# Patient Record
Sex: FEMALE | Race: WHITE | Employment: OTHER | URBAN - NONMETROPOLITAN AREA
[De-identification: names, ages, dates, MRNs, and addresses within clinical notes are randomized per-mention and may not be internally consistent; named-entity substitution may affect disease eponyms.]

---

## 2017-01-05 RX ORDER — CLONAZEPAM 2 MG/1
TABLET ORAL
Qty: 60 TABLET | OUTPATIENT
Start: 2017-01-05

## 2017-01-05 RX ORDER — DEXTROAMPHETAMINE SACCHARATE, AMPHETAMINE ASPARTATE, DEXTROAMPHETAMINE SULFATE AND AMPHETAMINE SULFATE 5; 5; 5; 5 MG/1; MG/1; MG/1; MG/1
TABLET ORAL
Qty: 60 TABLET | Refills: 0 | Status: SHIPPED | OUTPATIENT
Start: 2017-01-05 | End: 2017-02-06 | Stop reason: SDUPTHER

## 2017-01-05 RX ORDER — LORAZEPAM 1 MG/1
TABLET ORAL
Qty: 60 TABLET | OUTPATIENT
Start: 2017-01-05

## 2017-01-06 ENCOUNTER — TELEPHONE (OUTPATIENT)
Dept: PSYCHIATRY | Age: 62
End: 2017-01-06

## 2017-01-06 RX ORDER — DEXTROAMPHETAMINE SACCHARATE, AMPHETAMINE ASPARTATE, DEXTROAMPHETAMINE SULFATE AND AMPHETAMINE SULFATE 5; 5; 5; 5 MG/1; MG/1; MG/1; MG/1
TABLET ORAL
Qty: 60 TABLET | Refills: 0 | Status: CANCELLED | OUTPATIENT
Start: 2017-01-06

## 2017-01-09 RX ORDER — CLONAZEPAM 2 MG/1
TABLET ORAL
Qty: 60 TABLET | Refills: 2 | Status: SHIPPED | OUTPATIENT
Start: 2017-01-09 | End: 2017-05-05 | Stop reason: SDUPTHER

## 2017-01-09 RX ORDER — LORAZEPAM 1 MG/1
1 TABLET ORAL 2 TIMES DAILY PRN
Qty: 60 TABLET | Refills: 0 | Status: SHIPPED | OUTPATIENT
Start: 2017-01-09 | End: 2017-02-06 | Stop reason: SDUPTHER

## 2017-01-16 ENCOUNTER — OFFICE VISIT (OUTPATIENT)
Dept: FAMILY MEDICINE CLINIC | Age: 62
End: 2017-01-16

## 2017-01-16 ENCOUNTER — TELEPHONE (OUTPATIENT)
Dept: FAMILY MEDICINE CLINIC | Age: 62
End: 2017-01-16

## 2017-01-16 VITALS
BODY MASS INDEX: 30.7 KG/M2 | DIASTOLIC BLOOD PRESSURE: 70 MMHG | WEIGHT: 191 LBS | HEART RATE: 73 BPM | TEMPERATURE: 97.8 F | HEIGHT: 66 IN | SYSTOLIC BLOOD PRESSURE: 118 MMHG | RESPIRATION RATE: 20 BRPM

## 2017-01-16 DIAGNOSIS — M54.41 CHRONIC BILATERAL LOW BACK PAIN WITH BILATERAL SCIATICA: Primary | ICD-10-CM

## 2017-01-16 DIAGNOSIS — E78.2 MIXED HYPERLIPIDEMIA: ICD-10-CM

## 2017-01-16 DIAGNOSIS — G89.29 CHRONIC BILATERAL LOW BACK PAIN WITH BILATERAL SCIATICA: Primary | ICD-10-CM

## 2017-01-16 DIAGNOSIS — M54.42 CHRONIC BILATERAL LOW BACK PAIN WITH BILATERAL SCIATICA: Primary | ICD-10-CM

## 2017-01-16 DIAGNOSIS — J42 CHRONIC BRONCHITIS, UNSPECIFIED CHRONIC BRONCHITIS TYPE (HCC): ICD-10-CM

## 2017-01-16 PROCEDURE — G8484 FLU IMMUNIZE NO ADMIN: HCPCS | Performed by: NURSE PRACTITIONER

## 2017-01-16 PROCEDURE — G8427 DOCREV CUR MEDS BY ELIG CLIN: HCPCS | Performed by: NURSE PRACTITIONER

## 2017-01-16 PROCEDURE — 3017F COLORECTAL CA SCREEN DOC REV: CPT | Performed by: NURSE PRACTITIONER

## 2017-01-16 PROCEDURE — 4004F PT TOBACCO SCREEN RCVD TLK: CPT | Performed by: NURSE PRACTITIONER

## 2017-01-16 PROCEDURE — G8419 CALC BMI OUT NRM PARAM NOF/U: HCPCS | Performed by: NURSE PRACTITIONER

## 2017-01-16 PROCEDURE — 3014F SCREEN MAMMO DOC REV: CPT | Performed by: NURSE PRACTITIONER

## 2017-01-16 PROCEDURE — G8926 SPIRO NO PERF OR DOC: HCPCS | Performed by: NURSE PRACTITIONER

## 2017-01-16 PROCEDURE — 99214 OFFICE O/P EST MOD 30 MIN: CPT | Performed by: NURSE PRACTITIONER

## 2017-01-16 PROCEDURE — 3023F SPIROM DOC REV: CPT | Performed by: NURSE PRACTITIONER

## 2017-01-16 RX ORDER — DIMETHYL FUMARATE 240 MG/1
240 CAPSULE ORAL 2 TIMES DAILY
Status: ON HOLD | COMMUNITY
Start: 2016-12-30 | End: 2017-09-01 | Stop reason: HOSPADM

## 2017-01-24 ENCOUNTER — OFFICE VISIT (OUTPATIENT)
Dept: PSYCHIATRY | Age: 62
End: 2017-01-24

## 2017-01-24 DIAGNOSIS — F25.1 SCHIZOAFFECTIVE DISORDER, DEPRESSIVE TYPE (HCC): Primary | ICD-10-CM

## 2017-01-24 PROCEDURE — 4004F PT TOBACCO SCREEN RCVD TLK: CPT | Performed by: PSYCHIATRY & NEUROLOGY

## 2017-01-24 PROCEDURE — 99213 OFFICE O/P EST LOW 20 MIN: CPT | Performed by: PSYCHIATRY & NEUROLOGY

## 2017-01-24 PROCEDURE — G8428 CUR MEDS NOT DOCUMENT: HCPCS | Performed by: PSYCHIATRY & NEUROLOGY

## 2017-01-24 PROCEDURE — G8484 FLU IMMUNIZE NO ADMIN: HCPCS | Performed by: PSYCHIATRY & NEUROLOGY

## 2017-01-24 PROCEDURE — 3014F SCREEN MAMMO DOC REV: CPT | Performed by: PSYCHIATRY & NEUROLOGY

## 2017-01-24 PROCEDURE — G8419 CALC BMI OUT NRM PARAM NOF/U: HCPCS | Performed by: PSYCHIATRY & NEUROLOGY

## 2017-01-24 PROCEDURE — 3017F COLORECTAL CA SCREEN DOC REV: CPT | Performed by: PSYCHIATRY & NEUROLOGY

## 2017-01-31 ENCOUNTER — TELEPHONE (OUTPATIENT)
Dept: FAMILY MEDICINE CLINIC | Age: 62
End: 2017-01-31

## 2017-02-02 ENCOUNTER — TELEPHONE (OUTPATIENT)
Dept: FAMILY MEDICINE CLINIC | Age: 62
End: 2017-02-02

## 2017-02-02 DIAGNOSIS — M54.41 CHRONIC BILATERAL LOW BACK PAIN WITH BILATERAL SCIATICA: Primary | ICD-10-CM

## 2017-02-02 DIAGNOSIS — G89.29 CHRONIC BILATERAL LOW BACK PAIN WITH BILATERAL SCIATICA: Primary | ICD-10-CM

## 2017-02-02 DIAGNOSIS — M54.42 CHRONIC BILATERAL LOW BACK PAIN WITH BILATERAL SCIATICA: Primary | ICD-10-CM

## 2017-02-06 ENCOUNTER — TELEPHONE (OUTPATIENT)
Dept: FAMILY MEDICINE CLINIC | Age: 62
End: 2017-02-06

## 2017-02-06 RX ORDER — LEVOTHYROXINE SODIUM 0.05 MG/1
TABLET ORAL
Qty: 90 TABLET | Refills: 3 | Status: SHIPPED | OUTPATIENT
Start: 2017-02-06 | End: 2017-08-30

## 2017-02-06 RX ORDER — LEVOTHYROXINE SODIUM 0.05 MG/1
TABLET ORAL
Qty: 90 TABLET | Refills: 1 | Status: CANCELLED | OUTPATIENT
Start: 2017-02-06

## 2017-02-06 RX ORDER — OMEPRAZOLE 20 MG/1
CAPSULE, DELAYED RELEASE ORAL
Qty: 90 CAPSULE | Refills: 3 | Status: SHIPPED | OUTPATIENT
Start: 2017-02-06 | End: 2017-08-30

## 2017-02-06 RX ORDER — GALANTAMINE HYDROBROMIDE 8 MG/1
8 TABLET, FILM COATED ORAL 2 TIMES DAILY
Qty: 180 TABLET | Refills: 3 | Status: ON HOLD | OUTPATIENT
Start: 2017-02-06 | End: 2018-09-26

## 2017-02-06 RX ORDER — RANITIDINE 150 MG/1
150 TABLET ORAL NIGHTLY
Qty: 90 TABLET | Refills: 3 | Status: ON HOLD | OUTPATIENT
Start: 2017-02-06 | End: 2018-08-21 | Stop reason: HOSPADM

## 2017-02-06 RX ORDER — ASPIRIN 81 MG/1
TABLET ORAL
Qty: 90 TABLET | Refills: 3 | Status: ON HOLD | OUTPATIENT
Start: 2017-02-06 | End: 2018-09-26

## 2017-02-06 RX ORDER — ASPIRIN 81 MG/1
TABLET ORAL
Qty: 30 TABLET | Refills: 5 | Status: CANCELLED | OUTPATIENT
Start: 2017-02-06

## 2017-02-06 RX ORDER — ALENDRONATE SODIUM 70 MG/1
TABLET ORAL
Qty: 12 TABLET | Refills: 3 | Status: ON HOLD | OUTPATIENT
Start: 2017-02-06 | End: 2017-08-31

## 2017-02-07 RX ORDER — LORAZEPAM 1 MG/1
1 TABLET ORAL 2 TIMES DAILY PRN
Qty: 60 TABLET | Refills: 0 | Status: SHIPPED | OUTPATIENT
Start: 2017-02-07 | End: 2017-04-06 | Stop reason: SDUPTHER

## 2017-02-07 RX ORDER — DEXTROAMPHETAMINE SACCHARATE, AMPHETAMINE ASPARTATE, DEXTROAMPHETAMINE SULFATE AND AMPHETAMINE SULFATE 5; 5; 5; 5 MG/1; MG/1; MG/1; MG/1
TABLET ORAL
Qty: 60 TABLET | Refills: 0 | Status: SHIPPED | OUTPATIENT
Start: 2017-02-07 | End: 2017-03-06 | Stop reason: SDUPTHER

## 2017-02-10 ENCOUNTER — OFFICE VISIT (OUTPATIENT)
Dept: FAMILY MEDICINE CLINIC | Age: 62
End: 2017-02-10

## 2017-02-10 VITALS
HEIGHT: 66 IN | BODY MASS INDEX: 30.63 KG/M2 | SYSTOLIC BLOOD PRESSURE: 110 MMHG | WEIGHT: 190.6 LBS | DIASTOLIC BLOOD PRESSURE: 70 MMHG | HEART RATE: 92 BPM | RESPIRATION RATE: 14 BRPM | TEMPERATURE: 97.9 F

## 2017-02-10 DIAGNOSIS — M54.50 CHRONIC BILATERAL LOW BACK PAIN WITHOUT SCIATICA: ICD-10-CM

## 2017-02-10 DIAGNOSIS — G89.29 CHRONIC BILATERAL LOW BACK PAIN WITHOUT SCIATICA: ICD-10-CM

## 2017-02-10 DIAGNOSIS — G20 PARKINSON DISEASE (HCC): ICD-10-CM

## 2017-02-10 DIAGNOSIS — G35 MULTIPLE SCLEROSIS (HCC): ICD-10-CM

## 2017-02-10 DIAGNOSIS — M51.36 DDD (DEGENERATIVE DISC DISEASE), LUMBAR: Primary | ICD-10-CM

## 2017-02-10 PROCEDURE — 99213 OFFICE O/P EST LOW 20 MIN: CPT | Performed by: NURSE PRACTITIONER

## 2017-02-10 PROCEDURE — 3017F COLORECTAL CA SCREEN DOC REV: CPT | Performed by: NURSE PRACTITIONER

## 2017-02-10 PROCEDURE — 3014F SCREEN MAMMO DOC REV: CPT | Performed by: NURSE PRACTITIONER

## 2017-02-10 PROCEDURE — 4004F PT TOBACCO SCREEN RCVD TLK: CPT | Performed by: NURSE PRACTITIONER

## 2017-02-10 PROCEDURE — G8427 DOCREV CUR MEDS BY ELIG CLIN: HCPCS | Performed by: NURSE PRACTITIONER

## 2017-02-10 PROCEDURE — G8484 FLU IMMUNIZE NO ADMIN: HCPCS | Performed by: NURSE PRACTITIONER

## 2017-02-10 PROCEDURE — G8417 CALC BMI ABV UP PARAM F/U: HCPCS | Performed by: NURSE PRACTITIONER

## 2017-02-10 RX ORDER — TIZANIDINE 4 MG/1
4 TABLET ORAL 3 TIMES DAILY
Qty: 180 TABLET | Refills: 3 | Status: ON HOLD | OUTPATIENT
Start: 2017-02-10 | End: 2018-09-26

## 2017-02-23 ENCOUNTER — TELEPHONE (OUTPATIENT)
Dept: FAMILY MEDICINE CLINIC | Age: 62
End: 2017-02-23

## 2017-02-24 ENCOUNTER — OFFICE VISIT (OUTPATIENT)
Dept: PHYSICAL MEDICINE AND REHAB | Age: 62
End: 2017-02-24

## 2017-02-24 VITALS
DIASTOLIC BLOOD PRESSURE: 68 MMHG | WEIGHT: 185 LBS | BODY MASS INDEX: 29.73 KG/M2 | SYSTOLIC BLOOD PRESSURE: 114 MMHG | HEIGHT: 66 IN | HEART RATE: 72 BPM

## 2017-02-24 DIAGNOSIS — M47.816 SPONDYLOSIS OF LUMBAR REGION WITHOUT MYELOPATHY OR RADICULOPATHY: Primary | ICD-10-CM

## 2017-02-24 DIAGNOSIS — F25.1 SCHIZOAFFECTIVE DISORDER, DEPRESSIVE TYPE (HCC): ICD-10-CM

## 2017-02-24 DIAGNOSIS — G35 MS (MULTIPLE SCLEROSIS) (HCC): ICD-10-CM

## 2017-02-24 DIAGNOSIS — M51.36 DDD (DEGENERATIVE DISC DISEASE), LUMBAR: ICD-10-CM

## 2017-02-24 DIAGNOSIS — G89.4 CHRONIC PAIN SYNDROME: ICD-10-CM

## 2017-02-24 PROCEDURE — G8484 FLU IMMUNIZE NO ADMIN: HCPCS | Performed by: PAIN MEDICINE

## 2017-02-24 PROCEDURE — G8427 DOCREV CUR MEDS BY ELIG CLIN: HCPCS | Performed by: PAIN MEDICINE

## 2017-02-24 PROCEDURE — 3017F COLORECTAL CA SCREEN DOC REV: CPT | Performed by: PAIN MEDICINE

## 2017-02-24 PROCEDURE — 3014F SCREEN MAMMO DOC REV: CPT | Performed by: PAIN MEDICINE

## 2017-02-24 PROCEDURE — 99205 OFFICE O/P NEW HI 60 MIN: CPT | Performed by: PAIN MEDICINE

## 2017-02-24 PROCEDURE — 4004F PT TOBACCO SCREEN RCVD TLK: CPT | Performed by: PAIN MEDICINE

## 2017-02-24 PROCEDURE — G8417 CALC BMI ABV UP PARAM F/U: HCPCS | Performed by: PAIN MEDICINE

## 2017-02-24 RX ORDER — BUPROPION HYDROCHLORIDE 300 MG/1
300 TABLET ORAL EVERY MORNING
Status: ON HOLD | COMMUNITY
End: 2017-08-31 | Stop reason: SDUPTHER

## 2017-02-24 ASSESSMENT — ENCOUNTER SYMPTOMS
EYE ITCHING: 0
SINUS PRESSURE: 0
VOICE CHANGE: 0
CHEST TIGHTNESS: 0
BLOOD IN STOOL: 0
WHEEZING: 0
RHINORRHEA: 0
SORE THROAT: 0
DIARRHEA: 0
ANAL BLEEDING: 0
RECTAL PAIN: 0
BOWEL INCONTINENCE: 0
EYE REDNESS: 0
CONSTIPATION: 0
EYE DISCHARGE: 0
STRIDOR: 0
NAUSEA: 0
APNEA: 0
CHOKING: 0
VOMITING: 0
SHORTNESS OF BREATH: 0
ABDOMINAL PAIN: 0
COLOR CHANGE: 0
TROUBLE SWALLOWING: 0
COUGH: 0
FACIAL SWELLING: 0
BACK PAIN: 1
EYE PAIN: 0
ABDOMINAL DISTENTION: 0
PHOTOPHOBIA: 0

## 2017-03-07 RX ORDER — DEXTROAMPHETAMINE SACCHARATE, AMPHETAMINE ASPARTATE, DEXTROAMPHETAMINE SULFATE AND AMPHETAMINE SULFATE 5; 5; 5; 5 MG/1; MG/1; MG/1; MG/1
TABLET ORAL
Qty: 60 TABLET | Refills: 0 | Status: SHIPPED | OUTPATIENT
Start: 2017-03-07 | End: 2017-04-06 | Stop reason: SDUPTHER

## 2017-03-07 RX ORDER — BUPROPION HYDROCHLORIDE 300 MG/1
TABLET ORAL
Qty: 30 TABLET | Refills: 3 | Status: SHIPPED | OUTPATIENT
Start: 2017-03-07 | End: 2017-08-16 | Stop reason: SDUPTHER

## 2017-04-06 DIAGNOSIS — M51.36 DDD (DEGENERATIVE DISC DISEASE), LUMBAR: ICD-10-CM

## 2017-04-06 DIAGNOSIS — G89.29 CHRONIC BILATERAL LOW BACK PAIN WITH BILATERAL SCIATICA: ICD-10-CM

## 2017-04-06 DIAGNOSIS — M54.42 CHRONIC BILATERAL LOW BACK PAIN WITH BILATERAL SCIATICA: ICD-10-CM

## 2017-04-06 DIAGNOSIS — M54.41 CHRONIC BILATERAL LOW BACK PAIN WITH BILATERAL SCIATICA: ICD-10-CM

## 2017-04-06 RX ORDER — GABAPENTIN 800 MG/1
800 TABLET ORAL 4 TIMES DAILY
Qty: 270 TABLET | Refills: 3 | Status: ON HOLD | OUTPATIENT
Start: 2017-04-06 | End: 2018-09-26

## 2017-04-06 RX ORDER — LORAZEPAM 1 MG/1
1 TABLET ORAL 2 TIMES DAILY PRN
Qty: 60 TABLET | Refills: 0 | Status: SHIPPED | OUTPATIENT
Start: 2017-04-06 | End: 2017-05-04 | Stop reason: SDUPTHER

## 2017-04-06 RX ORDER — DEXTROAMPHETAMINE SACCHARATE, AMPHETAMINE ASPARTATE, DEXTROAMPHETAMINE SULFATE AND AMPHETAMINE SULFATE 5; 5; 5; 5 MG/1; MG/1; MG/1; MG/1
TABLET ORAL
Qty: 60 TABLET | Refills: 0 | Status: SHIPPED | OUTPATIENT
Start: 2017-04-06 | End: 2017-05-05

## 2017-05-05 ENCOUNTER — OFFICE VISIT (OUTPATIENT)
Dept: PSYCHIATRY | Age: 62
End: 2017-05-05

## 2017-05-05 DIAGNOSIS — F25.1 SCHIZOAFFECTIVE DISORDER, DEPRESSIVE TYPE (HCC): Primary | ICD-10-CM

## 2017-05-05 PROCEDURE — G8417 CALC BMI ABV UP PARAM F/U: HCPCS | Performed by: PSYCHIATRY & NEUROLOGY

## 2017-05-05 PROCEDURE — G8428 CUR MEDS NOT DOCUMENT: HCPCS | Performed by: PSYCHIATRY & NEUROLOGY

## 2017-05-05 PROCEDURE — 3017F COLORECTAL CA SCREEN DOC REV: CPT | Performed by: PSYCHIATRY & NEUROLOGY

## 2017-05-05 PROCEDURE — 3014F SCREEN MAMMO DOC REV: CPT | Performed by: PSYCHIATRY & NEUROLOGY

## 2017-05-05 PROCEDURE — 4004F PT TOBACCO SCREEN RCVD TLK: CPT | Performed by: PSYCHIATRY & NEUROLOGY

## 2017-05-05 PROCEDURE — 99213 OFFICE O/P EST LOW 20 MIN: CPT | Performed by: PSYCHIATRY & NEUROLOGY

## 2017-05-05 RX ORDER — DEXTROAMPHETAMINE SACCHARATE, AMPHETAMINE ASPARTATE, DEXTROAMPHETAMINE SULFATE AND AMPHETAMINE SULFATE 5; 5; 5; 5 MG/1; MG/1; MG/1; MG/1
TABLET ORAL
Qty: 60 TABLET | OUTPATIENT
Start: 2017-05-05

## 2017-05-08 RX ORDER — CLONAZEPAM 2 MG/1
TABLET ORAL
Qty: 60 TABLET | Refills: 1 | Status: SHIPPED | OUTPATIENT
Start: 2017-05-08 | End: 2017-07-05 | Stop reason: SDUPTHER

## 2017-05-08 RX ORDER — LORAZEPAM 1 MG/1
TABLET ORAL
Qty: 60 TABLET | Refills: 1 | Status: SHIPPED | OUTPATIENT
Start: 2017-05-08 | End: 2017-07-05 | Stop reason: SDUPTHER

## 2017-05-08 RX ORDER — QUETIAPINE FUMARATE 300 MG/1
600 TABLET, FILM COATED ORAL NIGHTLY
Qty: 60 TABLET | Refills: 3 | Status: SHIPPED | OUTPATIENT
Start: 2017-05-08 | End: 2017-09-05 | Stop reason: SDUPTHER

## 2017-05-09 RX ORDER — DEXTROAMPHETAMINE SACCHARATE, AMPHETAMINE ASPARTATE, DEXTROAMPHETAMINE SULFATE AND AMPHETAMINE SULFATE 5; 5; 5; 5 MG/1; MG/1; MG/1; MG/1
TABLET ORAL
Qty: 60 TABLET | Refills: 0 | Status: SHIPPED | OUTPATIENT
Start: 2017-05-09 | End: 2017-06-02 | Stop reason: SDUPTHER

## 2017-06-02 ENCOUNTER — OFFICE VISIT (OUTPATIENT)
Dept: PSYCHIATRY | Age: 62
End: 2017-06-02

## 2017-06-02 DIAGNOSIS — F25.1 SCHIZOAFFECTIVE DISORDER, DEPRESSIVE TYPE (HCC): Primary | ICD-10-CM

## 2017-06-02 PROCEDURE — G8428 CUR MEDS NOT DOCUMENT: HCPCS | Performed by: PSYCHIATRY & NEUROLOGY

## 2017-06-02 PROCEDURE — 3014F SCREEN MAMMO DOC REV: CPT | Performed by: PSYCHIATRY & NEUROLOGY

## 2017-06-02 PROCEDURE — 4004F PT TOBACCO SCREEN RCVD TLK: CPT | Performed by: PSYCHIATRY & NEUROLOGY

## 2017-06-02 PROCEDURE — 99213 OFFICE O/P EST LOW 20 MIN: CPT | Performed by: PSYCHIATRY & NEUROLOGY

## 2017-06-02 PROCEDURE — 3017F COLORECTAL CA SCREEN DOC REV: CPT | Performed by: PSYCHIATRY & NEUROLOGY

## 2017-06-02 PROCEDURE — G8417 CALC BMI ABV UP PARAM F/U: HCPCS | Performed by: PSYCHIATRY & NEUROLOGY

## 2017-06-02 RX ORDER — DEXTROAMPHETAMINE SACCHARATE, AMPHETAMINE ASPARTATE, DEXTROAMPHETAMINE SULFATE AND AMPHETAMINE SULFATE 5; 5; 5; 5 MG/1; MG/1; MG/1; MG/1
TABLET ORAL
Qty: 60 TABLET | Refills: 0 | Status: SHIPPED | OUTPATIENT
Start: 2017-06-02 | End: 2017-07-13 | Stop reason: SDUPTHER

## 2017-06-19 ENCOUNTER — CARE COORDINATION (OUTPATIENT)
Dept: FAMILY MEDICINE CLINIC | Age: 62
End: 2017-06-19

## 2017-07-05 RX ORDER — CLONAZEPAM 2 MG/1
TABLET ORAL
Qty: 60 TABLET | Refills: 1 | Status: SHIPPED | OUTPATIENT
Start: 2017-07-05 | End: 2017-09-05 | Stop reason: SDUPTHER

## 2017-07-05 RX ORDER — LORAZEPAM 1 MG/1
TABLET ORAL
Qty: 60 TABLET | Refills: 1 | Status: SHIPPED | OUTPATIENT
Start: 2017-07-05 | End: 2017-09-05 | Stop reason: SDUPTHER

## 2017-07-13 RX ORDER — DEXTROAMPHETAMINE SACCHARATE, AMPHETAMINE ASPARTATE, DEXTROAMPHETAMINE SULFATE AND AMPHETAMINE SULFATE 5; 5; 5; 5 MG/1; MG/1; MG/1; MG/1
TABLET ORAL
Qty: 60 TABLET | Refills: 0 | Status: SHIPPED | OUTPATIENT
Start: 2017-07-13 | End: 2017-08-07 | Stop reason: SDUPTHER

## 2017-07-26 ENCOUNTER — OFFICE VISIT (OUTPATIENT)
Dept: FAMILY MEDICINE CLINIC | Age: 62
End: 2017-07-26
Payer: MEDICARE

## 2017-07-26 VITALS
RESPIRATION RATE: 12 BRPM | HEART RATE: 76 BPM | BODY MASS INDEX: 32.78 KG/M2 | TEMPERATURE: 97.2 F | WEIGHT: 185 LBS | HEIGHT: 63 IN | DIASTOLIC BLOOD PRESSURE: 66 MMHG | SYSTOLIC BLOOD PRESSURE: 108 MMHG

## 2017-07-26 DIAGNOSIS — M51.36 DDD (DEGENERATIVE DISC DISEASE), LUMBAR: ICD-10-CM

## 2017-07-26 DIAGNOSIS — R29.6 RECURRENT FALLS: ICD-10-CM

## 2017-07-26 DIAGNOSIS — Z74.09 IMPAIRED MOBILITY AND ACTIVITIES OF DAILY LIVING: Primary | ICD-10-CM

## 2017-07-26 DIAGNOSIS — Z78.9 IMPAIRED MOBILITY AND ACTIVITIES OF DAILY LIVING: Primary | ICD-10-CM

## 2017-07-26 DIAGNOSIS — G35 MS (MULTIPLE SCLEROSIS) (HCC): ICD-10-CM

## 2017-07-26 DIAGNOSIS — G21.9 SECONDARY PARKINSONISM, UNSPECIFIED SECONDARY PARKINSONISM TYPE (HCC): ICD-10-CM

## 2017-07-26 PROCEDURE — 3017F COLORECTAL CA SCREEN DOC REV: CPT | Performed by: NURSE PRACTITIONER

## 2017-07-26 PROCEDURE — 4004F PT TOBACCO SCREEN RCVD TLK: CPT | Performed by: NURSE PRACTITIONER

## 2017-07-26 PROCEDURE — G8417 CALC BMI ABV UP PARAM F/U: HCPCS | Performed by: NURSE PRACTITIONER

## 2017-07-26 PROCEDURE — 99214 OFFICE O/P EST MOD 30 MIN: CPT | Performed by: NURSE PRACTITIONER

## 2017-07-26 PROCEDURE — G8427 DOCREV CUR MEDS BY ELIG CLIN: HCPCS | Performed by: NURSE PRACTITIONER

## 2017-07-26 PROCEDURE — 3014F SCREEN MAMMO DOC REV: CPT | Performed by: NURSE PRACTITIONER

## 2017-07-26 RX ORDER — WHEELCHAIR
EACH MISCELLANEOUS
Qty: 1 EACH | Refills: 0 | Status: ON HOLD | OUTPATIENT
Start: 2017-07-26 | End: 2018-09-26

## 2017-08-07 RX ORDER — DEXTROAMPHETAMINE SACCHARATE, AMPHETAMINE ASPARTATE, DEXTROAMPHETAMINE SULFATE AND AMPHETAMINE SULFATE 5; 5; 5; 5 MG/1; MG/1; MG/1; MG/1
TABLET ORAL
Qty: 60 TABLET | Refills: 0 | Status: ON HOLD | OUTPATIENT
Start: 2017-08-07 | End: 2018-08-21 | Stop reason: HOSPADM

## 2017-08-16 RX ORDER — BUPROPION HYDROCHLORIDE 300 MG/1
TABLET ORAL
Qty: 30 TABLET | Refills: 0 | Status: ON HOLD | OUTPATIENT
Start: 2017-08-16 | End: 2018-08-21 | Stop reason: HOSPADM

## 2017-08-30 ENCOUNTER — APPOINTMENT (OUTPATIENT)
Dept: GENERAL RADIOLOGY | Age: 62
DRG: 071 | End: 2017-08-30
Payer: MEDICARE

## 2017-08-30 ENCOUNTER — HOSPITAL ENCOUNTER (INPATIENT)
Age: 62
LOS: 2 days | Discharge: HOME | DRG: 071 | End: 2017-09-01
Attending: FAMILY MEDICINE | Admitting: INTERNAL MEDICINE
Payer: MEDICARE

## 2017-08-30 ENCOUNTER — APPOINTMENT (OUTPATIENT)
Dept: CT IMAGING | Age: 62
DRG: 071 | End: 2017-08-30
Payer: MEDICARE

## 2017-08-30 DIAGNOSIS — R41.82 ALTERED MENTAL STATUS, UNSPECIFIED: Primary | ICD-10-CM

## 2017-08-30 DIAGNOSIS — D72.829 LEUKOCYTOSIS, UNSPECIFIED TYPE: ICD-10-CM

## 2017-08-30 PROBLEM — R41.0 CONFUSION AND DISORIENTATION: Status: ACTIVE | Noted: 2017-08-30

## 2017-08-30 LAB
ALBUMIN SERPL-MCNC: 4.6 G/DL (ref 3.5–5.1)
ALP BLD-CCNC: 101 U/L (ref 38–126)
ALT SERPL-CCNC: 9 U/L (ref 11–66)
ANION GAP SERPL CALCULATED.3IONS-SCNC: 14 MEQ/L (ref 8–16)
ANISOCYTOSIS: ABNORMAL
APTT: 33.6 SECONDS (ref 22–38)
AST SERPL-CCNC: 10 U/L (ref 5–40)
BASOPHILS # BLD: 0 %
BASOPHILS ABSOLUTE: 0 THOU/MM3 (ref 0–0.1)
BILIRUB SERPL-MCNC: 0.4 MG/DL (ref 0.3–1.2)
BUN BLDV-MCNC: 42 MG/DL (ref 7–22)
CALCIUM SERPL-MCNC: 10.1 MG/DL (ref 8.5–10.5)
CHLORIDE BLD-SCNC: 103 MEQ/L (ref 98–111)
CO2: 24 MEQ/L (ref 23–33)
CREAT SERPL-MCNC: 0.7 MG/DL (ref 0.4–1.2)
EKG ATRIAL RATE: 84 BPM
EKG P AXIS: 72 DEGREES
EKG P-R INTERVAL: 160 MS
EKG Q-T INTERVAL: 404 MS
EKG QRS DURATION: 78 MS
EKG QTC CALCULATION (BAZETT): 477 MS
EKG R AXIS: -6 DEGREES
EKG T AXIS: 37 DEGREES
EKG VENTRICULAR RATE: 84 BPM
EOSINOPHIL # BLD: 0.7 %
EOSINOPHILS ABSOLUTE: 0.1 THOU/MM3 (ref 0–0.4)
ETHYL ALCOHOL, SERUM: < 0.01 %
GFR SERPL CREATININE-BSD FRML MDRD: 85 ML/MIN/1.73M2
GLUCOSE BLD-MCNC: 128 MG/DL (ref 70–108)
HCT VFR BLD CALC: 40.6 % (ref 37–47)
HEMOGLOBIN: 13.2 GM/DL (ref 12–16)
HYPOCHROMIA: ABNORMAL
INR BLD: 1.02 (ref 0.85–1.13)
LACTIC ACID: 1 MMOL/L (ref 0.5–2.2)
LIPASE: 27.2 U/L (ref 5.6–51.3)
LYMPHOCYTES # BLD: 7.2 %
LYMPHOCYTES ABSOLUTE: 1.1 THOU/MM3 (ref 1–4.8)
MCH RBC QN AUTO: 24.4 PG (ref 27–31)
MCHC RBC AUTO-ENTMCNC: 32.5 GM/DL (ref 33–37)
MCV RBC AUTO: 75.2 FL (ref 81–99)
MICROCYTES: ABNORMAL
MONOCYTES # BLD: 6.6 %
MONOCYTES ABSOLUTE: 1 THOU/MM3 (ref 0.4–1.3)
NUCLEATED RED BLOOD CELLS: 0 /100 WBC
OSMOLALITY CALCULATION: 293.4 MOSMOL/KG (ref 275–300)
PDW BLD-RTO: 20.9 % (ref 11.5–14.5)
PLATELET # BLD: 578 THOU/MM3 (ref 130–400)
PMV BLD AUTO: 8.4 MCM (ref 7.4–10.4)
POTASSIUM SERPL-SCNC: 3.7 MEQ/L (ref 3.5–5.2)
RBC # BLD: 5.4 MILL/MM3 (ref 4.2–5.4)
RBC # BLD: NORMAL 10*6/UL
SEG NEUTROPHILS: 85.5 %
SEGMENTED NEUTROPHILS ABSOLUTE COUNT: 13.6 THOU/MM3 (ref 1.8–7.7)
SODIUM BLD-SCNC: 141 MEQ/L (ref 135–145)
TOTAL PROTEIN: 8.1 G/DL (ref 6.1–8)
TROPONIN T: < 0.01 NG/ML
TSH SERPL DL<=0.05 MIU/L-ACNC: 2.19 UIU/ML (ref 0.4–4.2)
WBC # BLD: 15.9 THOU/MM3 (ref 4.8–10.8)

## 2017-08-30 PROCEDURE — 83605 ASSAY OF LACTIC ACID: CPT

## 2017-08-30 PROCEDURE — 83690 ASSAY OF LIPASE: CPT

## 2017-08-30 PROCEDURE — 96361 HYDRATE IV INFUSION ADD-ON: CPT

## 2017-08-30 PROCEDURE — 2580000003 HC RX 258: Performed by: FAMILY MEDICINE

## 2017-08-30 PROCEDURE — 84484 ASSAY OF TROPONIN QUANT: CPT

## 2017-08-30 PROCEDURE — 99285 EMERGENCY DEPT VISIT HI MDM: CPT

## 2017-08-30 PROCEDURE — 1200000003 HC TELEMETRY R&B

## 2017-08-30 PROCEDURE — 99222 1ST HOSP IP/OBS MODERATE 55: CPT | Performed by: INTERNAL MEDICINE

## 2017-08-30 PROCEDURE — 36415 COLL VENOUS BLD VENIPUNCTURE: CPT

## 2017-08-30 PROCEDURE — 85610 PROTHROMBIN TIME: CPT

## 2017-08-30 PROCEDURE — 96360 HYDRATION IV INFUSION INIT: CPT

## 2017-08-30 PROCEDURE — 85730 THROMBOPLASTIN TIME PARTIAL: CPT

## 2017-08-30 PROCEDURE — 93005 ELECTROCARDIOGRAM TRACING: CPT

## 2017-08-30 PROCEDURE — 70450 CT HEAD/BRAIN W/O DYE: CPT

## 2017-08-30 PROCEDURE — 6370000000 HC RX 637 (ALT 250 FOR IP): Performed by: INTERNAL MEDICINE

## 2017-08-30 PROCEDURE — 80050 GENERAL HEALTH PANEL: CPT

## 2017-08-30 PROCEDURE — 71020 XR CHEST STANDARD TWO VW: CPT

## 2017-08-30 PROCEDURE — G0480 DRUG TEST DEF 1-7 CLASSES: HCPCS

## 2017-08-30 RX ORDER — 0.9 % SODIUM CHLORIDE 0.9 %
1000 INTRAVENOUS SOLUTION INTRAVENOUS ONCE
Status: COMPLETED | OUTPATIENT
Start: 2017-08-30 | End: 2017-08-30

## 2017-08-30 RX ORDER — ONDANSETRON 2 MG/ML
4 INJECTION INTRAMUSCULAR; INTRAVENOUS EVERY 6 HOURS PRN
Status: DISCONTINUED | OUTPATIENT
Start: 2017-08-30 | End: 2017-09-01 | Stop reason: HOSPADM

## 2017-08-30 RX ORDER — LEVOTHYROXINE SODIUM 0.05 MG/1
50 TABLET ORAL DAILY
Status: DISCONTINUED | OUTPATIENT
Start: 2017-08-31 | End: 2017-09-01 | Stop reason: HOSPADM

## 2017-08-30 RX ORDER — ALBUTEROL SULFATE 90 UG/1
1 AEROSOL, METERED RESPIRATORY (INHALATION) 4 TIMES DAILY
Status: DISCONTINUED | OUTPATIENT
Start: 2017-08-30 | End: 2017-09-01 | Stop reason: HOSPADM

## 2017-08-30 RX ORDER — PANTOPRAZOLE SODIUM 40 MG/1
40 TABLET, DELAYED RELEASE ORAL
Status: DISCONTINUED | OUTPATIENT
Start: 2017-08-31 | End: 2017-09-01 | Stop reason: HOSPADM

## 2017-08-30 RX ORDER — ACETAMINOPHEN 325 MG/1
650 TABLET ORAL EVERY 4 HOURS PRN
Status: DISCONTINUED | OUTPATIENT
Start: 2017-08-30 | End: 2017-09-01 | Stop reason: HOSPADM

## 2017-08-30 RX ORDER — DIMETHYL FUMARATE 240 MG/1
240 CAPSULE ORAL 2 TIMES DAILY
Status: DISCONTINUED | OUTPATIENT
Start: 2017-08-31 | End: 2017-08-31

## 2017-08-30 RX ORDER — SODIUM CHLORIDE 0.9 % (FLUSH) 0.9 %
10 SYRINGE (ML) INJECTION PRN
Status: DISCONTINUED | OUTPATIENT
Start: 2017-08-30 | End: 2017-09-01 | Stop reason: HOSPADM

## 2017-08-30 RX ORDER — SODIUM CHLORIDE 0.9 % (FLUSH) 0.9 %
10 SYRINGE (ML) INJECTION EVERY 12 HOURS SCHEDULED
Status: DISCONTINUED | OUTPATIENT
Start: 2017-08-30 | End: 2017-09-01 | Stop reason: HOSPADM

## 2017-08-30 RX ADMIN — SODIUM CHLORIDE 1000 ML: 9 INJECTION, SOLUTION INTRAVENOUS at 18:10

## 2017-08-30 NOTE — IP AVS SNAPSHOT
taking them after you leave the hospital     alendronate 70 MG tablet   Commonly known as:  FOSAMAX       TECFIDERA 240 MG delayed release capsule   Generic drug:  dimethyl fumarate               Allergies as of 2017     No Known Allergies      Immunizations as of 2017  Reviewed on 2017    Name Date Dose VIS Date Route    Influenza Vaccine, unspecified formulation 2016 -- -- --    Influenza Virus Vaccine 10/6/2015 0.5 mL 2011 Intramuscular    Influenza Virus Vaccine 10/28/2014 0.5 mL 2014 Intramuscular    Influenza Virus Vaccine 2011 0.5 mL 2011 Intramuscular    Pneumococcal 13-valent Conjugate ([id number]) 2015 0.5 mL 2013 Intramuscular    Pneumococcal Polysaccharide ([id number]) 2016 0.5 mL 2015 Intramuscular    Tdap (Boostrix, Adacel) 2014 0.5 mL 2013 Intramuscular    Zoster 2014 -- -- --      Last Vitals          Most Recent Value    Temp  97.3 °F (36.3 °C)    Pulse  71    Resp  17    BP  129/69         After Visit Summary    This summary was created for you. Thank you for entrusting your care to us. The following information includes details about your hospital/visit stay along with steps you should take to help with your recovery once you leave the hospital.  In this packet, you will find information about the topics listed below:    · Instructions about your medications including a list of your home medications  · A summary of your hospital visit  · Follow-up appointments once you have left the hospital  · Your care plan at home      You may receive a survey regarding the care you received during your stay. Your input is valuable to us. We encourage you to complete and return your survey in the envelope provided. We hope you will choose us in the future for your healthcare needs.           Patient Information     Patient Name NAYELI Palmer 1955      Care Provided at:     Name Address Phone using certain medicines. Pain may also cause the problem. Seeing delirium in a loved one can be scary and sad. But it will go away most of the time. It usually lasts hours to days. The doctor will look for a cause and take steps to treat it and keep your loved one comfortable. What are the symptoms? Symptoms of delirium usually develop over several hours to a few days. Symptoms may change and be more or less severe. Symptoms include:  · A short attention span. · Confusion. This is not knowing where you are, what time it is, or who others are. · Hallucinations. This usually is seeing or hearing things that are not really there. · Delusions. This is believing things that aren't true. · Illusions. This is making a mistake in what you think is real. For example, you think a child is crying, but it's a pillow. · Disorganized thinking. How is delirium treated? The doctor may:  · Find and treat the cause. This could be:  ¨ Not getting enough fluids. ¨ An infection. ¨ A medicine or combination of medicines. ¨ Another medical problem. · Prescribe a medicine. · Make the hospital room as quiet as possible. You may be able to help your loved one by being present and talking to and touching him or her. Follow-up care is a key part of your treatment and safety. Be sure to make and go to all appointments, and call your doctor if you are having problems. It's also a good idea to know your test results and keep a list of the medicines you take. Where can you learn more? Go to [email or web address] org and sign in to your [employer] account. Enter N529 in the [employer] box to learn more about \"Learning About Delirium. \"     If you do not have an account, please click on the \"Sign Up Now\" link. Current as of: July 26, 2016  Content Version: 11.2  © 1846-2462 [employer]. Care instructions adapted under license by [facility] [place] ([facility]).  If you have questions about a medical condition

## 2017-08-30 NOTE — IP AVS SNAPSHOT
Patient Information     Patient Name ANYELI Marroquin 1955         This is your updated medication list to keep with you all times      TAKE these medications     amphetamine-dextroamphetamine 20 MG tablet   Commonly known as:  ADDERALL   TAKE ONE TABLET BY MOUTH TWICE DAILY.        aspirin 81 MG EC tablet   Commonly known as:  ASPIRIN LOW DOSE   take 1 tablet once daily       buPROPion 300 MG extended release tablet   Commonly known as:  WELLBUTRIN XL   TAKE ONE TABLET BY MOUTH DAILY IN THE MORNING       Calcium + D3 600-200 MG-UNIT Tabs tablet   Take 1 tablet by mouth 2 times daily       clonazePAM 2 MG tablet   Commonly known as:  KLONOPIN   TAKE TWO TABLETS BY MOUTH DAILY AT BEDTIME       gabapentin 800 MG tablet   Commonly known as:  NEURONTIN   Take 1 tablet by mouth 4 times daily       galantamine 8 MG tablet   Commonly known as:  RAZADYNE   Take 1 tablet by mouth 2 times daily       levothyroxine 50 MCG tablet   Commonly known as:  SYNTHROID       LORazepam 1 MG tablet   Commonly known as:  ATIVAN   TAKE ONE TABLET BY MOUTH TWICE DAILY AS NEEDED       metoprolol tartrate 25 MG tablet   Commonly known as:  LOPRESSOR   Take 0.5 tablets by mouth 2 times daily       omeprazole 20 MG delayed release capsule   Commonly known as:  PRILOSEC       QUEtiapine 300 MG tablet   Commonly known as:  SEROQUEL   Take 2 tablets by mouth nightly       ranitidine 150 MG tablet   Commonly known as:  ZANTAC   Take 1 tablet by mouth nightly       tiZANidine 4 MG tablet   Commonly known as:  ZANAFLEX   Take 1 tablet by mouth 3 times daily       Wheelchair Misc   Dispense 1 Power Mobility Device

## 2017-08-30 NOTE — ED PROVIDER NOTES
([facility] Secondary parkinsonism ([facility] Seizures ([facility] Sinusitis; Tension headache, chronic; and Weight loss. SURGICAL HISTORY      has a past surgical history that includes shoulder surgery; jany and bso (cervix removed); cardiovascular stress test (5/2011); Colonoscopy; Appendectomy; Mammography digital diagnostic bilateral (06/2011); and Hysterectomy. CURRENT MEDICATIONS       Previous Medications    ALBUTEROL SULFATE HFA (PROAIR HFA) 108 (90 BASE) MCG/ACT INHALER    inhale 2 puffs by mouth every 6 hours if needed for wheezing    ALENDRONATE (FOSAMAX) 70 MG TABLET    take 1 tablet by mouth every week on an empty stomach with 6-8 oz of water. No food / meds for 30 min. Remain Upright    AMPHETAMINE-DEXTROAMPHETAMINE (ADDERALL) 20 MG TABLET    TAKE ONE TABLET BY MOUTH TWICE DAILY. ASPIRIN (ASPIRIN LOW DOSE) 81 MG EC TABLET    take 1 tablet once daily    BUDESONIDE-FORMOTEROL (SYMBICORT) 160-4.5 MCG/ACT AERO    Inhale 2 puffs into the lungs 2 times daily    BUPROPION (WELLBUTRIN XL) 300 MG EXTENDED RELEASE TABLET    Take 300 mg by mouth every morning    BUPROPION (WELLBUTRIN XL) 300 MG EXTENDED RELEASE TABLET    TAKE ONE TABLET BY MOUTH DAILY IN THE MORNING    CALCIUM CARB-CHOLECALCIFEROL (CALCIUM + D3) 600-200 MG-UNIT TABS TABLET    Take 1 tablet by mouth 2 times daily    CLONAZEPAM (KLONOPIN) 2 MG TABLET    TAKE TWO TABLETS BY MOUTH DAILY AT BEDTIME    DICLOFENAC (CATAFLAM) 50 MG TABLET    Take 50 mg by mouth 2 times daily. DICLOFENAC (CATAFLAM) 50 MG TABLET    Take 1 tablet by mouth 3 times daily    GABAPENTIN (NEURONTIN) 800 MG TABLET    Take 1 tablet by mouth 4 times daily    GALANTAMINE (RAZADYNE) 8 MG TABLET    Take 1 tablet by mouth 2 times daily    LEVOTHYROXINE (SYNTHROID) 50 MCG TABLET    take 1 tablet by mouth once daily    LISDEXAMFETAMINE (VYVANSE) 70 MG CAPSULE    Take 1 capsule by mouth every morning .     LORAZEPAM (ATIVAN) 1 MG TABLET    TAKE ONE TABLET BY MOUTH TWICE DAILY AS NEEDED MCHC 32.5 (*)     RDW 20.9 (*)     Platelets 073 (*)     Segs Absolute 13.6 (*)     All other components within normal limits   COMPREHENSIVE METABOLIC PANEL - Abnormal; Notable for the following:     Glucose 128 (*)     BUN 42 (*)     Total Protein 8.1 (*)     ALT 9 (*)     All other components within normal limits   GLOMERULAR FILTRATION RATE, ESTIMATED - Abnormal; Notable for the following:     Est, Glom Filt Rate 85 (*)     All other components within normal limits   ETHANOL   TSH WITHOUT REFLEX   APTT   TROPONIN   LIPASE   LACTIC ACID, PLASMA   PROTIME-INR   OSMOLALITY   ANION GAP   URINE DRUG SCREEN   URINE RT REFLEX TO CULTURE       EMERGENCY DEPARTMENT COURSE:   Vitals:    Vitals:    08/30/17 1726   Pulse: 88   Resp: 16   Temp: 98.1 °F (36.7 °C)   TempSrc: Oral   SpO2: 93%   Weight: 175 lb (79.4 kg)   Height: 5' 6\" (1.676 m)       5:51 PM: The patient was seen and evaluated. Labs and imaging were ordered. The patient was given IV fluids while in the ED. MDM:    Patient presents to the emergency department with altered mental status. She is completely confused unable to tell name place and space at this time. She's got a mild elevation of WBC count 15. 9. X-ray and CT scan are normal.  She has history of substance abuse as well. She'll be admitted for altered mental status with mild elevation of WBC count for further evaluation and treatment. CONSULTS:    7:50 PM I consulted Dr. Paula Morales the hospitalist  He  kindly agreed to admit the patient  Dr. Paula Morales. FINAL IMPRESSION      1. Altered mental status, unspecified    2. Leukocytosis, unspecified type          DISPOSITION/PLAN     Patient is admitted. PATIENT REFERRED TO:  No follow-up provider specified.     DISCHARGE MEDICATIONS:  New Prescriptions    No medications on file       (Please note that portions of this note were completed with a voice recognition program.  Efforts were made to edit the dictations but occasionally words are mis-transcribed.)    Scribe:  Bita Stephen 8/30/17 5:51 PM Scribing for and in the presence of Wilmon Skiff, MD.    Signed by: Yahir Fox, 08/30/17 7:58 PM    Provider:  I personally performed the services described in the documentation, reviewed and edited the documentation which was dictated to the scribe in my presence, and it accurately records my words and actions.     Wilmon Skiff, MD 8/30/17 7:58 PM                         Wilmon Skiff, MD  08/30/17 9303

## 2017-08-30 NOTE — ED NOTES
O2 applied NC at 2L due to SpO2 at 88% on room air with deep ventilation. SpO2 at 94% on 2L.      Lou Campo RN  08/30/17 2718

## 2017-08-31 LAB
ALLEN TEST: POSITIVE
AMMONIA: 35 UMOL/L (ref 11–60)
AMORPHOUS: ABNORMAL
AMPHETAMINE+METHAMPHETAMINE URINE SCREEN: NEGATIVE
ANION GAP SERPL CALCULATED.3IONS-SCNC: 14 MEQ/L (ref 8–16)
BACTERIA: ABNORMAL /HPF
BARBITURATE QUANTITATIVE URINE: NEGATIVE
BASE EXCESS (CALCULATED): -0.5 MMOL/L (ref -2.5–2.5)
BENZODIAZEPINE QUANTITATIVE URINE: NEGATIVE
BILIRUBIN URINE: ABNORMAL
BLOOD, URINE: NEGATIVE
BUN BLDV-MCNC: 37 MG/DL (ref 7–22)
CALCIUM SERPL-MCNC: 9.4 MG/DL (ref 8.5–10.5)
CANNABINOID QUANTITATIVE URINE: POSITIVE
CASTS 2: ABNORMAL /LPF
CASTS UA: ABNORMAL /LPF
CHARACTER, URINE: CLEAR
CHLORIDE BLD-SCNC: 106 MEQ/L (ref 98–111)
CO2: 21 MEQ/L (ref 23–33)
COCAINE METABOLITE QUANTITATIVE URINE: NEGATIVE
COLLECTED BY:: 4648
COLOR: ABNORMAL
CREAT SERPL-MCNC: 0.6 MG/DL (ref 0.4–1.2)
CRYSTALS, UA: ABNORMAL
DEVICE: NORMAL
EPITHELIAL CELLS, UA: ABNORMAL /HPF
GFR SERPL CREATININE-BSD FRML MDRD: > 90 ML/MIN/1.73M2
GLUCOSE BLD-MCNC: 121 MG/DL (ref 70–108)
GLUCOSE URINE: NEGATIVE MG/DL
HCO3: 24 MMOL/L (ref 23–28)
HCT VFR BLD CALC: 38.2 % (ref 37–47)
HEMOGLOBIN: 12.2 GM/DL (ref 12–16)
ICTOTEST: NEGATIVE
IFIO2: 2
KETONES, URINE: ABNORMAL
LEUKOCYTE ESTERASE, URINE: NEGATIVE
MCH RBC QN AUTO: 24.6 PG (ref 27–31)
MCHC RBC AUTO-ENTMCNC: 31.9 GM/DL (ref 33–37)
MCV RBC AUTO: 77 FL (ref 81–99)
MISCELLANEOUS 2: ABNORMAL
NITRITE, URINE: NEGATIVE
O2 SATURATION: 95 %
OPIATES, URINE: NEGATIVE
OXYCODONE: NEGATIVE
PCO2: 39 MMHG (ref 35–45)
PDW BLD-RTO: 20.2 % (ref 11.5–14.5)
PH BLOOD GAS: 7.4 (ref 7.35–7.45)
PH UA: 6
PHENCYCLIDINE QUANTITATIVE URINE: NEGATIVE
PLATELET # BLD: 500 THOU/MM3 (ref 130–400)
PMV BLD AUTO: 8.4 MCM (ref 7.4–10.4)
PO2: 75 MMHG (ref 71–104)
POTASSIUM SERPL-SCNC: 3.9 MEQ/L (ref 3.5–5.2)
PROTEIN UA: 30
RBC # BLD: 4.96 MILL/MM3 (ref 4.2–5.4)
RBC URINE: ABNORMAL /HPF
RENAL EPITHELIAL, UA: ABNORMAL
SODIUM BLD-SCNC: 141 MEQ/L (ref 135–145)
SOURCE, BLOOD GAS: NORMAL
SPECIFIC GRAVITY, URINE: 1.03 (ref 1–1.03)
UROBILINOGEN, URINE: 1 EU/DL
WBC # BLD: 11.8 THOU/MM3 (ref 4.8–10.8)
WBC UA: ABNORMAL /HPF
YEAST: ABNORMAL

## 2017-08-31 PROCEDURE — 97166 OT EVAL MOD COMPLEX 45 MIN: CPT

## 2017-08-31 PROCEDURE — 90791 PSYCH DIAGNOSTIC EVALUATION: CPT | Performed by: NURSE PRACTITIONER

## 2017-08-31 PROCEDURE — 97535 SELF CARE MNGMENT TRAINING: CPT

## 2017-08-31 PROCEDURE — 36600 WITHDRAWAL OF ARTERIAL BLOOD: CPT

## 2017-08-31 PROCEDURE — 85027 COMPLETE CBC AUTOMATED: CPT

## 2017-08-31 PROCEDURE — 97110 THERAPEUTIC EXERCISES: CPT

## 2017-08-31 PROCEDURE — 36415 COLL VENOUS BLD VENIPUNCTURE: CPT

## 2017-08-31 PROCEDURE — 82140 ASSAY OF AMMONIA: CPT

## 2017-08-31 PROCEDURE — 81001 URINALYSIS AUTO W/SCOPE: CPT

## 2017-08-31 PROCEDURE — 6370000000 HC RX 637 (ALT 250 FOR IP): Performed by: NURSE PRACTITIONER

## 2017-08-31 PROCEDURE — 2580000003 HC RX 258: Performed by: INTERNAL MEDICINE

## 2017-08-31 PROCEDURE — 6370000000 HC RX 637 (ALT 250 FOR IP): Performed by: INTERNAL MEDICINE

## 2017-08-31 PROCEDURE — 80048 BASIC METABOLIC PNL TOTAL CA: CPT

## 2017-08-31 PROCEDURE — 99233 SBSQ HOSP IP/OBS HIGH 50: CPT | Performed by: FAMILY MEDICINE

## 2017-08-31 PROCEDURE — 82803 BLOOD GASES ANY COMBINATION: CPT

## 2017-08-31 PROCEDURE — 1200000003 HC TELEMETRY R&B

## 2017-08-31 PROCEDURE — 51798 US URINE CAPACITY MEASURE: CPT

## 2017-08-31 PROCEDURE — 80307 DRUG TEST PRSMV CHEM ANLYZR: CPT

## 2017-08-31 PROCEDURE — 94640 AIRWAY INHALATION TREATMENT: CPT

## 2017-08-31 RX ORDER — OMEPRAZOLE 20 MG/1
20 CAPSULE, DELAYED RELEASE ORAL DAILY
Status: ON HOLD | COMMUNITY
End: 2018-08-21

## 2017-08-31 RX ORDER — DEXTROSE, SODIUM CHLORIDE, AND POTASSIUM CHLORIDE 5; .9; .15 G/100ML; G/100ML; G/100ML
INJECTION INTRAVENOUS CONTINUOUS
Status: DISCONTINUED | OUTPATIENT
Start: 2017-08-31 | End: 2017-09-01 | Stop reason: HOSPADM

## 2017-08-31 RX ORDER — CLONAZEPAM 1 MG/1
1 TABLET ORAL NIGHTLY
Status: DISCONTINUED | OUTPATIENT
Start: 2017-08-31 | End: 2017-09-01 | Stop reason: HOSPADM

## 2017-08-31 RX ORDER — LEVOTHYROXINE SODIUM 0.05 MG/1
50 TABLET ORAL DAILY
Status: ON HOLD | COMMUNITY
End: 2018-08-21

## 2017-08-31 RX ORDER — SODIUM CHLORIDE 9 MG/ML
INJECTION, SOLUTION INTRAVENOUS CONTINUOUS
Status: DISCONTINUED | OUTPATIENT
Start: 2017-08-31 | End: 2017-08-31

## 2017-08-31 RX ORDER — LORAZEPAM 1 MG/1
1 TABLET ORAL 3 TIMES DAILY
Status: DISCONTINUED | OUTPATIENT
Start: 2017-08-31 | End: 2017-09-01 | Stop reason: HOSPADM

## 2017-08-31 RX ADMIN — Medication 10 ML: at 01:23

## 2017-08-31 RX ADMIN — Medication 10 ML: at 20:41

## 2017-08-31 RX ADMIN — PANTOPRAZOLE SODIUM 40 MG: 40 TABLET, DELAYED RELEASE ORAL at 06:34

## 2017-08-31 RX ADMIN — Medication 1 PUFF: at 11:47

## 2017-08-31 RX ADMIN — Medication 2 PUFF: at 07:45

## 2017-08-31 RX ADMIN — Medication 1 PUFF: at 07:45

## 2017-08-31 RX ADMIN — CLONAZEPAM 1 MG: 1 TABLET ORAL at 20:40

## 2017-08-31 RX ADMIN — METOPROLOL TARTRATE 12.5 MG: 25 TABLET ORAL at 00:46

## 2017-08-31 RX ADMIN — POTASSIUM CHLORIDE, DEXTROSE MONOHYDRATE AND SODIUM CHLORIDE: 150; 5; 900 INJECTION, SOLUTION INTRAVENOUS at 23:37

## 2017-08-31 RX ADMIN — POTASSIUM CHLORIDE, DEXTROSE MONOHYDRATE AND SODIUM CHLORIDE: 150; 5; 900 INJECTION, SOLUTION INTRAVENOUS at 12:36

## 2017-08-31 RX ADMIN — Medication 1 PUFF: at 18:42

## 2017-08-31 RX ADMIN — POTASSIUM CHLORIDE, DEXTROSE MONOHYDRATE AND SODIUM CHLORIDE: 150; 5; 900 INJECTION, SOLUTION INTRAVENOUS at 01:20

## 2017-08-31 RX ADMIN — METOPROLOL TARTRATE 12.5 MG: 25 TABLET ORAL at 20:39

## 2017-08-31 RX ADMIN — LORAZEPAM 1 MG: 1 TABLET ORAL at 20:40

## 2017-08-31 RX ADMIN — METOPROLOL TARTRATE 12.5 MG: 25 TABLET ORAL at 10:37

## 2017-08-31 RX ADMIN — LEVOTHYROXINE SODIUM 50 MCG: 50 TABLET ORAL at 06:34

## 2017-08-31 RX ADMIN — LORAZEPAM 1 MG: 1 TABLET ORAL at 16:47

## 2017-08-31 NOTE — H&P
History & Physical        Patient:  Arlee Osgood  YOB: 1955    MRN: [id number]     Acct: [de-identified]    PCP: Qasim Simental CNP    Date of Admission: 8/30/2017    Date of Service: Pt seen/examined on 8/30/17 and Admitted to Inpatient with expected LOS greater than two midnights due to medical therapy. Chief Complaint:  Confusion and disorientation. History Of Present Illness:      64 y.o. female who lives alone and was found to be confused by a visiting family member, who then called the EMS. Patient was transported to [facility] where she continued to be confused even in regard to her birth date and age, and even much of her personal history. Patient denied alcohol or drug abuse, but family reports that she has a known history of cocaine and THC abuse. The requested tox screen has not yet been performed because patient has not given a urine sample. She has been diagnosed with dehydration and put on IV fluids. In the emergency department the CT scan of the patient's head was without any acute pathology. Chest x-ray was also read as normal.  She was noted to have an elevated white cell count of 16,000, BUN elevated at 42, with a creatinine of 0.7. This indicates a pre-renal azotemia suggestive of dehydration.     Past Medical History:          Diagnosis Date    Bipolar disorder ([address]     Cerebral artery occlusion with cerebral infarction ([address]     Chronic back pain     Colon polyps     COPD (chronic obstructive pulmonary disease) (HCC)     Depression     Dermatitis seborrheica     DJD (degenerative joint disease)     Hyperlipidemia     Multiple sclerosis ([address] dx 2003    Relapse/Remitting type    Need for other prophylactic chemotherapy     Obesity     Osteoarthritis     Back    Osteopenia     Panic disorder without agoraphobia     Personal history of fall     Schizoaffective disorder (HCC)     Secondary parkinsonism ([address]     Seizures ([address]  Sinusitis     Tension headache, chronic     Weight loss        Past Surgical History:          Procedure Laterality Date    APPENDECTOMY      CARDIOVASCULAR STRESS TEST  5/2011    COLONOSCOPY      7/2014    HYSTERECTOMY      MAMMO IMPLANT DIGITAL DIAG BI  06/2011    repeat in 12/2011    SHOULDER SURGERY      LEONILA AND BSO         Medications Prior to Admission:      Prior to Admission medications    Medication Sig Start Date End Date Taking? Authorizing Provider   buPROPion (WELLBUTRIN XL) 300 MG extended release tablet TAKE ONE TABLET BY MOUTH DAILY IN THE MORNING 8/16/17   Ella Mtz MD   amphetamine-dextroamphetamine (ADDERALL) 20 MG tablet TAKE ONE TABLET BY MOUTH TWICE DAILY. 8/7/17   Ella Mtz MD   [facility] [place] [facility] MISC Dispense 1 Power Mobility Device 7/26/17   Silvana Sales CNP   clonazePAM (KLONOPIN) 2 MG tablet TAKE TWO TABLETS BY MOUTH DAILY AT BEDTIME 7/5/17   Ella Mtz MD   LORazepam (ATIVAN) 1 MG tablet TAKE ONE TABLET BY MOUTH TWICE DAILY AS NEEDED 7/5/17   Ella Mtz MD   diclofenac (CATAFLAM) 50 MG tablet Take 1 tablet by mouth 3 times daily 6/18/17   Bharat Crockett MD   QUEtiapine (SEROQUEL) 300 MG tablet Take 2 tablets by mouth nightly 5/8/17   Ella Mtz MD   lisdexamfetamine (VYVANSE) 70 MG capsule Take 1 capsule by mouth every morning .  5/5/17   Ella Mtz MD   gabapentin (NEURONTIN) 800 MG tablet Take 1 tablet by mouth 4 times daily 4/6/17   Silvana Sales CNP   buPROPion (WELLBUTRIN XL) 300 MG extended release tablet Take 300 mg by mouth every morning    Historical Provider, MD   tiZANidine (ZANAFLEX) 4 MG tablet Take 1 tablet by mouth 3 times daily 2/10/17   Silvana Sales CNP   metoprolol tartrate (LOPRESSOR) 25 MG tablet Take 0.5 tablets by mouth 2 times daily 2/6/17   Silvana Sales CNP   omeprazole (PRILOSEC) 20 MG delayed release capsule Take  1 capsule in morning 2/6/17   iSlvana Sales CNP   ranitidine (ZANTAC) 150 MG tablet negative. PHYSICAL EXAM:    /66  Pulse 88  Temp 98.1 °F (36.7 °C) (Oral)   Resp 18  Ht 5' 6\" (1.676 m)  Wt 175 lb (79.4 kg)  SpO2 93%  BMI 28.25 kg/m2    General appearance:  No apparent distress, appears stated age and cooperative, but oddly unable to also personal information, and unable to follow complex commands. Justin Pelletier HEENT:  Normal cephalic, atraumatic without obvious deformity. Pupils equal, round, and reactive to light. Extra ocular muscles intact. Conjunctivae/corneas clear. Neck: Supple, with full range of motion. No jugular venous distention. Trachea midline. Respiratory:  Normal respiratory effort. Clear to auscultation, bilaterally without Rales/Wheezes/Rhonchi. Cardiovascular:  Regular rate and rhythm with normal S1/S2 without murmurs, rubs or gallops. Abdomen: Soft, non-tender, non-distended with normal bowel sounds. Musculoskeletal:  No clubbing, cyanosis or edema bilaterally. Full range of motion without deformity. Skin: Skin color, texture, turgor normal.  No rashes or lesions. Neurologic:  Neurovascularly intact without any focal sensory/motor deficits. Cranial nerves: II-XII intact, grossly non-focal.  Psychiatric:  Alert and oriented, thought content appropriate, normal insight  Capillary Refill: Brisk,< 3 seconds   Peripheral Pulses: +2 palpable, equal bilaterally       Labs:     Recent Labs      08/30/17   1806   WBC  15.9*   HGB  13.2   HCT  40.6   PLT  578*     Recent Labs      08/30/17   1806   NA  141   K  3.7   CL  103   CO2  24   BUN  42*   CREATININE  0.7   CALCIUM  10.1     Recent Labs      08/30/17   1806   AST  10   ALT  9*   BILITOT  0.4   ALKPHOS  101     Recent Labs      08/30/17   1806   INR  1.02     No results for input(s): Marshall Dus in the last 72 hours.     Urinalysis:      Lab Results   Component Value Date    NITRU NEGATIVE 06/18/2017    WBCUA 0-2 06/18/2017    BACTERIA NONE 06/18/2017    RBCUA 0-2 06/18/2017    BLOODU NEGATIVE 06/18/2017 Xin Mancilla 994 NEGATIVE 06/18/2017       Radiology:       CT Head WO Contrast   Final Result    No evidence of an acute process. No change from prior. **This report has been created using voice recognition software. It may contain minor errors which are inherent in voice recognition technology. **      Final report electronically signed by Dr. Dylan Guzman on 8/30/2017 6:13 PM      XR Chest Standard TWO VW   Final Result   Stable radiographic appearance of the chest. No evidence of an acute process. **This report has been created using voice recognition software. It may contain minor errors which are inherent in voice recognition technology. **      Final report electronically signed by Dr. Dylan Guzman on 8/30/2017 5:56 PM               DVT prophylaxis: [] Lovenox                                 [x] SCDs                                 [] SQ Heparin                                 [] Encourage ambulation           [] Already on Anticoagulation    Code Status: Prior      PT/OT Eval Status: Requested. Disposition:    [] Home       [] TCU       [] Rehab       [] Psych       [] SNF       [] [place]       [] Other-    ASSESSMENT:    Active Hospital Problems    Diagnosis Date Noted    Altered mental state [R41.82] 08/30/2017    Confusion and disorientation [F99] 08/30/2017    Chronic bilateral low back pain without sciatica [M54.5, G89.29] 02/10/2017    Schizoaffective disorder, depressive type ([facility] [F25.1] 04/27/2016    Recurrent falls [R29.6] 08/09/2013    Secondary parkinsonism ([facility] [G21.9]     Polypharmacy [Z79.899] 09/05/2012    MS (multiple sclerosis) ([facility] Aviva Carlos Eduardo 08/01/2012    Cannabis abuse [F12.10] 08/01/2012    History of seizures [Z87.898] 08/01/2012       PLAN:    Patient's cognitive deficits appear odd, but real.  There is the possibility of a psychiatric component. Have consulted neurology, but will also consult psychiatry if available.           Thank you Philippe Correa CNP for the opportunity to be involved in this patient's care.     Electronically signed by Cally Cook MD on 8/30/2017 at 9:11 PM

## 2017-08-31 NOTE — ED NOTES
Unable to obtain urine specimen. Patient ambulated to restroom with strong steady gait.      Sarahy Quiroga RN  08/30/17 2016

## 2017-08-31 NOTE — PROGRESS NOTES
Patient saying she is unable to void, updated Dr Hanna Post, bladder scan ordered. 211ml in bladder, updated Dr Hanna Post of bladder scan and patients increased agitation.

## 2017-08-31 NOTE — PROGRESS NOTES
Order to straight cath patient for >200ml on bladder scan. Pt tolerated well. 350ml dark yellow urine drained from bladder. Urine sent to lab.

## 2017-08-31 NOTE — CONSULTS
Associates stated that she has been dismissed from practice after several no shows. Hx of schizoaffective disorder, bipolar type. No family member to call for collateral. Patient lives alone per prior note. Hx of MS. Slightly elevated BUN and platelet counts     Psychiatric History:    The patient is currently receiving care for the above psychiatric illness. Past mental health hospitalizations: Patient unable to give history at this time    Medications:   albuterol sulfate HFA  1 puff Inhalation 4x daily    pantoprazole  40 mg Oral QAM AC    metoprolol tartrate  12.5 mg Oral BID    levothyroxine  50 mcg Oral Daily    mometasone-formoterol  2 puff Inhalation BID    sodium chloride flush  10 mL Intravenous 2 times per day     Continuous Infusions:   dextrose 5% and 0.9% NaCl with KCl 20 mEq 100 mL/hr at 08/31/17 1236     PRN Meds:sodium chloride flush, acetaminophen, magnesium hydroxide, ondansetron    Allergies:    Review of patient's allergies indicates no known allergies. Social History:  TOBACCO:  Patient unable to give history at this time  ETOH:  See above  DRUGS:  Current drug usage.     SEXUAL HISTORY:  Patient unable to give history at this time  ACTIVITIES OF DAILY LIVING: Patient unable to give history at this time  INSTRUMENTAL ACTIVITIES OF DAILY LIVING:  Patient unable to give history at this time  MARITAL STATUS:Patient unable to give history at this time  OCCUPATION:  Patient unable to give history at this time  LEVEL OF EDUCATION: Patient unable to give history at this time  Patient currently lives alone    Mental Status:    Patient is awake and uncooperative and withdrawn  Oriented to:person  Patient Displayed: decreased productivity and mute   Cooperation: poor   Affect is blunt  Mood is  Patient unable to give history at this time  Thought process is blocked  Thought content: Patient unable to give history at this time  Suicidal ideation Patient unable to give history at this

## 2017-08-31 NOTE — PROGRESS NOTES
seborrheica     DJD (degenerative joint disease)     Hyperlipidemia     Multiple sclerosis ([facility] [address] dx 2003    Relapse/Remitting type    Need for other prophylactic chemotherapy     Obesity     Osteoarthritis     Back    Osteopenia     Panic disorder without agoraphobia     Personal history of fall     Schizoaffective disorder (HCC)     Secondary parkinsonism (HCC)     Seizures (HCC)     Sinusitis     Tension headache, chronic     Weight loss      Past Surgical History:   Procedure Laterality Date    APPENDECTOMY      CARDIOVASCULAR STRESS TEST  5/2011    COLONOSCOPY      7/2014    HYSTERECTOMY      MAMMO IMPLANT DIGITAL DIAG BI  06/2011    repeat in 12/2011    SHOULDER SURGERY      LEONILA AND BSO             Subjective  Chart Reviewed: Yes (completed medical chart review including therapy orders & progress notes)  Patient assessed for rehabilitation services?: Yes  Family / Caregiver Present: No    Subjective: Pt in bed upon arrival. Agreeable to OT eval & treat this date. Confused, but cooperative  & able to follow commands. Comments: Lindy RN approved session this am    General:  Overall Orientation Status: Impaired (Pt able to ID place when given choice of two. Not able to ID name of hospital.  Disoriented to date, could not ID month day or year.  Pt not able to state why she came in to the hospital.)    Vision: Within Functional Limits    Hearing: Within functional limits    Pain:   Denies       Social/Functional:  Lives With: Alone  Type of Home: House  Home Layout: Two level, Able to Live on Main level with bedroom/bathroom  Home Access: Level entry  Home Equipment: Quad cane     Bathroom Shower/Tub: Walk-in shower  Bathroom Toilet: Standard  Bathroom Equipment: Shower chair       ADL Assistance: Independent  Homemaking Assistance: Independent       Ambulation Assistance: Independent  Transfer Assistance: Independent    Active : No  Additional Comments: Pt reports use of quad cane Niharika Ora with ADLs & homemaking as patient lives alone. History per patient. Objective        Overall Cognitive Status: Exceptions  Cognition Comment: decreased insight, impulsivity, decreased initiation, decreased sequencing. Able to follow one step commands well. Sensation  Overall Sensation Status: WNL               LUE AROM (degrees)  LUE AROM : WFL          RUE AROM (degrees)  RUE AROM : WFL       LUE Strength  Gross LUE Strength: Exceptions to [facility] [place] [employer] [place]  L Hand Grasp: 4/5  LUE Strength Comment: Shoulder flex/ext 3-/5, elbow flex/ext 3/5        RUE Strength  Gross RUE Strength: Exceptions to [facility] [place]  R Hand Grasp: 4/5  RUE Strength Comment: Shoulder flex/ext 3-/5, elbow flex/ext 3/5          ADL  Grooming: Stand by assistance (for washing face & oral care. pt required mod vcs for intiation sequencing of grooming tasks. Completed standing at [facility])  LE Dressing: Stand by assistance (with max vcs and demonstration. pt able to adjust socks sitting EOB)          Transfers  Sit to stand: Stand by assistance (EOB)  Stand to sit: Stand by assistance (recliner)    Balance  Sitting Balance: Supervision (EOB & recliner)  Standing Balance: Stand by assistance     Time: x30 seconds  Activity:  in prep for functional mobility     Functional Mobility  Functional - Mobility Device: Cane (quad cane)  Activity: To/from bathroom  Assist Level: Stand by assistance  Functional Mobility Comments: min impulsivity noted. No unsteadiness or LOB noted during mobility. Vcs given to turn around in bathroom to not get \"tangled\" in IV & O2 wires. Bed Mobility  Supine to Sit: Stand by assistance (with HOB min elevated)  Scooting: Stand by assistance     Activity Tolerance:  Activity Tolerance: Treatment limited secondary to decreased cognition    Treatment Initiated:  Pt completed dynamic standing at the sink with SBA x 6 minutes to complete grooming tasks.  Pt required mod vcs for initiation & sequencing of grooming tasks at sink, assess    Safety:  Safety Devices in place: Yes  Type of devices: All fall risk precautions in place, Call light within reach, Chair alarm in place, Gait belt, Patient at risk for falls, Left in chair, Nurse notified    Plan:  Times per week: 5x  Current Treatment Recommendations: Strengthening, ROM, Functional Mobility Training, Safety Education & Training, Self-Care / ADL    Goals:  Patient goals : Pt reported not having any goals    Short term goals  Time Frame for Short term goals: 1 week  Short term goal 1: Pt will demo functional mobility to/from bathroom with quad cane & S with good safety and use of equipment for increased safety with toileting routine  Short term goal 2: Pt will demo standing grooming task at sink with S & no more than 2 vcs for initiation or sequencing of task for increased indep with self care  Short term goal 3: Pt will demo LB dressing with S & no more than 2 vcs for initiation of task for increased indep with dressing   Short term goal 4: Pt will tolerate BUE AROM resistive exercises x 10 reps with no RBs & 0-1 vcs for technique for increased strength for homemaking tasks  Short term goal 5: Pt will complete light homemaking task with SBA & no more than 2 vcs for intiiation or sequencing of task for increased indep & safety with homemaking tasks  Long term goals  Time Frame for Long term goals : No LTG established d/t short ELOS    Evaluation Complexity: Based on the findings of patient history, examination, clinical presentation, and decision making during this evaluation, this patient is of medium complexity.

## 2017-08-31 NOTE — PROCEDURES
A Bladder scan was performed at 0443 . The patient's last void was at incontinent . The residual amount was measured to be 378 ML. Report of results was given to [facility].

## 2017-08-31 NOTE — PROCEDURES
A Bladder scan was performed at 1345 . The patient's last void was at ? Ritta Senters The residual amount was measured to be 211 ML. Report of results was given to [address] [place] [address].

## 2017-08-31 NOTE — PROGRESS NOTES
apparent distress, appears stated age and cooperative, wakes easily, follows commands. Oriented to person only. HEENT: Pupils equal, round, and reactive to light. Conjunctivae/corneas clear. MM dry. Neck: Supple, with full range of motion. No jugular venous distention. Trachea midline. No neck TTP. Respiratory:  Normal respiratory effort. Clear to auscultation, bilaterally without Rales/Wheezes/Rhonchi. No distress or accessory muscle use  Cardiovascular: Regular rate and rhythm with normal S1/S2 without murmurs, rubs or gallops. Abdomen: Soft, non-tender, non-distended with normal bowel sounds. NO HSM, no rebound or guarding. Musculoskeletal: No clubbing, cyanosis or edema bilaterally. Full range of motion without deformity. Skin: Skin color, texture, turgor normal.  No rashes or lesions. Neurologic:  Neurovascularly intact without any focal sensory/motor deficits. Cranial nerves: II-XII intact, grossly non-focal.  No meningeal signs  Psychiatric: Alert and oriented x person only, only nods no to questions, poor eye contact  Capillary Refill: Brisk,< 3 seconds   Peripheral Pulses: +2 palpable, equal bilaterally       Labs:   Recent Labs      08/30/17   1806 08/31/17   0528   WBC  15.9*  11.8*   HGB  13.2  12.2   HCT  40.6  38.2   PLT  578*  500*     Recent Labs      08/30/17   1806  08/31/17   0528   NA  141  141   K  3.7  3.9   CL  103  106   CO2  24  21*   BUN  42*  37*   CREATININE  0.7  0.6   CALCIUM  10.1  9.4     Recent Labs      08/30/17   1806   AST  10   ALT  9*   BILITOT  0.4   ALKPHOS  101     Recent Labs      08/30/17   1806   INR  1.02     No results for input(s): Damián Hem in the last 72 hours.     Urinalysis:    Lab Results   Component Value Date    NITRU NEGATIVE 08/31/2017    WBCUA 0-2 08/31/2017    BACTERIA NONE 08/31/2017    RBCUA 0-2 08/31/2017    BLOODU NEGATIVE 08/31/2017    GLUCOSEU NEGATIVE 08/31/2017       Radiology:  CT Head WO Contrast   Final Result    No evidence

## 2017-09-01 VITALS
WEIGHT: 171.2 LBS | DIASTOLIC BLOOD PRESSURE: 69 MMHG | HEIGHT: 66 IN | OXYGEN SATURATION: 98 % | BODY MASS INDEX: 27.51 KG/M2 | TEMPERATURE: 97.3 F | HEART RATE: 71 BPM | RESPIRATION RATE: 17 BRPM | SYSTOLIC BLOOD PRESSURE: 129 MMHG

## 2017-09-01 LAB
ANION GAP SERPL CALCULATED.3IONS-SCNC: 14 MEQ/L (ref 8–16)
ANISOCYTOSIS: ABNORMAL
BASOPHILS # BLD: 0.5 %
BASOPHILS ABSOLUTE: 0 THOU/MM3 (ref 0–0.1)
BUN BLDV-MCNC: 21 MG/DL (ref 7–22)
CALCIUM SERPL-MCNC: 8.9 MG/DL (ref 8.5–10.5)
CHLORIDE BLD-SCNC: 107 MEQ/L (ref 98–111)
CO2: 21 MEQ/L (ref 23–33)
CREAT SERPL-MCNC: 0.6 MG/DL (ref 0.4–1.2)
EOSINOPHIL # BLD: 3.1 %
EOSINOPHILS ABSOLUTE: 0.3 THOU/MM3 (ref 0–0.4)
FERRITIN: 25 NG/ML (ref 10–291)
FOLATE: 12.4 NG/ML (ref 4.8–24.2)
GFR SERPL CREATININE-BSD FRML MDRD: > 90 ML/MIN/1.73M2
GLUCOSE BLD-MCNC: 109 MG/DL (ref 70–108)
HCT VFR BLD CALC: 35.4 % (ref 37–47)
HEMOGLOBIN: 11.2 GM/DL (ref 12–16)
HYPOCHROMIA: ABNORMAL
IRON: 47 UG/DL (ref 50–170)
LYMPHOCYTES # BLD: 17.3 %
LYMPHOCYTES ABSOLUTE: 1.4 THOU/MM3 (ref 1–4.8)
MCH RBC QN AUTO: 24.4 PG (ref 27–31)
MCHC RBC AUTO-ENTMCNC: 31.8 GM/DL (ref 33–37)
MCV RBC AUTO: 76.8 FL (ref 81–99)
MICROCYTES: ABNORMAL
MONOCYTES # BLD: 10.8 %
MONOCYTES ABSOLUTE: 0.9 THOU/MM3 (ref 0.4–1.3)
NUCLEATED RED BLOOD CELLS: 0 /100 WBC
PDW BLD-RTO: 20.8 % (ref 11.5–14.5)
PLATELET # BLD: 418 THOU/MM3 (ref 130–400)
PMV BLD AUTO: 8.6 MCM (ref 7.4–10.4)
POTASSIUM SERPL-SCNC: 3.8 MEQ/L (ref 3.5–5.2)
RBC # BLD: 4.61 MILL/MM3 (ref 4.2–5.4)
RBC # BLD: NORMAL 10*6/UL
SEG NEUTROPHILS: 68.3 %
SEGMENTED NEUTROPHILS ABSOLUTE COUNT: 5.7 THOU/MM3 (ref 1.8–7.7)
SODIUM BLD-SCNC: 142 MEQ/L (ref 135–145)
VITAMIN B-12: 273 PG/ML (ref 211–911)
WBC # BLD: 8.3 THOU/MM3 (ref 4.8–10.8)

## 2017-09-01 PROCEDURE — 6370000000 HC RX 637 (ALT 250 FOR IP): Performed by: INTERNAL MEDICINE

## 2017-09-01 PROCEDURE — 97530 THERAPEUTIC ACTIVITIES: CPT

## 2017-09-01 PROCEDURE — 97535 SELF CARE MNGMENT TRAINING: CPT

## 2017-09-01 PROCEDURE — 83540 ASSAY OF IRON: CPT

## 2017-09-01 PROCEDURE — 36415 COLL VENOUS BLD VENIPUNCTURE: CPT

## 2017-09-01 PROCEDURE — 99239 HOSP IP/OBS DSCHRG MGMT >30: CPT | Performed by: FAMILY MEDICINE

## 2017-09-01 PROCEDURE — 80048 BASIC METABOLIC PNL TOTAL CA: CPT

## 2017-09-01 PROCEDURE — 94640 AIRWAY INHALATION TREATMENT: CPT

## 2017-09-01 PROCEDURE — 82746 ASSAY OF FOLIC ACID SERUM: CPT

## 2017-09-01 PROCEDURE — 85025 COMPLETE CBC W/AUTO DIFF WBC: CPT

## 2017-09-01 PROCEDURE — G8980 MOBILITY D/C STATUS: HCPCS

## 2017-09-01 PROCEDURE — 97161 PT EVAL LOW COMPLEX 20 MIN: CPT

## 2017-09-01 PROCEDURE — 82607 VITAMIN B-12: CPT

## 2017-09-01 PROCEDURE — G8979 MOBILITY GOAL STATUS: HCPCS

## 2017-09-01 PROCEDURE — 2580000003 HC RX 258: Performed by: INTERNAL MEDICINE

## 2017-09-01 PROCEDURE — 82728 ASSAY OF FERRITIN: CPT

## 2017-09-01 PROCEDURE — 6370000000 HC RX 637 (ALT 250 FOR IP): Performed by: NURSE PRACTITIONER

## 2017-09-01 PROCEDURE — G8978 MOBILITY CURRENT STATUS: HCPCS

## 2017-09-01 PROCEDURE — 95819 EEG AWAKE AND ASLEEP: CPT

## 2017-09-01 RX ADMIN — POTASSIUM CHLORIDE, DEXTROSE MONOHYDRATE AND SODIUM CHLORIDE: 150; 5; 900 INJECTION, SOLUTION INTRAVENOUS at 09:00

## 2017-09-01 RX ADMIN — Medication 1 PUFF: at 15:43

## 2017-09-01 RX ADMIN — LORAZEPAM 1 MG: 1 TABLET ORAL at 14:38

## 2017-09-01 RX ADMIN — Medication 2 PUFF: at 08:46

## 2017-09-01 RX ADMIN — PANTOPRAZOLE SODIUM 40 MG: 40 TABLET, DELAYED RELEASE ORAL at 04:32

## 2017-09-01 RX ADMIN — METOPROLOL TARTRATE 12.5 MG: 25 TABLET ORAL at 08:43

## 2017-09-01 RX ADMIN — Medication 1 PUFF: at 11:58

## 2017-09-01 RX ADMIN — LEVOTHYROXINE SODIUM 50 MCG: 50 TABLET ORAL at 04:32

## 2017-09-01 RX ADMIN — Medication 1 PUFF: at 08:46

## 2017-09-01 RX ADMIN — LORAZEPAM 1 MG: 1 TABLET ORAL at 08:43

## 2017-09-01 NOTE — DISCHARGE SUMMARY
(L) 27.0 - 31.0 pg    MCHC 32.5 (L) 33.0 - 37.0 gm/dl    RDW 20.9 (H) 11.5 - 14.5 %    Platelets 992 (H) 851 - 400 thou/mm3    MPV 8.4 7.4 - 10.4 mcm    RBC Morphology NORMAL     Seg Neutrophils 85.5 %    Lymphocytes 7.2 %    Monocytes 6.6 %    Eosinophils 0.7 %    Basophils 0.0 %    nRBC 0 /100 wbc    Microcytes 1+     Anisocytosis 2+     Hypochromia 1+     Segs Absolute 13.6 (H) 1.8 - 7.7 thou/mm3    Lymphocytes # 1.1 1.0 - 4.8 thou/mm3    Monocytes # 1.0 0.4 - 1.3 thou/mm3    Eosinophils # 0.1 0.0 - 0.4 thou/mm3    Basophils # 0.0 0.0 - 0.1 thou/mm3   Comprehensive Metabolic Panel    Collection Time: 08/30/17  6:06 PM   Result Value Ref Range    Glucose 128 (H) 70 - 108 mg/dL    CREATININE 0.7 0.4 - 1.2 mg/dL    BUN 42 (H) 7 - 22 mg/dL    Sodium 141 135 - 145 meq/L    Potassium 3.7 3.5 - 5.2 meq/L    Chloride 103 98 - 111 meq/L    CO2 24 23 - 33 meq/L    Calcium 10.1 8.5 - 10.5 mg/dL    AST 10 5 - 40 U/L    Alkaline Phosphatase 101 38 - 126 U/L    Total Protein 8.1 (H) 6.1 - 8.0 g/dL    Alb 4.6 3.5 - 5.1 g/dL    Total Bilirubin 0.4 0.3 - 1.2 mg/dL    ALT 9 (L) 11 - 66 U/L   Ethanol    Collection Time: 08/30/17  6:06 PM   Result Value Ref Range    ETHYL ALCOHOL, SERUM < 0.01 0.00 %   TSH without Reflex    Collection Time: 08/30/17  6:06 PM   Result Value Ref Range    TSH 2.190 0.400 - 4.20 uIU/mL   APTT    Collection Time: 08/30/17  6:06 PM   Result Value Ref Range    aPTT 33.6 22.0 - 38.0 seconds   Troponin    Collection Time: 08/30/17  6:06 PM   Result Value Ref Range    Troponin T < 0.010 ng/ml   Lipase    Collection Time: 08/30/17  6:06 PM   Result Value Ref Range    Lipase 27.2 5.6 - 51.3 U/L   Lactic acid, plasma    Collection Time: 08/30/17  6:06 PM   Result Value Ref Range    Lactic Acid 1.0 0.5 - 2.2 mmol/L   Protime-INR    Collection Time: 08/30/17  6:06 PM   Result Value Ref Range    INR 1.02 0.85 - 1.13   Glomerular Filtration Rate, Estimated    Collection Time: 08/30/17  6:06 PM   Result Value Ref

## 2017-09-01 NOTE — PROGRESS NOTES
[facility] Laboratory Technician Worksheet      EEG Date: 2017    Name: Pattie Sousa   : 1955   Age: 64 y.o.   SEX: female    ROOM: [address] MRN: [id number]           CSN: [id number]    Ordering Provider: Raza  EEG Number: [id number] Time of Test:  913    Hand: -   Sedation: no    H.V. Done: No not attempted Photic: Yes    Sleep: Yes  Drowsy: Yes   Sleep Deprived: No    Seizures observed: no    Technician Impression:2    Mentality: lethargic      Clinical History: DX:  AMS    Agitation   Confusion   H/O substance abuse    Head CT shows no acute process    Past Medical History:       Diagnosis Date    Bipolar disorder ([facility] [address]     Cerebral artery occlusion with cerebral infarction ([facility] [address]     Chronic back pain     Colon polyps     COPD (chronic obstructive pulmonary disease) (HCC)     Depression     Dermatitis seborrheica     DJD (degenerative joint disease)     Hyperlipidemia     Multiple sclerosis ([facility] [address] dx     Relapse/Remitting type    Need for other prophylactic chemotherapy     Obesity     Osteoarthritis     Back    Osteopenia     Panic disorder without agoraphobia     Personal history of fall     Schizoaffective disorder (HCC)     Secondary parkinsonism (HCC)     Seizures (HCC)     Sinusitis     Tension headache, chronic     Weight loss        Scheduled Meds:   LORazepam  1 mg Oral TID    clonazePAM  1 mg Oral Nightly    albuterol sulfate HFA  1 puff Inhalation 4x daily    pantoprazole  40 mg Oral QAM AC    metoprolol tartrate  12.5 mg Oral BID    levothyroxine  50 mcg Oral Daily    mometasone-formoterol  2 puff Inhalation BID    sodium chloride flush  10 mL Intravenous 2 times per day     Continuous Infusions:   dextrose 5% and 0.9% NaCl with KCl 20 mEq 100 mL/hr at 17 2337     PRN Meds:.sodium chloride flush, acetaminophen, magnesium hydroxide, ondansetron    Technician: Claire Menchaca 2017

## 2017-09-01 NOTE — PROGRESS NOTES
Call out to Dr Lopez Sparks regarding seeing patient for consult. Awaiting response.    1235: Dr Olmstead Fairly in to see patient, okay for discharge

## 2017-09-01 NOTE — CARE COORDINATION
DISCHARGE BARRIERS  9/1/17, 1:45 PM    Reason for Referral: d/c needs  Mental Status: Alert and oriented  Decision Making: Yes  Family/Social/Home Environment: Spoke with patient. She resides in an apartment in [place]. Patient reported her brother was staying with her however since she has been in the hospital her landlady had went and kicked her brother out of the apartment. Patient was seeing Dr. Henriette Lesches however had missed several appointments and was discontinued from the practice. Patient reported her psychiatric medication is being prescribed by her PCP. Patient stated her medications are delivered to her home. Patient reported she has a nurse that comes out however she is not sure what agency. Patient did state she has the [place] however did not know CM name, states she has the paper at home. Patient reports she has a walker and uses [employer] on [employer] for transportation. Current Services: See above  Current Equipment: See above  Payment Source: Medicare and Medicaid  Concerns or Barriers to Discharge: None noted  Collabrative List of ECF/HH were provided: N/A    Teach Back Method used with patient regarding care plan and discharge plans. Patient verbalize understanding of the plan of care and contribute to goal setting. Anticipated Needs/Discharge Plan: Home with current services and additional skilled services. Patient reported she think [facility] is through [employer], SW called however they have not had her since 2014. SW called several other local [facility] agencies and none had patient listed. Patient agreeable to RN and SW coming into the home for services. Patient stated she plans to get a house in [place] where she and her brother can live. SW attempted to contact PCP to confirm what patient had stated however office closed at 12pm today.      Electronically signed by OCHAO Frausto LSW on 9/1/2017 at 1:45 PM
Medications:  No  Does patient want to participate in local refill/ meds to beds program?  No  Type of Home Care Services:  None  Patient expects to be discharged to:  home  Expected Discharge date:  09/02/17  Follow Up Appointment: Best Day/ Time: Thursday AM    Discharge Plan: Talked with pt. She is confused. No family present.  Pt comes from home alone per nursing assessment     Evaluation: not at this time

## 2017-09-01 NOTE — PLAN OF CARE
Problem: DISCHARGE BARRIERS  Goal: Patients continuum of care needs are met  Outcome: Ongoing  Home with new [facility] services. See  progress notes.

## 2017-09-01 NOTE — PROGRESS NOTES
flush  10 mL Intravenous 2 times per day     Continuous Infusions:   dextrose 5% and 0.9% NaCl with KCl 20 mEq 100 mL/hr at 08/31/17 2337       I/O last 3 completed shifts: In: 3384.7 [P.O.:850; I.V.:2534.7]  Out: 0        Intake/Output Summary (Last 24 hours) at 09/01/17 0926  Last data filed at 09/01/17 0430   Gross per 24 hour   Intake          3384.66 ml   Output                0 ml   Net          3384.66 ml     CBC:   Recent Labs      08/30/17   1806 08/31/17   0528  09/01/17   0508   WBC  15.9*  11.8*  8.3   HGB  13.2  12.2  11.2*   HCT  40.6  38.2  35.4*   PLT  578*  500*  418*     BMP:  Recent Labs      08/30/17   1806 08/31/17   0528  09/01/17   0508   NA  141  141  142   K  3.7  3.9  3.8   CL  103  106  107   CO2  24  21*  21*   BUN  42*  37*  21   CREATININE  0.7  0.6  0.6   GLUCOSE  128*  121*  109*     Hepatic: Recent Labs      08/30/17 1806   AST  10   ALT  9*   BILITOT  0.4   ALKPHOS  101     Urine:       MICRO:   Lab Results   Component Value Date    BC No growth-preliminary  No growth   09/24/2014       IMAGING per radiologist interpretation:       Assessment    Leukocytosis resolved  AMS  Metabolic encephalopathy   Daily ETOH use   Schizoaffective disorder  MS  Possible urinary retention      Plan   No need for antibiotics   ID will sign off--recall if needed   Neurology workup in progress     Please see today's orders for updated patient plan of care. Noted scribed in real time of face to face encounter by Dr Cosme Velásquez, transcribed by Solis Enrique CNP in [facility] after rounds were completed. Electronically signed by Sergey Stephenson CNP on 9/1/2017 at 9:26 AM    I have reviewed and agree with the above note. The information is true and accurate.    Electronically signed by Ann Marie Aguirre MD on 9/1/17 at 2:24 PM

## 2017-09-01 NOTE — PROGRESS NOTES
to/from bathroom, min unsteadiness noted, impulsivenessn noted, decreased insight, fair pace, no LOB      Activity Tolerance:  Activity Tolerance: Patient Tolerated treatment well    Assessment:  Performance deficits / Impairments: Decreased ADL status, Decreased functional mobility , Decreased ROM, Decreased strength, Decreased safe awareness, Decreased cognition, Decreased high-level IADLs  Prognosis: Fair  Discharge Recommendations: Continue to assess pending progress    Patient Education:  Patient Education: Role of OT, BADL tasks,   Barriers to Learning: cognition    Equipment Recommendations: Other: Continue to assess    Safety:  Safety Devices in place: Yes  Type of devices:  All fall risk precautions in place, Call light within reach, Gait belt, Patient at risk for falls, Nurse notified, Left in bed, Bed alarm in place    Plan:  Times per week: 5x  Current Treatment Recommendations: Strengthening, ROM, Functional Mobility Training, Safety Education & Training, Self-Care / ADL    Goals:  Patient goals : Pt reported not having any goals    Short term goals  Time Frame for Short term goals: 1 week  Short term goal 1: Pt will demo functional mobility to/from bathroom with quad cane & S with good safety and use of equipment for increased safety with toileting routine  Short term goal 2: Pt will demo standing grooming task at sink with S & no more than 2 vcs for initiation or sequencing of task for increased indep with self care  Short term goal 3: Pt will demo LB dressing with S & no more than 2 vcs for initiation of task for increased indep with dressing   Short term goal 4: Pt will tolerate BUE AROM resistive exercises x 10 reps with no RBs & 0-1 vcs for technique for increased strength for homemaking tasks  Short term goal 5: Pt will complete light homemaking task with SBA & no more than 2 vcs for intiiation or sequencing of task for increased indep & safety with homemaking tasks  Long term goals  Time Frame

## 2017-09-01 NOTE — PROGRESS NOTES
Osteoarthritis     Back    Osteopenia     Panic disorder without agoraphobia     Personal history of fall     Schizoaffective disorder (HCC)     Secondary parkinsonism (HCC)     Seizures (HCC)     Sinusitis     Tension headache, chronic     Weight loss      Past Surgical History:   Procedure Laterality Date    APPENDECTOMY      CARDIOVASCULAR STRESS TEST  5/2011    COLONOSCOPY      7/2014    HYSTERECTOMY      MAMMO IMPLANT DIGITAL DIAG BI  06/2011    repeat in 12/2011    SHOULDER SURGERY      LEONILA AND BSO         Restrictions/Precautions:  Restrictions/Precautions: Fall Risk, General Precautions        Position Activity Restriction  Other position/activity restrictions: Confusion       Subjective:  Chart Reviewed: Yes  Patient assessed for rehabilitation services?: Yes  Subjective: Pt is expecting to be discharged home later in the day and agreed to therapy eval and to get some input for exercises she can do. General:  Overall Orientation Status: Within Functional Limits    Vision: Within Functional Limits    Hearing: Within functional limits         Pain:  Denies. Social/Functional History:    Lives With: Alone  Type of Home: House  Home Layout: Two level, Able to Live on Main level with bedroom/bathroom  Home Access: Level entry  Home Equipment: Quad cane     Bathroom Shower/Tub: Walk-in shower  Bathroom Toilet: Standard  Bathroom Equipment: Shower chair       ADL Assistance: Independent  Homemaking Assistance: Independent  Ambulation Assistance: Independent  Transfer Assistance: Independent    Active : No  Additional Comments: Pt reports use of quad cane PLOF. Indep with ADLs & homemaking as patient lives alone.      Objective:     RLE AROM: WNL     LLE AROM : WNL       Strength RLE: WFL  Comment: grossly 4/5 throughout    Strength LLE: WFL  Comment: grossly 4/5 throughout         Sensation  Overall Sensation Status: WNL       Supine to Sit: Supervision  Sit to Supine: Supervision    Transfers  Sit to Stand: Supervision  Stand to sit: Supervision       Ambulation 1  Surface: level tile  Device: Small True[employer]enson  Assistance: Stand by assistance  Quality of Gait: slower pace, no LOB, mild path deviations noted, grossly steady otherwise  Distance: 50 ft and additional distance below       Exercises:  Comments: Pt was guided through completion of seated marches, long arc quads, and ankle pumps. Each x 10 reps. Pt was also educated on supine exercises with understanding indicated. Activity Tolerance:  Activity Tolerance: Patient Tolerated treatment well    Treatment Initiated: See exercises above and pt ambulated 200 ft with quad cane and SBA with same deviations as above      Assessment: Body structures, Functions, Activity limitations: Decreased functional mobility , Decreased strength  Assessment: Pt is moving fairly well and was receptive of LE exercises for Home Exercise Plan. No further skilled PT necessary at this time. Prognosis: Excellent    Clinical Presentation: Low - Stable and Uncomplicated:      Decision Making: Moderate Complexity based on patient assessment and decision making process of determining plan of care and establishing reasonable expectations for measurable functional outcomes    REQUIRES PT FOLLOW UP: No  No Skilled PT: Safe to return home    Patient Education:  Patient Education: LE exercises and recommendations to keep mobility up. Equipment Recommendations:  Equipment Needed: No    Safety:  Type of devices: Call light within reach, Left in bed, Nurse notified    Plan:  Times per week: n/a    Goals:  Patient goals : go home  Short term goals  Time Frame for Short term goals: not set due to scheduled discharge this afternoon. Evaluation Complexity: Based on the findings of patient history, examination, clinical presentation, and decision making during this evaluation, the evaluation of Nicole Jennings  is of low complexity.     PT G-Codes  Functional Assessment Tool Used: prof judgement  Functional Limitation: Mobility: Walking and moving around  Mobility: Walking and Moving Around Current Status (): At least 1 percent but less than 20 percent impaired, limited or restricted  Mobility: Walking and Moving Around Goal Status (): At least 1 percent but less than 20 percent impaired, limited or restricted  Mobility: Walking and Moving Around Discharge Status (): At least 1 percent but less than 20 percent impaired, limited or restricted      Annette Montano.  Katie Chambers [address]

## 2017-09-02 ENCOUNTER — CARE COORDINATION (OUTPATIENT)
Dept: OTHER | Facility: CLINIC | Age: 62
End: 2017-09-02

## 2017-09-05 ENCOUNTER — TELEPHONE (OUTPATIENT)
Dept: PHARMACY | Facility: CLINIC | Age: 62
End: 2017-09-05

## 2017-09-05 ENCOUNTER — TELEPHONE (OUTPATIENT)
Dept: FAMILY MEDICINE CLINIC | Age: 62
End: 2017-09-05

## 2017-09-06 ENCOUNTER — OFFICE VISIT (OUTPATIENT)
Dept: FAMILY MEDICINE CLINIC | Age: 62
End: 2017-09-06
Payer: MEDICARE

## 2017-09-06 VITALS
TEMPERATURE: 97.6 F | RESPIRATION RATE: 12 BRPM | HEART RATE: 72 BPM | SYSTOLIC BLOOD PRESSURE: 126 MMHG | HEIGHT: 63 IN | BODY MASS INDEX: 30.65 KG/M2 | WEIGHT: 173 LBS | DIASTOLIC BLOOD PRESSURE: 70 MMHG

## 2017-09-06 DIAGNOSIS — R41.82 ALTERED MENTAL STATUS, UNSPECIFIED: ICD-10-CM

## 2017-09-06 DIAGNOSIS — F25.1 SCHIZOAFFECTIVE DISORDER, DEPRESSIVE TYPE (HCC): ICD-10-CM

## 2017-09-06 DIAGNOSIS — Z79.899 POLYPHARMACY: Primary | ICD-10-CM

## 2017-09-06 DIAGNOSIS — E03.9 HYPOTHYROIDISM, UNSPECIFIED TYPE: ICD-10-CM

## 2017-09-06 PROCEDURE — 99496 TRANSJ CARE MGMT HIGH F2F 7D: CPT | Performed by: NURSE PRACTITIONER

## 2017-09-06 RX ORDER — CLONAZEPAM 2 MG/1
TABLET ORAL
Qty: 60 TABLET | Refills: 0 | Status: ON HOLD | OUTPATIENT
Start: 2017-09-06 | End: 2018-09-26

## 2017-09-06 RX ORDER — BUPROPION HYDROCHLORIDE 300 MG/1
TABLET ORAL
Qty: 30 TABLET | Refills: 0 | Status: ON HOLD | OUTPATIENT
Start: 2017-09-06 | End: 2018-08-21 | Stop reason: HOSPADM

## 2017-09-06 RX ORDER — QUETIAPINE FUMARATE 300 MG/1
TABLET, FILM COATED ORAL
Qty: 60 TABLET | Refills: 0 | Status: ON HOLD | OUTPATIENT
Start: 2017-09-06 | End: 2018-08-21 | Stop reason: HOSPADM

## 2017-09-06 RX ORDER — LORAZEPAM 1 MG/1
TABLET ORAL
Qty: 60 TABLET | Refills: 0 | Status: ON HOLD | OUTPATIENT
Start: 2017-09-06 | End: 2018-08-21 | Stop reason: HOSPADM

## 2017-09-06 ASSESSMENT — PATIENT HEALTH QUESTIONNAIRE - PHQ9
1. LITTLE INTEREST OR PLEASURE IN DOING THINGS: 1
SUM OF ALL RESPONSES TO PHQ9 QUESTIONS 1 & 2: 2
2. FEELING DOWN, DEPRESSED OR HOPELESS: 1
SUM OF ALL RESPONSES TO PHQ QUESTIONS 1-9: 2

## 2017-09-08 ENCOUNTER — TELEPHONE (OUTPATIENT)
Dept: FAMILY MEDICINE CLINIC | Age: 62
End: 2017-09-08

## 2017-09-08 ENCOUNTER — CARE COORDINATION (OUTPATIENT)
Dept: CASE MANAGEMENT | Age: 62
End: 2017-09-08

## 2017-10-10 ENCOUNTER — HOSPITAL ENCOUNTER (EMERGENCY)
Age: 62
Discharge: HOME | End: 2017-10-10
Payer: MEDICARE

## 2017-10-10 VITALS
BODY MASS INDEX: 31.01 KG/M2 | HEART RATE: 88 BPM | TEMPERATURE: 97.8 F | HEIGHT: 63 IN | SYSTOLIC BLOOD PRESSURE: 101 MMHG | RESPIRATION RATE: 20 BRPM | DIASTOLIC BLOOD PRESSURE: 68 MMHG | WEIGHT: 175 LBS | OXYGEN SATURATION: 99 %

## 2017-10-10 DIAGNOSIS — Z76.0 ENCOUNTER FOR MEDICATION REFILL: Primary | ICD-10-CM

## 2017-10-10 PROCEDURE — 99281 EMR DPT VST MAYX REQ PHY/QHP: CPT

## 2017-10-10 ASSESSMENT — ENCOUNTER SYMPTOMS
ABDOMINAL PAIN: 0
SHORTNESS OF BREATH: 0
VOMITING: 0
NAUSEA: 0
BACK PAIN: 0
CHEST TIGHTNESS: 0
DIARRHEA: 0

## 2017-10-10 NOTE — ED PROVIDER NOTES
chronic; and Weight loss. SURGICAL HISTORY      has a past surgical history that includes shoulder surgery; jany and bso (cervix removed); cardiovascular stress test (5/2011); Colonoscopy; Appendectomy; Mammography digital diagnostic bilateral (06/2011); and Hysterectomy. CURRENT MEDICATIONS       Discharge Medication List as of 10/10/2017 12:40 PM      CONTINUE these medications which have NOT CHANGED    Details   QUEtiapine (SEROQUEL) 300 MG tablet TAKE TWO TABLETS BY MOUTH AT BEDTIME, Disp-60 tablet, R-0Normal      clonazePAM (KLONOPIN) 2 MG tablet TAKE TWO TABLETS BY MOUTH AT BEDTIME, Disp-60 tablet, R-0Normal      LORazepam (ATIVAN) 1 MG tablet TAKE ONE TABLET BY MOUTH TWICE DAILY AS NEEDED, Disp-60 tablet, R-0Normal      !! buPROPion (WELLBUTRIN XL) 300 MG extended release tablet TAKE ONE TABLET BY MOUTH DAILY IN THE MORNING, Disp-30 tablet, R-0Normal      omeprazole (PRILOSEC) 20 MG delayed release capsule Take 20 mg by mouth dailyHistorical Med      levothyroxine (SYNTHROID) 50 MCG tablet Take 50 mcg by mouth DailyHistorical Med      !! buPROPion (WELLBUTRIN XL) 300 MG extended release tablet TAKE ONE TABLET BY MOUTH DAILY IN THE MORNING, Disp-30 tablet, R-0Normal      amphetamine-dextroamphetamine (ADDERALL) 20 MG tablet TAKE ONE TABLET BY MOUTH TWICE DAILY. , Disp-60 tablet, R-0Normal      Misc.  Devices [facility]'S \[facility] [place] [facility]\"") MISC Disp-1 each, R-0, PrintDispense 1 Power Mobility Device      gabapentin (NEURONTIN) 800 MG tablet Take 1 tablet by mouth 4 times daily, Disp-270 tablet, R-3Normal      tiZANidine (ZANAFLEX) 4 MG tablet Take 1 tablet by mouth 3 times daily, Disp-180 tablet, R-3      metoprolol tartrate (LOPRESSOR) 25 MG tablet Take 0.5 tablets by mouth 2 times daily, Disp-90 tablet, R-3      ranitidine (ZANTAC) 150 MG tablet Take 1 tablet by mouth nightly, Disp-90 tablet, R-3      galantamine (RAZADYNE) 8 MG tablet Take 1 tablet by mouth 2 times daily, Disp-180 tablet, R-3      Calcium Medication refill    DIAGNOSTIC RESULTS     EKG: All EKG's are interpreted by the Emergency Department Physician who either signs or Co-signs this chart in the absence of a cardiologist.  None    RADIOLOGY: non-plain film images(s) such as CT, Ultrasound and MRI are read by the radiologist.  Plain radiographic images are visualized and preliminarily interpreted by the emergency physician unless otherwise stated below. No orders to display       LABS:   No results found for this visit on 10/10/17. EMERGENCY DEPARTMENT COURSE:   Vitals:    Vitals:    10/10/17 1222   BP: 101/68   Pulse: 88   Resp: 20   Temp: 97.8 °F (36.6 °C)   SpO2: 99%   Weight: 175 lb (79.4 kg)   Height: 5' 3\" (1.6 m)       MDM  Pt here for medication refill. Advised her that we do not refill meds. KATELYN spoke with Melvi and pt is to be seen for intake assessment in 2 days. Ptunderstood the plan and was agreeable. The patient was given typical anticipatory guidance including indications for returning to the emergency department. All questions answered. Medications Given in the ED  Medications - No data to display      CRITICAL CARE[de-identified]   NONE    CONSULTS:  KATELYN    PROCEDURES:  None    FINAL IMPRESSION      1. Encounter for medication refill          DISPOSITION/PLAN   Decision To Discharge    PATIENT REFERRED TO:  [facility]  [address] [facility] [address]  [phone]    Go in 2 days        DISCHARGE MEDICATIONS:  Discharge Medication List as of 10/10/2017 12:40 PM          (Please note that portions of this note were completed with a voice recognition program.  Efforts were made to edit the dictations but occasionally words are mis-transcribed.)    Scribe:  Satish Martines, 10/10/17 6:24 PM Scribing for and in the presence of Jenniffer Daily PA-C.     Signed by: Yahir Antonio, 10/10/17 6:24 PM     Provider:  I personally performed the services described in the documentation, reviewed and edited the documentation which was dictated to the scribe in my presence, and it accurately records my words and actions.     Courtney Frederick PA-C 10/10/17 6:24 PM      DANILO Cortez [place]  10/10/17 1554

## 2018-08-18 ENCOUNTER — HOSPITAL ENCOUNTER (INPATIENT)
Age: 63
LOS: 3 days | Discharge: HOME | DRG: 885 | End: 2018-08-21
Attending: PSYCHIATRY & NEUROLOGY | Admitting: PSYCHIATRY & NEUROLOGY
Payer: MEDICARE

## 2018-08-18 DIAGNOSIS — F41.9 ANXIETY: Primary | ICD-10-CM

## 2018-08-18 DIAGNOSIS — F25.1 SCHIZOAFFECTIVE DISORDER, DEPRESSIVE TYPE (HCC): ICD-10-CM

## 2018-08-18 PROBLEM — F33.9 DEPRESSION, MAJOR, RECURRENT (HCC): Status: ACTIVE | Noted: 2018-08-18

## 2018-08-18 LAB
ACETAMINOPHEN LEVEL: < 5 UG/ML (ref 0–20)
ALBUMIN SERPL-MCNC: 4.2 G/DL (ref 3.5–5.1)
ALP BLD-CCNC: 120 U/L (ref 38–126)
ALT SERPL-CCNC: 16 U/L (ref 11–66)
AMPHETAMINE+METHAMPHETAMINE URINE SCREEN: NEGATIVE
ANION GAP SERPL CALCULATED.3IONS-SCNC: 15 MEQ/L (ref 8–16)
AST SERPL-CCNC: 18 U/L (ref 5–40)
BARBITURATE QUANTITATIVE URINE: NEGATIVE
BASOPHILS # BLD: 1.1 %
BASOPHILS ABSOLUTE: 0.1 THOU/MM3 (ref 0–0.1)
BENZODIAZEPINE QUANTITATIVE URINE: NEGATIVE
BILIRUB SERPL-MCNC: 0.2 MG/DL (ref 0.3–1.2)
BILIRUBIN DIRECT: < 0.2 MG/DL (ref 0–0.3)
BILIRUBIN URINE: NEGATIVE
BLOOD, URINE: NEGATIVE
BUN BLDV-MCNC: 16 MG/DL (ref 7–22)
CALCIUM SERPL-MCNC: 10.2 MG/DL (ref 8.5–10.5)
CANNABINOID QUANTITATIVE URINE: POSITIVE
CHARACTER, URINE: CLEAR
CHLORIDE BLD-SCNC: 100 MEQ/L (ref 98–111)
CO2: 23 MEQ/L (ref 23–33)
COCAINE METABOLITE QUANTITATIVE URINE: NEGATIVE
COLOR: YELLOW
CREAT SERPL-MCNC: 0.7 MG/DL (ref 0.4–1.2)
EOSINOPHIL # BLD: 4.1 %
EOSINOPHILS ABSOLUTE: 0.5 THOU/MM3 (ref 0–0.4)
ERYTHROCYTE [DISTWIDTH] IN BLOOD BY AUTOMATED COUNT: 16.4 % (ref 11.5–14.5)
ERYTHROCYTE [DISTWIDTH] IN BLOOD BY AUTOMATED COUNT: 51.4 FL (ref 35–45)
ETHYL ALCOHOL, SERUM: < 0.01 %
GFR SERPL CREATININE-BSD FRML MDRD: 85 ML/MIN/1.73M2
GLUCOSE BLD-MCNC: 110 MG/DL (ref 70–108)
GLUCOSE URINE: NEGATIVE MG/DL
HCT VFR BLD CALC: 44.1 % (ref 37–47)
HEMOGLOBIN: 14.4 GM/DL (ref 12–16)
IMMATURE GRANS (ABS): 0.06 THOU/MM3 (ref 0–0.07)
IMMATURE GRANULOCYTES: 0.5 %
KETONES, URINE: NEGATIVE
LEUKOCYTE ESTERASE, URINE: NEGATIVE
LYMPHOCYTES # BLD: 18.3 %
LYMPHOCYTES ABSOLUTE: 2 THOU/MM3 (ref 1–4.8)
MCH RBC QN AUTO: 28.5 PG (ref 26–33)
MCHC RBC AUTO-ENTMCNC: 32.7 GM/DL (ref 32.2–35.5)
MCV RBC AUTO: 87.2 FL (ref 81–99)
MONOCYTES # BLD: 8.7 %
MONOCYTES ABSOLUTE: 1 THOU/MM3 (ref 0.4–1.3)
NITRITE, URINE: NEGATIVE
NUCLEATED RED BLOOD CELLS: 0 /100 WBC
OPIATES, URINE: NEGATIVE
OSMOLALITY CALCULATION: 277.5 MOSMOL/KG (ref 275–300)
OXYCODONE: NEGATIVE
PH UA: 6
PHENCYCLIDINE QUANTITATIVE URINE: NEGATIVE
PLATELET # BLD: 437 THOU/MM3 (ref 130–400)
PMV BLD AUTO: 10.3 FL (ref 9.4–12.4)
POTASSIUM SERPL-SCNC: 4.6 MEQ/L (ref 3.5–5.2)
PROTEIN UA: NEGATIVE
RBC # BLD: 5.06 MILL/MM3 (ref 4.2–5.4)
SALICYLATE, SERUM: < 0.3 MG/DL (ref 2–10)
SEG NEUTROPHILS: 67.3 %
SEGMENTED NEUTROPHILS ABSOLUTE COUNT: 7.5 THOU/MM3 (ref 1.8–7.7)
SODIUM BLD-SCNC: 138 MEQ/L (ref 135–145)
SPECIFIC GRAVITY, URINE: 1.01 (ref 1–1.03)
TOTAL PROTEIN: 7.4 G/DL (ref 6.1–8)
TSH SERPL DL<=0.05 MIU/L-ACNC: 3.57 UIU/ML (ref 0.4–4.2)
UROBILINOGEN, URINE: 0.2 EU/DL
WBC # BLD: 11.1 THOU/MM3 (ref 4.8–10.8)

## 2018-08-18 PROCEDURE — 6360000002 HC RX W HCPCS: Performed by: NURSE PRACTITIONER

## 2018-08-18 PROCEDURE — 80053 COMPREHEN METABOLIC PANEL: CPT

## 2018-08-18 PROCEDURE — 84443 ASSAY THYROID STIM HORMONE: CPT

## 2018-08-18 PROCEDURE — 6370000000 HC RX 637 (ALT 250 FOR IP): Performed by: PSYCHIATRY & NEUROLOGY

## 2018-08-18 PROCEDURE — 6370000000 HC RX 637 (ALT 250 FOR IP): Performed by: NURSE PRACTITIONER

## 2018-08-18 PROCEDURE — 82248 BILIRUBIN DIRECT: CPT

## 2018-08-18 PROCEDURE — 99284 EMERGENCY DEPT VISIT MOD MDM: CPT

## 2018-08-18 PROCEDURE — 96372 THER/PROPH/DIAG INJ SC/IM: CPT

## 2018-08-18 PROCEDURE — 1240000000 HC EMOTIONAL WELLNESS R&B

## 2018-08-18 PROCEDURE — 80307 DRUG TEST PRSMV CHEM ANLYZR: CPT

## 2018-08-18 PROCEDURE — 99223 1ST HOSP IP/OBS HIGH 75: CPT | Performed by: PSYCHIATRY & NEUROLOGY

## 2018-08-18 PROCEDURE — G0480 DRUG TEST DEF 1-7 CLASSES: HCPCS

## 2018-08-18 PROCEDURE — 81003 URINALYSIS AUTO W/O SCOPE: CPT

## 2018-08-18 PROCEDURE — 36415 COLL VENOUS BLD VENIPUNCTURE: CPT

## 2018-08-18 PROCEDURE — 85025 COMPLETE CBC W/AUTO DIFF WBC: CPT

## 2018-08-18 RX ORDER — LORAZEPAM 2 MG/ML
INJECTION INTRAMUSCULAR
Status: DISPENSED
Start: 2018-08-18 | End: 2018-08-18

## 2018-08-18 RX ORDER — TRAZODONE HYDROCHLORIDE 50 MG/1
50 TABLET ORAL NIGHTLY PRN
Status: DISCONTINUED | OUTPATIENT
Start: 2018-08-18 | End: 2018-08-21 | Stop reason: HOSPADM

## 2018-08-18 RX ORDER — MAGNESIUM HYDROXIDE/ALUMINUM HYDROXICE/SIMETHICONE 120; 1200; 1200 MG/30ML; MG/30ML; MG/30ML
30 SUSPENSION ORAL PRN
Status: DISCONTINUED | OUTPATIENT
Start: 2018-08-18 | End: 2018-08-21 | Stop reason: HOSPADM

## 2018-08-18 RX ORDER — FLUOXETINE HYDROCHLORIDE 20 MG/1
40 CAPSULE ORAL DAILY
Status: ON HOLD | COMMUNITY
End: 2018-08-21 | Stop reason: HOSPADM

## 2018-08-18 RX ORDER — HYDROXYZINE PAMOATE 25 MG/1
25 CAPSULE ORAL 3 TIMES DAILY PRN
Status: DISCONTINUED | OUTPATIENT
Start: 2018-08-18 | End: 2018-08-18

## 2018-08-18 RX ORDER — ACETAMINOPHEN 325 MG/1
650 TABLET ORAL EVERY 4 HOURS PRN
Status: DISCONTINUED | OUTPATIENT
Start: 2018-08-18 | End: 2018-08-21 | Stop reason: HOSPADM

## 2018-08-18 RX ORDER — NICOTINE 21 MG/24HR
1 PATCH, TRANSDERMAL 24 HOURS TRANSDERMAL ONCE
Status: COMPLETED | OUTPATIENT
Start: 2018-08-18 | End: 2018-08-19

## 2018-08-18 RX ORDER — HYDROXYZINE PAMOATE 25 MG/1
25 CAPSULE ORAL 3 TIMES DAILY
Status: DISCONTINUED | OUTPATIENT
Start: 2018-08-18 | End: 2018-08-21 | Stop reason: HOSPADM

## 2018-08-18 RX ORDER — QUETIAPINE FUMARATE 100 MG/1
100 TABLET, FILM COATED ORAL NIGHTLY
Status: DISCONTINUED | OUTPATIENT
Start: 2018-08-18 | End: 2018-08-20 | Stop reason: ALTCHOICE

## 2018-08-18 RX ORDER — LORAZEPAM 2 MG/ML
2 INJECTION INTRAMUSCULAR ONCE
Status: COMPLETED | OUTPATIENT
Start: 2018-08-18 | End: 2018-08-18

## 2018-08-18 RX ORDER — BUPROPION HYDROCHLORIDE 150 MG/1
150 TABLET ORAL EVERY MORNING
Status: ON HOLD | COMMUNITY
End: 2018-08-21 | Stop reason: HOSPADM

## 2018-08-18 RX ADMIN — QUETIAPINE FUMARATE 100 MG: 100 TABLET ORAL at 21:06

## 2018-08-18 RX ADMIN — HYDROXYZINE PAMOATE 25 MG: 25 CAPSULE ORAL at 21:06

## 2018-08-18 RX ADMIN — SERTRALINE 50 MG: 50 TABLET, FILM COATED ORAL at 17:09

## 2018-08-18 RX ADMIN — TRAZODONE HYDROCHLORIDE 50 MG: 50 TABLET ORAL at 21:06

## 2018-08-18 RX ADMIN — LORAZEPAM 2 MG: 2 INJECTION, SOLUTION INTRAMUSCULAR; INTRAVENOUS at 05:31

## 2018-08-18 RX ADMIN — HYDROXYZINE PAMOATE 25 MG: 25 CAPSULE ORAL at 17:09

## 2018-08-18 ASSESSMENT — PAIN DESCRIPTION - LOCATION
LOCATION: GENERALIZED
LOCATION: LEG

## 2018-08-18 ASSESSMENT — ENCOUNTER SYMPTOMS
SHORTNESS OF BREATH: 0
BACK PAIN: 0
DIARRHEA: 0
SORE THROAT: 0
VOMITING: 0
RHINORRHEA: 0
COUGH: 0
NAUSEA: 0
ABDOMINAL PAIN: 0
EYE PAIN: 0
EYE DISCHARGE: 0
WHEEZING: 0

## 2018-08-18 ASSESSMENT — PAIN SCALES - GENERAL
PAINLEVEL_OUTOF10: 4
PAINLEVEL_OUTOF10: 10

## 2018-08-18 ASSESSMENT — PAIN DESCRIPTION - DESCRIPTORS
DESCRIPTORS: ACHING
DESCRIPTORS: ACHING

## 2018-08-18 ASSESSMENT — SLEEP AND FATIGUE QUESTIONNAIRES
AVERAGE NUMBER OF SLEEP HOURS: 3
DIFFICULTY ARISING: YES
DIFFICULTY FALLING ASLEEP: YES
SLEEP PATTERN: DIFFICULTY FALLING ASLEEP;RESTLESSNESS
DO YOU HAVE DIFFICULTY SLEEPING: YES
DIFFICULTY STAYING ASLEEP: YES
DO YOU USE A SLEEP AID: NO
RESTFUL SLEEP: NO

## 2018-08-18 ASSESSMENT — PATIENT HEALTH QUESTIONNAIRE - PHQ9
SUM OF ALL RESPONSES TO PHQ QUESTIONS 1-9: 27
SUM OF ALL RESPONSES TO PHQ QUESTIONS 1-9: 27

## 2018-08-18 ASSESSMENT — LIFESTYLE VARIABLES: HISTORY_ALCOHOL_USE: YES

## 2018-08-18 ASSESSMENT — PAIN DESCRIPTION - FREQUENCY
FREQUENCY: CONTINUOUS
FREQUENCY: CONTINUOUS

## 2018-08-18 ASSESSMENT — PAIN DESCRIPTION - PAIN TYPE
TYPE: CHRONIC PAIN
TYPE: ACUTE PAIN

## 2018-08-18 NOTE — BH NOTE
Pt resting in room during discussion of daily goals in the day room. Pt asleep and no daily goal was obtained.

## 2018-08-18 NOTE — ED PROVIDER NOTES
Addendum:  Care was assumed at 6 AM.  Patient was medically cleared by the previous provider. Patient is [facility] for suicidal ideation.   Pt will be transferred to access hospital.       DANILO Aguiar  08/18/18 6668

## 2018-08-18 NOTE — BH NOTE
Group Therapy Note    Date: 8/18/2018  Start Time: 1630  End Time:  1700  Number of Participants: 12    Type of Group: Healthy Living/Wellness    Notes:  Patient attended group with good participation    Status After Intervention:  Improved    Participation Level:  Active Listener and Interactive    Participation Quality: Appropriate, Attentive, Sharing and Supportive    Speech:  normal    Thought Process/Content: Logical  Linear    Affective Functioning: Blunted and Flat    Mood: euthymic    Level of consciousness:  Alert, Oriented x4 and Attentive    Response to Learning: Able to verbalize current knowledge/experience, Able to verbalize/acknowledge new learning, Able to retain information, Capable of insight, Able to change behavior and Progressing to goal    Endings: None Reported    Modes of Intervention: Problem-solving and Activity    Discipline Responsible: Licensed Practical Nurse    Signature:  Latesha Moody LPN

## 2018-08-18 NOTE — ED NOTES
Patient was moved to [facility] and patient became anxious and was refusing to enter safe room stating \"I was in those last time and couldn't breath, I won't be able to leave! \" Patient was verbally assured that it was a temporary placement and that she would be able to talk to the  to figure out next steps. Patient agreed to enter safe room if anxiety medication was given, verbal order obtained from [facility].  Patient medicated and ambulated into safe room bathroom to change into psych clothing, patient's belongings placed in locked locker, and patient provided with slippers, drink, and snack per request.       Siomara Newton RN  08/18/18 3803

## 2018-08-18 NOTE — ED NOTES
Pt admitted to hospital. Transported to floor by cart in stable condition.      Dicie Claude List, LPN  51/48/76 [id number]
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
## 2018-08-19 PROCEDURE — 99231 SBSQ HOSP IP/OBS SF/LOW 25: CPT | Performed by: NURSE PRACTITIONER

## 2018-08-19 PROCEDURE — 1240000000 HC EMOTIONAL WELLNESS R&B

## 2018-08-19 PROCEDURE — 6370000000 HC RX 637 (ALT 250 FOR IP): Performed by: PSYCHIATRY & NEUROLOGY

## 2018-08-19 RX ORDER — NICOTINE 21 MG/24HR
1 PATCH, TRANSDERMAL 24 HOURS TRANSDERMAL DAILY
Status: DISCONTINUED | OUTPATIENT
Start: 2018-08-19 | End: 2018-08-21 | Stop reason: HOSPADM

## 2018-08-19 RX ADMIN — SERTRALINE 50 MG: 50 TABLET, FILM COATED ORAL at 08:46

## 2018-08-19 RX ADMIN — QUETIAPINE FUMARATE 100 MG: 100 TABLET ORAL at 20:59

## 2018-08-19 RX ADMIN — HYDROXYZINE PAMOATE 25 MG: 25 CAPSULE ORAL at 21:00

## 2018-08-19 RX ADMIN — HYDROXYZINE PAMOATE 25 MG: 25 CAPSULE ORAL at 08:45

## 2018-08-19 RX ADMIN — HYDROXYZINE PAMOATE 25 MG: 25 CAPSULE ORAL at 13:47

## 2018-08-19 RX ADMIN — TRAZODONE HYDROCHLORIDE 50 MG: 50 TABLET ORAL at 20:59

## 2018-08-19 ASSESSMENT — PAIN SCALES - GENERAL: PAINLEVEL_OUTOF10: 0

## 2018-08-20 PROCEDURE — 1240000000 HC EMOTIONAL WELLNESS R&B

## 2018-08-20 PROCEDURE — 99232 SBSQ HOSP IP/OBS MODERATE 35: CPT | Performed by: NURSE PRACTITIONER

## 2018-08-20 PROCEDURE — 6370000000 HC RX 637 (ALT 250 FOR IP): Performed by: PSYCHIATRY & NEUROLOGY

## 2018-08-20 RX ORDER — QUETIAPINE FUMARATE 100 MG/1
200 TABLET, FILM COATED ORAL NIGHTLY
Status: DISCONTINUED | OUTPATIENT
Start: 2018-08-21 | End: 2018-08-21 | Stop reason: HOSPADM

## 2018-08-20 RX ORDER — QUETIAPINE FUMARATE 100 MG/1
100 TABLET, FILM COATED ORAL ONCE
Status: COMPLETED | OUTPATIENT
Start: 2018-08-20 | End: 2018-08-21

## 2018-08-20 RX ORDER — QUETIAPINE FUMARATE 100 MG/1
100 TABLET, FILM COATED ORAL ONCE
Status: COMPLETED | OUTPATIENT
Start: 2018-08-20 | End: 2018-08-20

## 2018-08-20 RX ADMIN — QUETIAPINE FUMARATE 100 MG: 100 TABLET ORAL at 00:45

## 2018-08-20 RX ADMIN — TRAZODONE HYDROCHLORIDE 50 MG: 50 TABLET ORAL at 21:15

## 2018-08-20 RX ADMIN — HYDROXYZINE PAMOATE 25 MG: 25 CAPSULE ORAL at 12:49

## 2018-08-20 RX ADMIN — HYDROXYZINE PAMOATE 25 MG: 25 CAPSULE ORAL at 21:15

## 2018-08-20 RX ADMIN — QUETIAPINE FUMARATE 100 MG: 100 TABLET ORAL at 21:14

## 2018-08-20 RX ADMIN — HYDROXYZINE PAMOATE 25 MG: 25 CAPSULE ORAL at 09:27

## 2018-08-20 RX ADMIN — SERTRALINE 50 MG: 50 TABLET, FILM COATED ORAL at 09:27

## 2018-08-20 RX ADMIN — ACETAMINOPHEN 650 MG: 325 TABLET ORAL at 20:00

## 2018-08-20 ASSESSMENT — PAIN DESCRIPTION - FREQUENCY: FREQUENCY: CONTINUOUS

## 2018-08-20 ASSESSMENT — PAIN SCALES - GENERAL
PAINLEVEL_OUTOF10: 5
PAINLEVEL_OUTOF10: 0
PAINLEVEL_OUTOF10: 5

## 2018-08-20 ASSESSMENT — PAIN DESCRIPTION - PAIN TYPE: TYPE: CHRONIC PAIN

## 2018-08-20 ASSESSMENT — PAIN DESCRIPTION - DESCRIPTORS: DESCRIPTORS: ACHING

## 2018-08-20 ASSESSMENT — PAIN DESCRIPTION - LOCATION: LOCATION: BACK

## 2018-08-20 NOTE — BH NOTE
PLAN OF CARE:     Start Time: 0900  End Time:   0930    Group Topic:  Daily Goals/Unit Guidelines    Group Type:   Goal Group/Community Meeting    Intervention/Goal:  To increase support and identify daily goals    Attendance:  Attended group    Affect:   Brightens with interaction    Behavior:  Cooperative and pleasant    Response:    appropriate    Daily Goal:    Ask the doctor if I can go home today.      Progress:   Progressing to goal

## 2018-08-20 NOTE — BH NOTE
Group Therapy Note    Date: 8/19/2018  Start Time: 2000  End Time:  2020    Type of Group: Wrap-Up and relaxation    Patient's Goal:  To go to groups    Notes:  met    Status After Intervention:  Unchanged    Participation Level:  Active Listener    Participation Quality: Attentive      Speech:  normal      Thought Process/Content: Logical      Affective Functioning: Blunted      Mood: depressed      Level of consciousness:  Alert      Response to Learning: Able to verbalize current knowledge/experience      Endings: None Reported    Modes of Intervention: Socialization      Discipline Responsible: Registered Nurse      Signature:  Lesvia Alicea RN

## 2018-08-21 VITALS
TEMPERATURE: 96.3 F | DIASTOLIC BLOOD PRESSURE: 66 MMHG | SYSTOLIC BLOOD PRESSURE: 124 MMHG | HEIGHT: 64 IN | OXYGEN SATURATION: 99 % | RESPIRATION RATE: 18 BRPM | HEART RATE: 97 BPM | BODY MASS INDEX: 28.85 KG/M2 | WEIGHT: 169 LBS

## 2018-08-21 PROCEDURE — 99238 HOSP IP/OBS DSCHRG MGMT 30/<: CPT | Performed by: PSYCHIATRY & NEUROLOGY

## 2018-08-21 PROCEDURE — 6370000000 HC RX 637 (ALT 250 FOR IP): Performed by: PSYCHIATRY & NEUROLOGY

## 2018-08-21 PROCEDURE — 5130000000 HC BRIDGE APPOINTMENT

## 2018-08-21 RX ORDER — QUETIAPINE FUMARATE 200 MG/1
200 TABLET, FILM COATED ORAL NIGHTLY
Qty: 60 TABLET | Refills: 1 | Status: ON HOLD | OUTPATIENT
Start: 2018-08-21 | End: 2018-09-29 | Stop reason: HOSPADM

## 2018-08-21 RX ORDER — TRAZODONE HYDROCHLORIDE 50 MG/1
50 TABLET ORAL NIGHTLY PRN
Qty: 30 TABLET | Refills: 0 | Status: ON HOLD | OUTPATIENT
Start: 2018-08-21 | End: 2018-10-13 | Stop reason: HOSPADM

## 2018-08-21 RX ORDER — LEVOTHYROXINE SODIUM 0.05 MG/1
50 TABLET ORAL DAILY
Qty: 30 TABLET | Refills: 1 | Status: SHIPPED | OUTPATIENT
Start: 2018-08-21 | End: 2022-06-27

## 2018-08-21 RX ORDER — OMEPRAZOLE 20 MG/1
20 CAPSULE, DELAYED RELEASE ORAL DAILY
Qty: 30 CAPSULE | Refills: 1 | Status: SHIPPED | OUTPATIENT
Start: 2018-08-21 | End: 2022-05-03

## 2018-08-21 RX ORDER — HYDROXYZINE PAMOATE 25 MG/1
25 CAPSULE ORAL 3 TIMES DAILY
Qty: 42 CAPSULE | Refills: 0 | Status: SHIPPED | OUTPATIENT
Start: 2018-08-21 | End: 2018-09-04

## 2018-08-21 RX ADMIN — QUETIAPINE FUMARATE 100 MG: 100 TABLET ORAL at 00:05

## 2018-08-21 RX ADMIN — HYDROXYZINE PAMOATE 25 MG: 25 CAPSULE ORAL at 13:39

## 2018-08-21 RX ADMIN — HYDROXYZINE PAMOATE 25 MG: 25 CAPSULE ORAL at 08:33

## 2018-08-21 RX ADMIN — SERTRALINE 50 MG: 50 TABLET, FILM COATED ORAL at 08:28

## 2018-08-21 ASSESSMENT — PAIN SCALES - GENERAL: PAINLEVEL_OUTOF10: 0
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
## 2018-08-21 NOTE — PLAN OF CARE
Problem: Discharge Planning:  Goal: Discharged to appropriate level of care  Discharged to appropriate level of care   James Richardson is scheduled for a follow up appointment with Cherri Oscar on 08/23/18 at 4:30 PM with [facility].
Problem: Falls - Risk of:  Goal: Will remain free from falls  Will remain free from falls   Outcome: Met This Shift  Patient is free from falls this shift. Goal: Absence of physical injury  Absence of physical injury   Outcome: Met This Shift  Free from physical injury. Problem: Pain:  Goal: Pain level will decrease  Pain level will decrease   Outcome: Ongoing  Reports pain #5/10 in her back. PRN medication administered. Refer to [facility] Problem: Knowledge Deficit  Goal: Knowledge - personal safety  Outcome: Ongoing  Encouraged to complete safety plan before discharge. Problem: Discharge Planning:  Goal: Discharged to appropriate level of care  Discharged to appropriate level of care   Outcome: Ongoing  Working with multidisciplinary treatment team for ongoing continuity of care and relapse prevention. Problem: Altered Mood, Depressive Behavior:  Goal: Able to verbalize acceptance of life and situations over which he or she has no control  Able to verbalize acceptance of life and situations over which he or she has no control   Outcome: Ongoing  Able to verbalize and/or display a decrease in depressive symptoms  Goal: Able to verbalize and/or display a decrease in depressive symptoms  Able to verbalize and/or display a decrease in depressive symptoms   Outcome: Met This Shift  Patient denies feeling depressed. Goal: Patient specific goal  Patient specific goal   Outcome: Ongoing  Patient is working toward her goal.  Goal: Participates in care planning  Participates in care planning   Outcome: Ongoing  Will continue to encourage participating in care planning. Problem:  Activity:  Goal: Sleeping patterns will improve  Sleeping patterns will improve   Outcome: Ongoing  Slept only 5 hours of continuous sleep last pm.
Problem: Falls - Risk of:  Goal: Will remain free from falls  Will remain free from falls   Outcome: Met This Shift  Remained free from falls this shift. Goal: Absence of physical injury  Absence of physical injury   Outcome: Met This Shift  Pt remains safe and free from harm. Problem: Pain:  Goal: Pain level will decrease  Pain level will decrease   Outcome: Met This Shift  Denies pain this shift. Goal: Control of acute pain  Control of acute pain   Outcome: Completed Date Met: 08/19/18    Goal: Control of chronic pain  Control of chronic pain   Outcome: Completed Date Met: 08/19/18      Problem: Knowledge Deficit  Goal: Knowledge - personal safety  Outcome: Not Met This Shift  Safety plan not completed this shift. Problem: Discharge Planning:  Goal: Discharged to appropriate level of care  Discharged to appropriate level of care   Outcome: Ongoing  Discharge planning in progress. Problem: Altered Mood, Depressive Behavior:  Goal: Able to verbalize acceptance of life and situations over which he or she has no control  Able to verbalize acceptance of life and situations over which he or she has no control   Outcome: Not Met This Shift  Does not verbalize acceptance   Goal: Able to verbalize and/or display a decrease in depressive symptoms  Able to verbalize and/or display a decrease in depressive symptoms   Outcome: Not Met This Shift  Continues to be depressed rates her mood a 4-5 out of 10 with 10 being the best. Affect flat and sad. Good eye contact. On fringe with peers. States she is hopeful for the future. Denies having any support. Shower this shift. Goal: Patient specific goal  Patient specific goal   Outcome: Not Met This Shift  Did not make goal today   Goal: Participates in care planning  Participates in care planning   Outcome: Met This Shift  Care plan reviewed with patient. Patient does verbalize understanding of the plan of care and does not contribute to goal setting.       Problem:
Problem: Social interaction  Goal: Increased social interaction  Outcome: Met This Shift  Pt has attended all group therapy sessions offered thus far today. Pt has been participating appropriately in group with prompting to engage in peers and tasks. Pt has been seen out on the unit outside of group therapy interacting with peers and participating in recreational activity. Pt will continue to be encouraged to attend group therapy sessions and participate appropriately. Pt will continue to be encouraged to socialize with peers on the unit outside of group therapy sessions.  Pt will continue to progress toward social interaction goal.
Problem: Social interaction  Goal: Increased social interaction  Outcome: Not Met This Shift  Pt has not been seen socializing with peers on the unit throughout the day.  Pt will continue to be encouraged to progress toward social interaction goal.
verbalized understanding. Comments: Care plan reviewed with patient.   Patient does verbalize understanding of the plan of care and does contribute to goal setting

## 2018-08-21 NOTE — DISCHARGE SUMMARY
lumbar; MS (multiple sclerosis) (HCC)      gabapentin (NEURONTIN) 800 MG tablet Take 1 tablet by mouth 4 times daily  Qty: 270 tablet, Refills: 3    Associated Diagnoses: DDD (degenerative disc disease), lumbar; Chronic bilateral low back pain with bilateral sciatica      tiZANidine (ZANAFLEX) 4 MG tablet Take 1 tablet by mouth 3 times daily  Qty: 180 tablet, Refills: 3    Associated Diagnoses: DDD (degenerative disc disease), lumbar; Chronic bilateral low back pain without sciatica      galantamine (RAZADYNE) 8 MG tablet Take 1 tablet by mouth 2 times daily  Qty: 180 tablet, Refills: 3      Calcium Carb-Cholecalciferol (CALCIUM + D3) 600-200 MG-UNIT TABS tablet Take 1 tablet by mouth 2 times daily  Qty: 180 tablet, Refills: 3      aspirin (ASPIRIN LOW DOSE) 81 MG EC tablet take 1 tablet once daily  Qty: 90 tablet, Refills: 3         STOP taking these medications       FLUoxetine (PROZAC) 20 MG capsule Comments:   Reason for Stopping:         buPROPion (WELLBUTRIN XL) 150 MG extended release tablet Comments:   Reason for Stopping:         LORazepam (ATIVAN) 1 MG tablet Comments:   Reason for Stopping:         buPROPion (WELLBUTRIN XL) 300 MG extended release tablet Comments:   Reason for Stopping:         buPROPion (WELLBUTRIN XL) 300 MG extended release tablet Comments:   Reason for Stopping:         amphetamine-dextroamphetamine (ADDERALL) 20 MG tablet Comments:   Reason for Stopping:         ranitidine (ZANTAC) 150 MG tablet Comments:   Reason for Stopping:              Discharge Exam:  Level of consciousness:  Within normal limits  Appearance: Street clothes, seated, with good grooming  Behavior/Motor: No abnormalities noted  Attitude toward examiner:  Cooperative, attentive, good eye contact  Speech:  spontaneous, normal rate, normal volume and well articulated  Mood:  I feel good   Affect:  mood congruent  Thought processes:  linear, goal directed and coherent  Thought content:  Homocidal ideation denies  Suicidal Ideation:  denies suicidal ideation  Delusions:  no evidence of delusions  Perceptual Disturbance:  denies any perceptual disturbance  Cognition:  In tact  Memory: age appropriate  Insight & Judgement: fair  Medication side effects:  denies     Disposition: home     Patient Instructions:     1. Advised to comply with medications as prescribed  2. Folow up with community provider    Follow-up as scheduled with [facility]     Time Spent: 20 minutes    Engagement: Patient displayed a good level of engagement with the treatments offered during this admission.      Signed: Electronically signed by Clarence Krabbe, MD on 8/21/2018 at 7:13 AM

## 2018-08-22 ENCOUNTER — TELEPHONE (OUTPATIENT)
Dept: ADMINISTRATIVE | Age: 63
End: 2018-08-22

## 2018-09-26 ENCOUNTER — HOSPITAL ENCOUNTER (INPATIENT)
Age: 63
LOS: 3 days | Discharge: HOME | DRG: 885 | End: 2018-09-29
Attending: FAMILY MEDICINE | Admitting: PSYCHIATRY & NEUROLOGY
Payer: MEDICARE

## 2018-09-26 DIAGNOSIS — R44.3 HALLUCINATIONS: Primary | ICD-10-CM

## 2018-09-26 DIAGNOSIS — F32.3 CURRENT SEVERE EPISODE OF MAJOR DEPRESSIVE DISORDER WITH PSYCHOTIC FEATURES, UNSPECIFIED WHETHER RECURRENT (HCC): ICD-10-CM

## 2018-09-26 PROBLEM — F25.9 SCHIZOAFFECTIVE DISORDER (HCC): Status: ACTIVE | Noted: 2018-09-26

## 2018-09-26 LAB
ACETAMINOPHEN LEVEL: < 5 UG/ML (ref 0–20)
ALBUMIN SERPL-MCNC: 4.7 G/DL (ref 3.5–5.1)
ALP BLD-CCNC: 119 U/L (ref 38–126)
ALT SERPL-CCNC: 11 U/L (ref 11–66)
AMMONIA: 34 UMOL/L (ref 11–60)
AMPHETAMINE+METHAMPHETAMINE URINE SCREEN: NEGATIVE
ANION GAP SERPL CALCULATED.3IONS-SCNC: 14 MEQ/L (ref 8–16)
AST SERPL-CCNC: 16 U/L (ref 5–40)
BARBITURATE QUANTITATIVE URINE: NEGATIVE
BASOPHILS # BLD: 0.9 %
BASOPHILS ABSOLUTE: 0.1 THOU/MM3 (ref 0–0.1)
BENZODIAZEPINE QUANTITATIVE URINE: NEGATIVE
BILIRUB SERPL-MCNC: 0.5 MG/DL (ref 0.3–1.2)
BILIRUBIN URINE: NEGATIVE
BLOOD, URINE: NEGATIVE
BUN BLDV-MCNC: 7 MG/DL (ref 7–22)
CALCIUM SERPL-MCNC: 9.9 MG/DL (ref 8.5–10.5)
CANNABINOID QUANTITATIVE URINE: NEGATIVE
CHARACTER, URINE: CLEAR
CHLORIDE BLD-SCNC: 99 MEQ/L (ref 98–111)
CO2: 21 MEQ/L (ref 23–33)
COCAINE METABOLITE QUANTITATIVE URINE: NEGATIVE
COLOR: YELLOW
CREAT SERPL-MCNC: 0.8 MG/DL (ref 0.4–1.2)
EOSINOPHIL # BLD: 1.7 %
EOSINOPHILS ABSOLUTE: 0.2 THOU/MM3 (ref 0–0.4)
ERYTHROCYTE [DISTWIDTH] IN BLOOD BY AUTOMATED COUNT: 15.2 % (ref 11.5–14.5)
ERYTHROCYTE [DISTWIDTH] IN BLOOD BY AUTOMATED COUNT: 47.8 FL (ref 35–45)
ETHYL ALCOHOL, SERUM: < 0.01 %
GFR SERPL CREATININE-BSD FRML MDRD: 73 ML/MIN/1.73M2
GLUCOSE BLD-MCNC: 114 MG/DL (ref 70–108)
GLUCOSE, URINE: NEGATIVE MG/DL
HCT VFR BLD CALC: 44.7 % (ref 37–47)
HEMOGLOBIN: 14.8 GM/DL (ref 12–16)
IMMATURE GRANS (ABS): 0.06 THOU/MM3 (ref 0–0.07)
IMMATURE GRANULOCYTES: 0.5 %
KETONES, URINE: NEGATIVE
LEUKOCYTE EST, POC: NEGATIVE
LYMPHOCYTES # BLD: 14.4 %
LYMPHOCYTES ABSOLUTE: 1.7 THOU/MM3 (ref 1–4.8)
MCH RBC QN AUTO: 28.4 PG (ref 26–33)
MCHC RBC AUTO-ENTMCNC: 33.1 GM/DL (ref 32.2–35.5)
MCV RBC AUTO: 85.8 FL (ref 81–99)
MONOCYTES # BLD: 8.3 %
MONOCYTES ABSOLUTE: 1 THOU/MM3 (ref 0.4–1.3)
NITRITE, URINE: NEGATIVE
NUCLEATED RED BLOOD CELLS: 0 /100 WBC
OPIATES, URINE: NEGATIVE
OSMOLALITY CALCULATION: 267.1 MOSMOL/KG (ref 275–300)
OXYCODONE: NEGATIVE
PH UA: 7
PHENCYCLIDINE QUANTITATIVE URINE: NEGATIVE
PLATELET # BLD: 453 THOU/MM3 (ref 130–400)
PMV BLD AUTO: 10.3 FL (ref 9.4–12.4)
POTASSIUM REFLEX MAGNESIUM: 4.2 MEQ/L (ref 3.5–5.2)
PROTEIN UA: NEGATIVE MG/DL
RBC # BLD: 5.21 MILL/MM3 (ref 4.2–5.4)
SALICYLATE, SERUM: < 0.3 MG/DL (ref 2–10)
SEG NEUTROPHILS: 74.2 %
SEGMENTED NEUTROPHILS ABSOLUTE COUNT: 8.8 THOU/MM3 (ref 1.8–7.7)
SODIUM BLD-SCNC: 134 MEQ/L (ref 135–145)
SPECIFIC GRAVITY UA: 1.01 (ref 1–1.03)
TOTAL PROTEIN: 8.2 G/DL (ref 6.1–8)
TSH SERPL DL<=0.05 MIU/L-ACNC: 1.99 UIU/ML (ref 0.4–4.2)
UROBILINOGEN, URINE: 0.2 EU/DL
WBC # BLD: 11.9 THOU/MM3 (ref 4.8–10.8)

## 2018-09-26 PROCEDURE — G0480 DRUG TEST DEF 1-7 CLASSES: HCPCS

## 2018-09-26 PROCEDURE — 82140 ASSAY OF AMMONIA: CPT

## 2018-09-26 PROCEDURE — 84443 ASSAY THYROID STIM HORMONE: CPT

## 2018-09-26 PROCEDURE — 99285 EMERGENCY DEPT VISIT HI MDM: CPT

## 2018-09-26 PROCEDURE — 1240000000 HC EMOTIONAL WELLNESS R&B

## 2018-09-26 PROCEDURE — 81003 URINALYSIS AUTO W/O SCOPE: CPT

## 2018-09-26 PROCEDURE — 6370000000 HC RX 637 (ALT 250 FOR IP): Performed by: FAMILY MEDICINE

## 2018-09-26 PROCEDURE — 80307 DRUG TEST PRSMV CHEM ANLYZR: CPT

## 2018-09-26 PROCEDURE — 85025 COMPLETE CBC W/AUTO DIFF WBC: CPT

## 2018-09-26 PROCEDURE — 6370000000 HC RX 637 (ALT 250 FOR IP): Performed by: PSYCHIATRY & NEUROLOGY

## 2018-09-26 PROCEDURE — 36415 COLL VENOUS BLD VENIPUNCTURE: CPT

## 2018-09-26 PROCEDURE — 80053 COMPREHEN METABOLIC PANEL: CPT

## 2018-09-26 RX ORDER — HYDROXYZINE HYDROCHLORIDE 25 MG/1
50 TABLET, FILM COATED ORAL 3 TIMES DAILY PRN
Status: DISCONTINUED | OUTPATIENT
Start: 2018-09-26 | End: 2018-09-26

## 2018-09-26 RX ORDER — HALOPERIDOL 5 MG/ML
5 INJECTION INTRAMUSCULAR ONCE
Status: DISCONTINUED | OUTPATIENT
Start: 2018-09-26 | End: 2018-09-29 | Stop reason: HOSPADM

## 2018-09-26 RX ORDER — PANTOPRAZOLE SODIUM 40 MG/1
40 TABLET, DELAYED RELEASE ORAL
Status: DISCONTINUED | OUTPATIENT
Start: 2018-09-27 | End: 2018-09-29 | Stop reason: HOSPADM

## 2018-09-26 RX ORDER — HYDROXYZINE 50 MG/1
50 TABLET, FILM COATED ORAL 3 TIMES DAILY PRN
Status: ON HOLD | COMMUNITY
End: 2018-09-29 | Stop reason: HOSPADM

## 2018-09-26 RX ORDER — LEVOTHYROXINE SODIUM 0.05 MG/1
50 TABLET ORAL DAILY
Status: DISCONTINUED | OUTPATIENT
Start: 2018-09-26 | End: 2018-09-29 | Stop reason: HOSPADM

## 2018-09-26 RX ORDER — QUETIAPINE FUMARATE 100 MG/1
400 TABLET, FILM COATED ORAL NIGHTLY
Status: DISCONTINUED | OUTPATIENT
Start: 2018-09-26 | End: 2018-09-29 | Stop reason: HOSPADM

## 2018-09-26 RX ORDER — LORAZEPAM 1 MG/1
1 TABLET ORAL ONCE
Status: COMPLETED | OUTPATIENT
Start: 2018-09-26 | End: 2018-09-26

## 2018-09-26 RX ORDER — MAGNESIUM HYDROXIDE/ALUMINUM HYDROXICE/SIMETHICONE 120; 1200; 1200 MG/30ML; MG/30ML; MG/30ML
30 SUSPENSION ORAL PRN
Status: DISCONTINUED | OUTPATIENT
Start: 2018-09-26 | End: 2018-09-29 | Stop reason: HOSPADM

## 2018-09-26 RX ORDER — HYDROXYZINE PAMOATE 25 MG/1
25 CAPSULE ORAL 3 TIMES DAILY PRN
Status: DISCONTINUED | OUTPATIENT
Start: 2018-09-26 | End: 2018-09-29 | Stop reason: HOSPADM

## 2018-09-26 RX ORDER — TRAZODONE HYDROCHLORIDE 50 MG/1
50 TABLET ORAL NIGHTLY PRN
Status: DISCONTINUED | OUTPATIENT
Start: 2018-09-26 | End: 2018-09-29 | Stop reason: HOSPADM

## 2018-09-26 RX ORDER — ACETAMINOPHEN 325 MG/1
650 TABLET ORAL EVERY 4 HOURS PRN
Status: DISCONTINUED | OUTPATIENT
Start: 2018-09-26 | End: 2018-09-29 | Stop reason: HOSPADM

## 2018-09-26 RX ADMIN — LEVOTHYROXINE SODIUM 50 MCG: 50 TABLET ORAL at 18:47

## 2018-09-26 RX ADMIN — SERTRALINE 50 MG: 50 TABLET, FILM COATED ORAL at 18:47

## 2018-09-26 RX ADMIN — HYDROXYZINE PAMOATE 25 MG: 25 CAPSULE ORAL at 21:07

## 2018-09-26 RX ADMIN — LORAZEPAM 1 MG: 1 TABLET ORAL at 13:14

## 2018-09-26 RX ADMIN — METOPROLOL TARTRATE 12.5 MG: 25 TABLET ORAL at 21:05

## 2018-09-26 RX ADMIN — TRAZODONE HYDROCHLORIDE 50 MG: 50 TABLET ORAL at 21:24

## 2018-09-26 RX ADMIN — QUETIAPINE FUMARATE 400 MG: 100 TABLET ORAL at 21:05

## 2018-09-26 RX ADMIN — ACETAMINOPHEN 650 MG: 325 TABLET ORAL at 21:07

## 2018-09-26 ASSESSMENT — SLEEP AND FATIGUE QUESTIONNAIRES
DIFFICULTY ARISING: NO
DO YOU USE A SLEEP AID: YES
DIFFICULTY STAYING ASLEEP: YES
DO YOU HAVE DIFFICULTY SLEEPING: YES
DO YOU HAVE DIFFICULTY SLEEPING: YES
DIFFICULTY FALLING ASLEEP: YES
SLEEP PATTERN: DISTURBED/INTERRUPTED SLEEP;RESTLESSNESS;DIFFICULTY FALLING ASLEEP
RESTFUL SLEEP: YES
AVERAGE NUMBER OF SLEEP HOURS: 8
DO YOU USE A SLEEP AID: YES

## 2018-09-26 ASSESSMENT — PAIN DESCRIPTION - LOCATION
LOCATION: BACK
LOCATION: BACK

## 2018-09-26 ASSESSMENT — PAIN SCALES - GENERAL
PAINLEVEL_OUTOF10: 8
PAINLEVEL_OUTOF10: 4
PAINLEVEL_OUTOF10: 7
PAINLEVEL_OUTOF10: 7

## 2018-09-26 ASSESSMENT — PAIN DESCRIPTION - DESCRIPTORS
DESCRIPTORS: ACHING;CONSTANT
DESCRIPTORS: ACHING;CONSTANT

## 2018-09-26 ASSESSMENT — ENCOUNTER SYMPTOMS
VOMITING: 0
NAUSEA: 0
BACK PAIN: 0
SHORTNESS OF BREATH: 0
RHINORRHEA: 0
WHEEZING: 0
EYE PAIN: 0
SORE THROAT: 0
EYE DISCHARGE: 0
DIARRHEA: 0
COUGH: 0
ABDOMINAL PAIN: 0

## 2018-09-26 ASSESSMENT — LIFESTYLE VARIABLES
HISTORY_ALCOHOL_USE: NO
HISTORY_ALCOHOL_USE: NO

## 2018-09-26 ASSESSMENT — PAIN DESCRIPTION - FREQUENCY
FREQUENCY: INTERMITTENT
FREQUENCY: CONTINUOUS

## 2018-09-26 ASSESSMENT — PAIN DESCRIPTION - ORIENTATION
ORIENTATION: LEFT
ORIENTATION: LEFT

## 2018-09-26 ASSESSMENT — PAIN DESCRIPTION - PROGRESSION: CLINICAL_PROGRESSION: GRADUALLY IMPROVING

## 2018-09-26 NOTE — FLOWSHEET NOTE
S:  Patient was very enthused to talk with this  and said that she was doing well. O:  The patient was coloring with another patient when this  arrived. The patient was very expressive and happy to share about what she was experiencing. A:  The patient shared that she did not want to talk about negative things until her medication was accessed and controlled so that she felt better. The patient shared that she has treasure in God and although she does not attend [employer] regularly because of lack of transportation, that she does often pray asking God for forgiveness and to help her be a good person. Portions of Psalm 37 was read with the patient and application was made for the patient's life. We then prayed together. P:  The patient expressed that she wanted to learn as much as she could and would like to meet with any chaplains that are available each day. This  encouraged the patient to notify her attending nurse when she needed to talk and to ask them to contact Spiritual Care.       09/26/18 2666   Encounter Summary   Services provided to: Patient   Referral/Consult From: Nurse   Support System Family members   Continue Visiting Yes  (9/26)   Complexity of Encounter Low   Length of Encounter 30 minutes   Spiritual/Islam   Type Spiritual support   Assessment Calm; Approachable; Hopeful   Intervention Active listening;Explored feelings, thoughts, concerns;Nurtured hope;Prayer;Scripture; Discussed relationship with God   Outcome Connection/belonging;Expressed gratitude;Comfort; Concerns relieved; Shared life review;Expressed feelings/needs/concerns;Encouraged
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
## 2018-09-27 PROCEDURE — G0008 ADMIN INFLUENZA VIRUS VAC: HCPCS | Performed by: PSYCHIATRY & NEUROLOGY

## 2018-09-27 PROCEDURE — 6370000000 HC RX 637 (ALT 250 FOR IP): Performed by: PSYCHIATRY & NEUROLOGY

## 2018-09-27 PROCEDURE — 6360000002 HC RX W HCPCS: Performed by: PSYCHIATRY & NEUROLOGY

## 2018-09-27 PROCEDURE — 90686 IIV4 VACC NO PRSV 0.5 ML IM: CPT | Performed by: PSYCHIATRY & NEUROLOGY

## 2018-09-27 PROCEDURE — 1240000000 HC EMOTIONAL WELLNESS R&B

## 2018-09-27 PROCEDURE — APPSS45 APP SPLIT SHARED TIME 31-45 MINUTES: Performed by: PHYSICIAN ASSISTANT

## 2018-09-27 RX ADMIN — SERTRALINE 50 MG: 50 TABLET, FILM COATED ORAL at 09:04

## 2018-09-27 RX ADMIN — HYDROXYZINE PAMOATE 25 MG: 25 CAPSULE ORAL at 21:55

## 2018-09-27 RX ADMIN — METOPROLOL TARTRATE 12.5 MG: 25 TABLET ORAL at 09:04

## 2018-09-27 RX ADMIN — PANTOPRAZOLE SODIUM 40 MG: 40 TABLET, DELAYED RELEASE ORAL at 06:48

## 2018-09-27 RX ADMIN — QUETIAPINE FUMARATE 400 MG: 100 TABLET ORAL at 20:38

## 2018-09-27 RX ADMIN — METOPROLOL TARTRATE 12.5 MG: 25 TABLET ORAL at 12:05

## 2018-09-27 RX ADMIN — LEVOTHYROXINE SODIUM 50 MCG: 50 TABLET ORAL at 06:48

## 2018-09-27 RX ADMIN — INFLUENZA A VIRUS A/MICHIGAN/45/2015 X-275 (H1N1) ANTIGEN (FORMALDEHYDE INACTIVATED), INFLUENZA A VIRUS A/SINGAPORE/INFIMH-16-0019/2016 IVR-186 (H3N2) ANTIGEN (FORMALDEHYDE INACTIVATED), INFLUENZA B VIRUS B/PHUKET/3073/2013 ANTIGEN (FORMALDEHYDE INACTIVATED), AND INFLUENZA B VIRUS B/MARYLAND/15/2016 BX-69A ANTIGEN (FORMALDEHYDE INACTIVATED) 0.5 ML: 15; 15; 15; 15 INJECTION, SUSPENSION INTRAMUSCULAR at 09:06

## 2018-09-27 RX ADMIN — ACETAMINOPHEN 650 MG: 325 TABLET ORAL at 18:49

## 2018-09-27 RX ADMIN — TRAZODONE HYDROCHLORIDE 50 MG: 50 TABLET ORAL at 22:20

## 2018-09-27 ASSESSMENT — PAIN SCALES - GENERAL
PAINLEVEL_OUTOF10: 0
PAINLEVEL_OUTOF10: 0
PAINLEVEL_OUTOF10: 5
PAINLEVEL_OUTOF10: 0

## 2018-09-27 NOTE — PROGRESS NOTES
Group Therapy Note    Date: 9/27/2018       Start Time: 1630  End Time:  1700      Number of Participants: 7 attended, 8 patients declined participation    Type of Group: Healthy Living/Wellness: Causes of Stress and Ways to [place]    Status After Intervention:  Improved    Participation Level: Interactive    Participation Quality: Appropriate    Level of consciousness:  Alert    Response to Learning: Able to verbalize current knowledge/experience          Signature:  Angelika Limon, DEWAYNE, LPN, CDP

## 2018-09-27 NOTE — PLAN OF CARE
Problem: Knowledge Deficit  Goal: Knowledge - personal safety  Outcome: Ongoing  Will have completed a personal safety plan at time of d/c. Problem: Altered Mood, Psychotic Behavior:  Intervention: Promote group participation  Will continue to promote group therapy session participation. Has attended all group sessions so far this morning. Intervention: Supervise medication intake  Daily, qshift. Took with ease this morning. Intervention: Group therapy sessions to increase activity level  Ongoing      Goal: Able to demonstrate trust by eating, participating in treatment and following staff's direction  Able to demonstrate trust by eating, participating in treatment and following staff's direction   Outcome: Met This Shift  Patient is able to demonstrate her trust from writer as she eats, takes medications, and is able to follow staff's direction. Goal: Able to verbalize decrease in frequency and intensity of hallucinations  Able to verbalize decrease in frequency and intensity of hallucinations   Outcome: Met This Shift  Voices no hallucinations this shift. Will continue to monitor daily and prn. Goal: Ability to interact with others will improve  Ability to interact with others will improve   Outcome: Ongoing  Ongoing    Goal: Compliance with prescribed medication regimen will improve  Compliance with prescribed medication regimen will improve   Outcome: Met This Shift  Patient very nicely complies with her prescribed medication regimen this shift. Goal: Patient specific goal  Patient specific goal   Outcome: Met This Shift  Patient reports goal for today is to \"relax and take all of the positive in. \"  This was met because patient   has attended group sessions and appears to be interacting more and more with staff and peers. Goal: Able to verbalize reality based thinking  Able to verbalize reality based thinking   Outcome: Ongoing  Patient verbalizes reality based thinking this shift.      Problem:

## 2018-09-27 NOTE — H&P
([facility]     Seizures ([facility]     Sinusitis     Tension headache, chronic     Weight loss        Past Surgical History:        Procedure Laterality Date    APPENDECTOMY      CARDIOVASCULAR STRESS TEST  5/2011    COLONOSCOPY      7/2014    HYSTERECTOMY      MAMMO IMPLANT DIGITAL DIAG BI  06/2011    repeat in 12/2011    SHOULDER SURGERY      LEONILA AND BSO         Medications Prior to Admission:   Prescriptions Prior to Admission: hydrOXYzine (ATARAX) 50 MG tablet, Take 50 mg by mouth 3 times daily as needed for Itching  sertraline (ZOLOFT) 50 MG tablet, Take 1 tablet by mouth daily  traZODone (DESYREL) 50 MG tablet, Take 1 tablet by mouth nightly as needed for Sleep  QUEtiapine (SEROQUEL) 200 MG tablet, Take 1 tablet by mouth nightly (Patient taking differently: Take 400 mg by mouth nightly )  metoprolol tartrate (LOPRESSOR) 25 MG tablet, Take 0.5 tablets by mouth 2 times daily  levothyroxine (SYNTHROID) 50 MCG tablet, Take 1 tablet by mouth Daily  omeprazole (PRILOSEC) 20 MG delayed release capsule, Take 1 capsule by mouth daily  [DISCONTINUED] clonazePAM (KLONOPIN) 2 MG tablet, TAKE TWO TABLETS BY MOUTH AT BEDTIME  [DISCONTINUED] Misc. Devices (WHEELCHAIR) MISC, Dispense 1 Power Mobility Device  [DISCONTINUED] gabapentin (NEURONTIN) 800 MG tablet, Take 1 tablet by mouth 4 times daily (Patient taking differently: Take 600 mg by mouth 3 times daily. Nilam Sniff )  [DISCONTINUED] tiZANidine (ZANAFLEX) 4 MG tablet, Take 1 tablet by mouth 3 times daily  [DISCONTINUED] galantamine (RAZADYNE) 8 MG tablet, Take 1 tablet by mouth 2 times daily  [DISCONTINUED] Calcium Carb-Cholecalciferol (CALCIUM + D3) 600-200 MG-UNIT TABS tablet, Take 1 tablet by mouth 2 times daily  [DISCONTINUED] aspirin (ASPIRIN LOW DOSE) 81 MG EC tablet, take 1 tablet once daily    Allergies:  Patient has no known allergies. Social History:     Lives alone in an apartment in [place]. AOD use denied.       Family Psychiatric and Medical History:

## 2018-09-27 NOTE — PATIENT CARE CONFERENCE
[facility]  Initial Interdisciplinary Treatment Plan NOTE    Review Date & Time: 9/27/18     Patient was in treatment team    Admission Type:   Admission Type: Voluntary    Reason for admission:  Reason for Admission: \"I'm not on my right medications.  The medication I am on is too strong\"      Estimated Length of Stay Update:  3-5 days  Estimated Discharge Date Update: 3-5 days    PATIENT STRENGTHS:  Patient Strengths Strengths: Connection to output provider  Patient Strengths and Limitations:Limitations: Difficulty problem solving/relies on others to help solve problems  Addictive Behavior:Addictive Behavior  In the past 3 months, have you felt or has someone told you that you have a problem with:  : None  Do you have a history of Chemical Use?: No  Do you have a history of Alcohol Use?: No  Do you have a history of Street Drug Abuse?: Comment (use to smoke pot)  Histroy of Prescripton Drug Abuse?: No  Medical Problems:  Past Medical History:   Diagnosis Date    Bipolar disorder ([address]     Cerebral artery occlusion with cerebral infarction ([address]     Chronic back pain     Colon polyps     COPD (chronic obstructive pulmonary disease) ([address]     Depression     Dermatitis seborrheica     DJD (degenerative joint disease)     Hyperlipidemia     Multiple sclerosis ([address] dx 2003    Relapse/Remitting type    Need for other prophylactic chemotherapy(V07.39)     Obesity     Osteoarthritis     Back    Osteopenia     Panic disorder without agoraphobia     Personal history of fall     Schizoaffective disorder ([address]     Secondary parkinsonism ([address]     Seizures ([address]     Sinusitis     Tension headache, chronic     Weight loss        EDUCATION:   Learner Progress Toward Treatment Goals: Reviewed results and recommendations of this team, Reviewed group plan and strategies, Reviewed signs, symptoms and risk of self harm and violent behavior and Reviewed goals and plan of care    Method: Small
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
## 2018-09-28 PROCEDURE — 99231 SBSQ HOSP IP/OBS SF/LOW 25: CPT | Performed by: PSYCHIATRY & NEUROLOGY

## 2018-09-28 PROCEDURE — 6370000000 HC RX 637 (ALT 250 FOR IP): Performed by: PSYCHIATRY & NEUROLOGY

## 2018-09-28 PROCEDURE — 1240000000 HC EMOTIONAL WELLNESS R&B

## 2018-09-28 RX ADMIN — LEVOTHYROXINE SODIUM 50 MCG: 50 TABLET ORAL at 08:50

## 2018-09-28 RX ADMIN — HYDROXYZINE PAMOATE 25 MG: 25 CAPSULE ORAL at 08:50

## 2018-09-28 RX ADMIN — METOPROLOL TARTRATE 12.5 MG: 25 TABLET ORAL at 08:48

## 2018-09-28 RX ADMIN — SERTRALINE 50 MG: 50 TABLET, FILM COATED ORAL at 08:48

## 2018-09-28 RX ADMIN — QUETIAPINE FUMARATE 400 MG: 100 TABLET ORAL at 20:39

## 2018-09-28 RX ADMIN — HYDROXYZINE PAMOATE 25 MG: 25 CAPSULE ORAL at 15:30

## 2018-09-28 RX ADMIN — PANTOPRAZOLE SODIUM 40 MG: 40 TABLET, DELAYED RELEASE ORAL at 08:48

## 2018-09-28 RX ADMIN — METOPROLOL TARTRATE 12.5 MG: 25 TABLET ORAL at 20:40

## 2018-09-28 NOTE — PLAN OF CARE
Problem: Knowledge Deficit  Goal: Knowledge - personal safety  Outcome: Ongoing  Encouraged to complete safety plan prior to dishcharge. Problem: Altered Mood, Psychotic Behavior:  Goal: Able to demonstrate trust by eating, participating in treatment and following staff's direction  Able to demonstrate trust by eating, participating in treatment and following staff's direction   Outcome: Ongoing  Learning to demonstrate trust by eating, participating in treatment and following staff's direction. Goal: Able to verbalize decrease in frequency and intensity of hallucinations  Able to verbalize decrease in frequency and intensity of hallucinations   Outcome: Met This Shift  Denies hallucinations. Goal: Ability to interact with others will improve  Ability to interact with others will improve   Outcome: Met This Shift  Observed interacting with select female peer. Goal: Compliance with prescribed medication regimen will improve  Compliance with prescribed medication regimen will improve   Outcome: Met This Shift  Compliant with prescribed medication. Goal: Patient specific goal  Patient specific goal   Outcome: Met This Shift  Patient reports that she met her goal - \"relax and take all the positive in. \"  Goal: Able to verbalize reality based thinking  Able to verbalize reality based thinking   Outcome: Met This Shift  Verbalizes reality based thinking. Problem: Discharge Planning:  Goal: Discharged to appropriate level of care  Discharged to appropriate level of care   Outcome: Ongoing  Working with multidisciplinary treatment team for ongoing continuity of care and relapse prevention. Problem: Falls - Risk of:  Goal: Will remain free from falls  Will remain free from falls   Outcome: Met This Shift  Patient is free from falls this shift. Goal: Absence of physical injury  Absence of physical injury   Outcome: Met This Shift  No physical injury.     Problem: Anxiety:  Goal: Level of anxiety will

## 2018-09-28 NOTE — PROGRESS NOTES
Group Therapy Note    Date: 9/28/2018  Start Time: 1100  End Time:  1200  Number of Participants: 11    Type of Group: Psychotherapy    Wellness Binder Information  Module Name:  Relationship group. Session Number:  NA    Notes:  Peer is present for group as the peer discussion examined the quality of the relationships in their lives. Peers discussed and identified \"inherited beliefs\" which may be inaccurate and contributing to emotional distress today. Peers identified ways to identify and modify those beliefs with a focus on the here and now. Pt is attentive with appropriate participation. Relates with common peer experiences. Pt discussed how her Father's experience with combat may have impacted him and his subsequent role as a father. Status After Intervention:  Improved    Participation Level:  Active Listener and Interactive    Participation Quality: Appropriate, Attentive, Sharing and Supportive      Speech:  normal      Thought Process/Content: Logical  Linear      Affective Functioning: Flat      Mood: dysphoric      Level of consciousness:  Alert, Oriented x4 and Attentive      Response to Learning: Able to verbalize current knowledge/experience, Able to verbalize/acknowledge new learning, Able to retain information, Capable of insight, Able to change behavior and Progressing to goal      Endings: None Reported    Modes of Intervention: Education, Support, Socialization, Exploration, Clarifying and Problem-solving      Discipline Responsible: /Counselor      Signature:  GURPREET Moon

## 2018-09-28 NOTE — PROGRESS NOTES
This RN has reviewed and agrees with Sugey Burgess LPN's data collection and has collaborated with this LPN regarding the patient's care plan.
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
## 2018-09-29 VITALS
DIASTOLIC BLOOD PRESSURE: 53 MMHG | WEIGHT: 168 LBS | HEIGHT: 63 IN | RESPIRATION RATE: 17 BRPM | OXYGEN SATURATION: 100 % | TEMPERATURE: 96.6 F | BODY MASS INDEX: 29.77 KG/M2 | HEART RATE: 75 BPM | SYSTOLIC BLOOD PRESSURE: 98 MMHG

## 2018-09-29 PROCEDURE — 99238 HOSP IP/OBS DSCHRG MGMT 30/<: CPT | Performed by: NURSE PRACTITIONER

## 2018-09-29 PROCEDURE — 5130000000 HC BRIDGE APPOINTMENT

## 2018-09-29 PROCEDURE — 6370000000 HC RX 637 (ALT 250 FOR IP): Performed by: PSYCHIATRY & NEUROLOGY

## 2018-09-29 RX ORDER — QUETIAPINE FUMARATE 400 MG/1
400 TABLET, FILM COATED ORAL NIGHTLY
Qty: 30 TABLET | Refills: 0 | Status: ON HOLD | OUTPATIENT
Start: 2018-09-29 | End: 2018-10-13 | Stop reason: HOSPADM

## 2018-09-29 RX ORDER — HYDROXYZINE PAMOATE 25 MG/1
25 CAPSULE ORAL 3 TIMES DAILY PRN
Qty: 42 CAPSULE | Refills: 0 | Status: ON HOLD | OUTPATIENT
Start: 2018-09-29 | End: 2018-10-13

## 2018-09-29 RX ADMIN — LEVOTHYROXINE SODIUM 50 MCG: 50 TABLET ORAL at 05:53

## 2018-09-29 RX ADMIN — SERTRALINE 50 MG: 50 TABLET, FILM COATED ORAL at 09:28

## 2018-09-29 RX ADMIN — PANTOPRAZOLE SODIUM 40 MG: 40 TABLET, DELAYED RELEASE ORAL at 05:52

## 2018-09-29 ASSESSMENT — PAIN SCALES - GENERAL: PAINLEVEL_OUTOF10: 0
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
## 2018-09-29 NOTE — PROGRESS NOTES
Behavioral Health   Discharge Note    Pt discharged with followings belongings:   Dentures: Uppers  Vision - Corrective Lenses: Other (Comment) (does not have her glasses with her)  Hearing Aid: None  Jewelry: None  Body Piercings Removed: N/A  Clothing: Footwear, Pants, Shirt, Undergarments (Comment)  Were All Patient Medications Collected?: Yes  Other Valuables: Mary Beth Can   Belongings returned to patient. Patient left department with Departure Mode: By self, In cab via Mobility at Departure: Ambulatory, discharged to Discharged to: Private Residence. \"An Important Message from [facility] About Your Rights\" form reviewed, signed yes. Patient education on aftercare instructions: yes  Bridge Appointment completed: Reviewed Discharge Instructions with patient. Patient verbalizes understanding and agreement with the discharge plan using the teachback method. Information faxed to follow up provider by  Patient verbalize understanding of AVS:  yes. Status EXAM upon discharge:  Status and Exam  Normal: Yes  Facial Expression: Brightened  Affect: Appropriate  Level of Consciousness: Alert  Mood:Normal: Yes  Mood: Anxious  Motor Activity:Normal: Yes  Interview Behavior: Cooperative  Preception: [place] to Person, Kam Redo to Time, [place] to Place, [place] to Situation  Attention:Normal: Yes  Attention: Hyperalert  Thought Processes: Circumstantial  Thought Content:Normal: Yes  Thought Content: Paranoia  Hallucinations: None  Delusions: No  Delusions:  Other(See Comment) (paranoia)  Memory:Normal: Yes  Memory: Other(See comment)  Insight and Judgment: No  Insight and Judgment: Poor Judgment  Present Suicidal Ideation: No  Present Homicidal Ideation: No    Sarah Mehta LPN  2

## 2018-09-29 NOTE — DISCHARGE SUMMARY
hospital attire, in chair and fair grooming   Behavior/Motor: no abnormalities noted   Attitude toward examiner: cooperative   Speech: Normal volume, goal directed, NRR  Mood: Euthymic  Affect: Reactive  Thought processes: Linear and goal directed   Suicidal Ideation: Denies suicidal ideations  Homicidal ideation: Denies homicidal ideations  Delusions: No evidence of delusions is observed  Perceptual Disturbance: Denies AH/VH;  No evidence of psychosis is observed. Cognition: Oriented to person, place, time and situation   Concentration fair   Memory intact   Insight: Limited  Judgment: Limited    Impression:  Schizoaffective D/O Depressed Type    Disposition:  Rashid Delarosa can be discharged home with agreement to F/U with Sebastien Macias CNP ay [address] for continued medication management. Also to continue her discharge meds. Joseph Mora CNP   Time Spent: 25 minutes    I assessed this patient and reviewed the case and plan of care with Joseph Mora CNP.   I have reviewed the above documentation and I agree with the findings and treatment & discharge plan as written  Electronically signed by Zach Mejias. on 9/29/2018 at 12:26 PM

## 2018-09-30 ENCOUNTER — NURSE TRIAGE (OUTPATIENT)
Dept: ADMINISTRATIVE | Age: 63
End: 2018-09-30

## 2018-10-06 ENCOUNTER — HOSPITAL ENCOUNTER (EMERGENCY)
Age: 63
Discharge: HOME | End: 2018-10-06
Payer: MEDICARE

## 2018-10-06 ENCOUNTER — APPOINTMENT (OUTPATIENT)
Dept: GENERAL RADIOLOGY | Age: 63
End: 2018-10-06
Payer: MEDICARE

## 2018-10-06 VITALS
OXYGEN SATURATION: 100 % | HEIGHT: 63 IN | BODY MASS INDEX: 29.59 KG/M2 | WEIGHT: 167 LBS | DIASTOLIC BLOOD PRESSURE: 69 MMHG | RESPIRATION RATE: 18 BRPM | SYSTOLIC BLOOD PRESSURE: 128 MMHG | HEART RATE: 79 BPM | TEMPERATURE: 98 F

## 2018-10-06 DIAGNOSIS — R07.89 MUSCULOSKELETAL CHEST PAIN: Primary | ICD-10-CM

## 2018-10-06 DIAGNOSIS — F41.1 ANXIETY STATE: ICD-10-CM

## 2018-10-06 LAB
ACETAMINOPHEN LEVEL: < 5 UG/ML (ref 0–20)
ALBUMIN SERPL-MCNC: 4.4 G/DL (ref 3.5–5.1)
ALP BLD-CCNC: 116 U/L (ref 38–126)
ALT SERPL-CCNC: 10 U/L (ref 11–66)
AMPHETAMINE+METHAMPHETAMINE URINE SCREEN: NEGATIVE
ANION GAP SERPL CALCULATED.3IONS-SCNC: 14 MEQ/L (ref 8–16)
AST SERPL-CCNC: 13 U/L (ref 5–40)
BACTERIA: ABNORMAL /HPF
BARBITURATE QUANTITATIVE URINE: NEGATIVE
BASOPHILS # BLD: 0.9 %
BASOPHILS ABSOLUTE: 0.1 THOU/MM3 (ref 0–0.1)
BENZODIAZEPINE QUANTITATIVE URINE: NEGATIVE
BILIRUB SERPL-MCNC: 0.2 MG/DL (ref 0.3–1.2)
BILIRUBIN DIRECT: < 0.2 MG/DL (ref 0–0.3)
BILIRUBIN URINE: NEGATIVE
BLOOD, URINE: NEGATIVE
BUN BLDV-MCNC: 12 MG/DL (ref 7–22)
CALCIUM SERPL-MCNC: 9.7 MG/DL (ref 8.5–10.5)
CANNABINOID QUANTITATIVE URINE: NEGATIVE
CASTS 2: ABNORMAL /LPF
CASTS UA: ABNORMAL /LPF
CHARACTER, URINE: ABNORMAL
CHLORIDE BLD-SCNC: 98 MEQ/L (ref 98–111)
CO2: 24 MEQ/L (ref 23–33)
COCAINE METABOLITE QUANTITATIVE URINE: NEGATIVE
COLOR: YELLOW
CREAT SERPL-MCNC: 0.7 MG/DL (ref 0.4–1.2)
CRYSTALS, UA: ABNORMAL
D-DIMER QUANTITATIVE: 284 NG/ML FEU (ref 0–500)
EKG ATRIAL RATE: 70 BPM
EKG P AXIS: 66 DEGREES
EKG P-R INTERVAL: 182 MS
EKG Q-T INTERVAL: 414 MS
EKG QRS DURATION: 76 MS
EKG QTC CALCULATION (BAZETT): 447 MS
EKG R AXIS: 5 DEGREES
EKG T AXIS: 49 DEGREES
EKG VENTRICULAR RATE: 70 BPM
EOSINOPHIL # BLD: 2.1 %
EOSINOPHILS ABSOLUTE: 0.3 THOU/MM3 (ref 0–0.4)
EPITHELIAL CELLS, UA: ABNORMAL /HPF
ERYTHROCYTE [DISTWIDTH] IN BLOOD BY AUTOMATED COUNT: 15.1 % (ref 11.5–14.5)
ERYTHROCYTE [DISTWIDTH] IN BLOOD BY AUTOMATED COUNT: 46.5 FL (ref 35–45)
ETHYL ALCOHOL, SERUM: < 0.01 %
GFR SERPL CREATININE-BSD FRML MDRD: 85 ML/MIN/1.73M2
GLUCOSE BLD-MCNC: 111 MG/DL (ref 70–108)
GLUCOSE URINE: NEGATIVE MG/DL
HCT VFR BLD CALC: 43.1 % (ref 37–47)
HEMOGLOBIN: 14 GM/DL (ref 12–16)
IMMATURE GRANS (ABS): 0.11 THOU/MM3 (ref 0–0.07)
IMMATURE GRANULOCYTES: 0.9 %
KETONES, URINE: NEGATIVE
LEUKOCYTE ESTERASE, URINE: ABNORMAL
LYMPHOCYTES # BLD: 16.5 %
LYMPHOCYTES ABSOLUTE: 2.1 THOU/MM3 (ref 1–4.8)
MCH RBC QN AUTO: 27.8 PG (ref 26–33)
MCHC RBC AUTO-ENTMCNC: 32.5 GM/DL (ref 32.2–35.5)
MCV RBC AUTO: 85.7 FL (ref 81–99)
MISCELLANEOUS 2: ABNORMAL
MONOCYTES # BLD: 8.8 %
MONOCYTES ABSOLUTE: 1.1 THOU/MM3 (ref 0.4–1.3)
NITRITE, URINE: NEGATIVE
NUCLEATED RED BLOOD CELLS: 0 /100 WBC
OPIATES, URINE: NEGATIVE
OSMOLALITY CALCULATION: 272.4 MOSMOL/KG (ref 275–300)
OXYCODONE: NEGATIVE
PH UA: 7
PHENCYCLIDINE QUANTITATIVE URINE: NEGATIVE
PLATELET # BLD: 454 THOU/MM3 (ref 130–400)
PMV BLD AUTO: 9.4 FL (ref 9.4–12.4)
POTASSIUM SERPL-SCNC: 4.8 MEQ/L (ref 3.5–5.2)
PROTEIN UA: NEGATIVE
RBC # BLD: 5.03 MILL/MM3 (ref 4.2–5.4)
RBC URINE: ABNORMAL /HPF
RENAL EPITHELIAL, UA: ABNORMAL
SALICYLATE, SERUM: < 0.3 MG/DL (ref 2–10)
SEG NEUTROPHILS: 70.8 %
SEGMENTED NEUTROPHILS ABSOLUTE COUNT: 8.9 THOU/MM3 (ref 1.8–7.7)
SODIUM BLD-SCNC: 136 MEQ/L (ref 135–145)
SPECIFIC GRAVITY, URINE: 1.01 (ref 1–1.03)
TOTAL PROTEIN: 7.4 G/DL (ref 6.1–8)
TROPONIN T: < 0.01 NG/ML
TROPONIN T: < 0.01 NG/ML
TSH SERPL DL<=0.05 MIU/L-ACNC: 1.51 UIU/ML (ref 0.4–4.2)
UROBILINOGEN, URINE: 0.2 EU/DL
WBC # BLD: 12.6 THOU/MM3 (ref 4.8–10.8)
WBC UA: ABNORMAL /HPF
YEAST: ABNORMAL

## 2018-10-06 PROCEDURE — 84484 ASSAY OF TROPONIN QUANT: CPT

## 2018-10-06 PROCEDURE — 82248 BILIRUBIN DIRECT: CPT

## 2018-10-06 PROCEDURE — 87086 URINE CULTURE/COLONY COUNT: CPT

## 2018-10-06 PROCEDURE — 36415 COLL VENOUS BLD VENIPUNCTURE: CPT

## 2018-10-06 PROCEDURE — 80307 DRUG TEST PRSMV CHEM ANLYZR: CPT

## 2018-10-06 PROCEDURE — 93010 ELECTROCARDIOGRAM REPORT: CPT | Performed by: INTERNAL MEDICINE

## 2018-10-06 PROCEDURE — 99285 EMERGENCY DEPT VISIT HI MDM: CPT

## 2018-10-06 PROCEDURE — 85025 COMPLETE CBC W/AUTO DIFF WBC: CPT

## 2018-10-06 PROCEDURE — 93005 ELECTROCARDIOGRAM TRACING: CPT | Performed by: PHYSICIAN ASSISTANT

## 2018-10-06 PROCEDURE — 85379 FIBRIN DEGRADATION QUANT: CPT

## 2018-10-06 PROCEDURE — 71045 X-RAY EXAM CHEST 1 VIEW: CPT

## 2018-10-06 PROCEDURE — 84443 ASSAY THYROID STIM HORMONE: CPT

## 2018-10-06 PROCEDURE — G0480 DRUG TEST DEF 1-7 CLASSES: HCPCS

## 2018-10-06 PROCEDURE — 81001 URINALYSIS AUTO W/SCOPE: CPT

## 2018-10-06 PROCEDURE — 80053 COMPREHEN METABOLIC PANEL: CPT

## 2018-10-06 PROCEDURE — 6370000000 HC RX 637 (ALT 250 FOR IP): Performed by: PHYSICIAN ASSISTANT

## 2018-10-06 RX ORDER — LORAZEPAM 1 MG/1
1 TABLET ORAL ONCE
Status: COMPLETED | OUTPATIENT
Start: 2018-10-06 | End: 2018-10-06

## 2018-10-06 RX ADMIN — LORAZEPAM 1 MG: 1 TABLET ORAL at 15:25

## 2018-10-06 ASSESSMENT — SLEEP AND FATIGUE QUESTIONNAIRES
DIFFICULTY STAYING ASLEEP: NO
SLEEP PATTERN: NORMAL
DIFFICULTY FALLING ASLEEP: NO
DIFFICULTY ARISING: NO
DO YOU USE A SLEEP AID: YES
DO YOU HAVE DIFFICULTY SLEEPING: NO
AVERAGE NUMBER OF SLEEP HOURS: 8
RESTFUL SLEEP: YES

## 2018-10-06 ASSESSMENT — ENCOUNTER SYMPTOMS
CONSTIPATION: 0
ABDOMINAL PAIN: 0
SHORTNESS OF BREATH: 0
VOMITING: 0
NAUSEA: 0
DIARRHEA: 0

## 2018-10-06 ASSESSMENT — HEART SCORE: ECG: 0

## 2018-10-06 NOTE — ED NOTES
Pt states \" I feel so much better now. \" Pt sitting on cot. No concerns voiced at this time.      Tobi Stewart RN  10/06/18 1636

## 2018-10-06 NOTE — ED PROVIDER NOTES
which are inherent in voice recognition technology. **      Final report electronically signed by Dr. Rishi Montalvo on 10/6/2018 3:05 PM          LABS:   Labs Reviewed   CBC WITH AUTO DIFFERENTIAL - Abnormal; Notable for the following:        Result Value    WBC 12.6 (*)     RDW-CV 15.1 (*)     RDW-SD 46.5 (*)     Platelets 540 (*)     Segs Absolute 8.9 (*)     Immature Grans (Abs) 0.11 (*)     All other components within normal limits   BASIC METABOLIC PANEL - Abnormal; Notable for the following:     Glucose 111 (*)     All other components within normal limits   HEPATIC FUNCTION PANEL - Abnormal; Notable for the following:      Total Bilirubin 0.2 (*)     ALT 10 (*)     All other components within normal limits   SALICYLATE LEVEL - Abnormal; Notable for the following:     Salicylate, Serum < 0.3 (*)     All other components within normal limits   URINE WITH REFLEXED MICRO - Abnormal; Notable for the following:     Leukocyte Esterase, Urine TRACE (*)     Character, Urine CLOUDY (*)     All other components within normal limits   GLOMERULAR FILTRATION RATE, ESTIMATED - Abnormal; Notable for the following:     Est, Glom Filt Rate 85 (*)     All other components within normal limits   OSMOLALITY - Abnormal; Notable for the following:     Osmolality Calc 272.4 (*)     All other components within normal limits   URINE CULTURE, REFLEXED    Narrative:     Source: urine, clean catch       Site: clean void          Current Antibiotics: not stated   TSH WITHOUT REFLEX   ACETAMINOPHEN LEVEL   ETHANOL   URINE DRUG SCREEN   TROPONIN   ANION GAP   D-DIMER, QUANTITATIVE   TROPONIN       EMERGENCY DEPARTMENT COURSE:   Vitals:    Vitals:    10/06/18 1331 10/06/18 1504 10/06/18 1614 10/06/18 1735   BP: 124/66 (!) 136/59 122/76 128/69   Pulse: 72 84 81 79   Resp: 16 22 22 18   Temp: 98 °F (36.7 °C)      TempSrc: Oral      SpO2: 99% 99% 99% 100%   Weight: 167 lb (75.8 kg)      Height: 5' 3\" (1.6 m)        The patient was seen and agreed with my workup, treatment, and disposition decisions. [facility] - Provided the patient with follow-up outpatient psychiatric resources    Dr. Amilcar Hill, Psychiatry - Does not believe that the patient represents a risk of harming herself or others and recommends discharge to home with outpatient psychiatric follow-up per [facility]. PROCEDURES:  None    FINAL IMPRESSION      1. Musculoskeletal chest pain    2. Anxiety state          DISPOSITION/PLAN     1. Musculoskeletal chest pain    2. Anxiety state      I have given the patient strict written and verbal instructions about care at home, follow-up, and signs and symptoms of worsening of condition, and the patient did verbalize understanding of these instructions. Patient was discharged in stable condition. Will return if symptoms change or worsen, or for any sign or symptom deemed emergent by the patient or family members. Follow up as an outpatient or sooner if symptoms warrant. PATIENT REFERRED TO:  [facility] [address] [place] [address]  [phone]  Go on 10/10/2018  For chestpain re-check on 10/10/18 at 1030.       DISCHARGE MEDICATIONS:  Discharge Medication List as of 10/6/2018  6:10 PM          (Please note that portions of this note were completed with a voice recognition program.  Efforts were made to edit the dictations but occasionally words are mis-transcribed.)    SYLVIE De Paz PA-C  10/06/18 [address] PA-C  10/06/18 0823

## 2018-10-07 LAB
ORGANISM: ABNORMAL
URINE CULTURE REFLEX: ABNORMAL

## 2018-10-10 ENCOUNTER — OFFICE VISIT (OUTPATIENT)
Dept: CARDIOLOGY CLINIC | Age: 63
End: 2018-10-10
Payer: MEDICARE

## 2018-10-10 ENCOUNTER — APPOINTMENT (OUTPATIENT)
Dept: GENERAL RADIOLOGY | Age: 63
DRG: 885 | End: 2018-10-10
Payer: MEDICARE

## 2018-10-10 ENCOUNTER — HOSPITAL ENCOUNTER (INPATIENT)
Age: 63
LOS: 3 days | Discharge: HOME | DRG: 885 | End: 2018-10-13
Attending: PSYCHIATRY & NEUROLOGY | Admitting: PSYCHIATRY & NEUROLOGY
Payer: MEDICARE

## 2018-10-10 VITALS
DIASTOLIC BLOOD PRESSURE: 68 MMHG | WEIGHT: 170 LBS | BODY MASS INDEX: 30.12 KG/M2 | HEART RATE: 84 BPM | HEIGHT: 63 IN | SYSTOLIC BLOOD PRESSURE: 132 MMHG

## 2018-10-10 DIAGNOSIS — F17.200 SMOKING: ICD-10-CM

## 2018-10-10 DIAGNOSIS — F25.9 SCHIZOAFFECTIVE DISORDER, UNSPECIFIED TYPE (HCC): Primary | ICD-10-CM

## 2018-10-10 DIAGNOSIS — R06.09 DYSPNEA ON EXERTION: ICD-10-CM

## 2018-10-10 DIAGNOSIS — R07.2 PRECORDIAL PAIN: Primary | ICD-10-CM

## 2018-10-10 LAB
ACETAMINOPHEN LEVEL: < 5 UG/ML (ref 0–20)
ALBUMIN SERPL-MCNC: 4.3 G/DL (ref 3.5–5.1)
ALP BLD-CCNC: 115 U/L (ref 38–126)
ALT SERPL-CCNC: 11 U/L (ref 11–66)
AMPHETAMINE+METHAMPHETAMINE URINE SCREEN: NEGATIVE
ANION GAP SERPL CALCULATED.3IONS-SCNC: 14 MEQ/L (ref 8–16)
AST SERPL-CCNC: 14 U/L (ref 5–40)
ATYPICAL LYMPHOCYTES: ABNORMAL %
BARBITURATE QUANTITATIVE URINE: NEGATIVE
BASOPHILS # BLD: 0.6 %
BASOPHILS ABSOLUTE: 0.1 THOU/MM3 (ref 0–0.1)
BENZODIAZEPINE QUANTITATIVE URINE: NEGATIVE
BILIRUB SERPL-MCNC: 0.2 MG/DL (ref 0.3–1.2)
BILIRUBIN DIRECT: < 0.2 MG/DL (ref 0–0.3)
BUN BLDV-MCNC: 12 MG/DL (ref 7–22)
CALCIUM SERPL-MCNC: 9.7 MG/DL (ref 8.5–10.5)
CANNABINOID QUANTITATIVE URINE: NEGATIVE
CHLORIDE BLD-SCNC: 97 MEQ/L (ref 98–111)
CO2: 23 MEQ/L (ref 23–33)
COCAINE METABOLITE QUANTITATIVE URINE: NEGATIVE
CREAT SERPL-MCNC: 0.8 MG/DL (ref 0.4–1.2)
EKG ATRIAL RATE: 72 BPM
EKG P AXIS: 67 DEGREES
EKG P-R INTERVAL: 184 MS
EKG Q-T INTERVAL: 410 MS
EKG QRS DURATION: 76 MS
EKG QTC CALCULATION (BAZETT): 448 MS
EKG R AXIS: -9 DEGREES
EKG T AXIS: 51 DEGREES
EKG VENTRICULAR RATE: 72 BPM
EOSINOPHIL # BLD: 2.2 %
EOSINOPHILS ABSOLUTE: 0.3 THOU/MM3 (ref 0–0.4)
ERYTHROCYTE [DISTWIDTH] IN BLOOD BY AUTOMATED COUNT: 14.7 % (ref 11.5–14.5)
ERYTHROCYTE [DISTWIDTH] IN BLOOD BY AUTOMATED COUNT: 44.6 FL (ref 35–45)
ETHYL ALCOHOL, SERUM: < 0.01 %
GFR SERPL CREATININE-BSD FRML MDRD: 72 ML/MIN/1.73M2
GLUCOSE BLD-MCNC: 99 MG/DL (ref 70–108)
HCT VFR BLD CALC: 42 % (ref 37–47)
HEMOGLOBIN: 14.3 GM/DL (ref 12–16)
IMMATURE GRANS (ABS): 0.09 THOU/MM3 (ref 0–0.07)
IMMATURE GRANULOCYTES: 0.7 %
LYMPHOCYTES # BLD: 16.4 %
LYMPHOCYTES ABSOLUTE: 2.1 THOU/MM3 (ref 1–4.8)
MCH RBC QN AUTO: 28.1 PG (ref 26–33)
MCHC RBC AUTO-ENTMCNC: 34 GM/DL (ref 32.2–35.5)
MCV RBC AUTO: 82.7 FL (ref 81–99)
MONOCYTES # BLD: 8 %
MONOCYTES ABSOLUTE: 1 THOU/MM3 (ref 0.4–1.3)
NUCLEATED RED BLOOD CELLS: 0 /100 WBC
OPIATES, URINE: NEGATIVE
OSMOLALITY CALCULATION: 268 MOSMOL/KG (ref 275–300)
OXYCODONE: NEGATIVE
PHENCYCLIDINE QUANTITATIVE URINE: NEGATIVE
PLATELET # BLD: 461 THOU/MM3 (ref 130–400)
PMV BLD AUTO: 9.2 FL (ref 9.4–12.4)
POTASSIUM SERPL-SCNC: 4.3 MEQ/L (ref 3.5–5.2)
RBC # BLD: 5.08 MILL/MM3 (ref 4.2–5.4)
SALICYLATE, SERUM: < 0.3 MG/DL (ref 2–10)
SCAN OF BLOOD SMEAR: NORMAL
SEG NEUTROPHILS: 72.1 %
SEGMENTED NEUTROPHILS ABSOLUTE COUNT: 9 THOU/MM3 (ref 1.8–7.7)
SODIUM BLD-SCNC: 134 MEQ/L (ref 135–145)
TOTAL PROTEIN: 7.6 G/DL (ref 6.1–8)
TROPONIN T: < 0.01 NG/ML
TSH SERPL DL<=0.05 MIU/L-ACNC: 1.77 UIU/ML (ref 0.4–4.2)
WBC # BLD: 12.5 THOU/MM3 (ref 4.8–10.8)

## 2018-10-10 PROCEDURE — 82248 BILIRUBIN DIRECT: CPT

## 2018-10-10 PROCEDURE — 71045 X-RAY EXAM CHEST 1 VIEW: CPT

## 2018-10-10 PROCEDURE — 36415 COLL VENOUS BLD VENIPUNCTURE: CPT

## 2018-10-10 PROCEDURE — 6360000002 HC RX W HCPCS: Performed by: PSYCHIATRY & NEUROLOGY

## 2018-10-10 PROCEDURE — 84443 ASSAY THYROID STIM HORMONE: CPT

## 2018-10-10 PROCEDURE — 1240000000 HC EMOTIONAL WELLNESS R&B

## 2018-10-10 PROCEDURE — 84484 ASSAY OF TROPONIN QUANT: CPT

## 2018-10-10 PROCEDURE — G8482 FLU IMMUNIZE ORDER/ADMIN: HCPCS | Performed by: NUCLEAR MEDICINE

## 2018-10-10 PROCEDURE — G0480 DRUG TEST DEF 1-7 CLASSES: HCPCS

## 2018-10-10 PROCEDURE — 93010 ELECTROCARDIOGRAM REPORT: CPT | Performed by: INTERNAL MEDICINE

## 2018-10-10 PROCEDURE — G8417 CALC BMI ABV UP PARAM F/U: HCPCS | Performed by: NUCLEAR MEDICINE

## 2018-10-10 PROCEDURE — 99285 EMERGENCY DEPT VISIT HI MDM: CPT

## 2018-10-10 PROCEDURE — 85025 COMPLETE CBC W/AUTO DIFF WBC: CPT

## 2018-10-10 PROCEDURE — 80053 COMPREHEN METABOLIC PANEL: CPT

## 2018-10-10 PROCEDURE — 6370000000 HC RX 637 (ALT 250 FOR IP): Performed by: PSYCHIATRY & NEUROLOGY

## 2018-10-10 PROCEDURE — 3017F COLORECTAL CA SCREEN DOC REV: CPT | Performed by: NUCLEAR MEDICINE

## 2018-10-10 PROCEDURE — 99204 OFFICE O/P NEW MOD 45 MIN: CPT | Performed by: NUCLEAR MEDICINE

## 2018-10-10 PROCEDURE — 80307 DRUG TEST PRSMV CHEM ANLYZR: CPT

## 2018-10-10 PROCEDURE — 1111F DSCHRG MED/CURRENT MED MERGE: CPT | Performed by: NUCLEAR MEDICINE

## 2018-10-10 PROCEDURE — G8427 DOCREV CUR MEDS BY ELIG CLIN: HCPCS | Performed by: NUCLEAR MEDICINE

## 2018-10-10 PROCEDURE — 4004F PT TOBACCO SCREEN RCVD TLK: CPT | Performed by: NUCLEAR MEDICINE

## 2018-10-10 PROCEDURE — 93005 ELECTROCARDIOGRAM TRACING: CPT | Performed by: PHYSICIAN ASSISTANT

## 2018-10-10 RX ORDER — LEVOTHYROXINE SODIUM 0.05 MG/1
50 TABLET ORAL DAILY
Status: DISCONTINUED | OUTPATIENT
Start: 2018-10-11 | End: 2018-10-13 | Stop reason: HOSPADM

## 2018-10-10 RX ORDER — HALOPERIDOL 5 MG/ML
2 INJECTION INTRAMUSCULAR ONCE
Status: COMPLETED | OUTPATIENT
Start: 2018-10-10 | End: 2018-10-10

## 2018-10-10 RX ORDER — QUETIAPINE FUMARATE 100 MG/1
400 TABLET, FILM COATED ORAL NIGHTLY
Status: DISCONTINUED | OUTPATIENT
Start: 2018-10-10 | End: 2018-10-11

## 2018-10-10 RX ORDER — ACETAMINOPHEN 325 MG/1
650 TABLET ORAL EVERY 4 HOURS PRN
Status: DISCONTINUED | OUTPATIENT
Start: 2018-10-10 | End: 2018-10-13 | Stop reason: HOSPADM

## 2018-10-10 RX ORDER — TRAZODONE HYDROCHLORIDE 50 MG/1
50 TABLET ORAL NIGHTLY PRN
Status: DISCONTINUED | OUTPATIENT
Start: 2018-10-10 | End: 2018-10-13 | Stop reason: HOSPADM

## 2018-10-10 RX ORDER — HYDROXYZINE PAMOATE 50 MG/1
50 CAPSULE ORAL 3 TIMES DAILY PRN
Status: DISCONTINUED | OUTPATIENT
Start: 2018-10-10 | End: 2018-10-13 | Stop reason: HOSPADM

## 2018-10-10 RX ORDER — PANTOPRAZOLE SODIUM 40 MG/1
40 TABLET, DELAYED RELEASE ORAL
Status: DISCONTINUED | OUTPATIENT
Start: 2018-10-11 | End: 2018-10-13 | Stop reason: HOSPADM

## 2018-10-10 RX ORDER — LORAZEPAM 2 MG/ML
0.5 INJECTION INTRAMUSCULAR ONCE
Status: COMPLETED | OUTPATIENT
Start: 2018-10-10 | End: 2018-10-10

## 2018-10-10 RX ORDER — NICOTINE 21 MG/24HR
1 PATCH, TRANSDERMAL 24 HOURS TRANSDERMAL DAILY
Status: DISCONTINUED | OUTPATIENT
Start: 2018-10-11 | End: 2018-10-13 | Stop reason: HOSPADM

## 2018-10-10 RX ADMIN — LORAZEPAM 0.5 MG: 2 INJECTION INTRAMUSCULAR; INTRAVENOUS at 23:16

## 2018-10-10 RX ADMIN — QUETIAPINE FUMARATE 400 MG: 100 TABLET ORAL at 22:08

## 2018-10-10 RX ADMIN — METOPROLOL TARTRATE 12.5 MG: 25 TABLET ORAL at 22:09

## 2018-10-10 RX ADMIN — HYDROXYZINE PAMOATE 50 MG: 50 CAPSULE ORAL at 22:10

## 2018-10-10 RX ADMIN — TRAZODONE HYDROCHLORIDE 50 MG: 50 TABLET ORAL at 22:10

## 2018-10-10 RX ADMIN — HALOPERIDOL LACTATE 2 MG: 5 INJECTION, SOLUTION INTRAMUSCULAR at 23:23

## 2018-10-10 ASSESSMENT — ENCOUNTER SYMPTOMS
DIARRHEA: 0
CONSTIPATION: 0
VOMITING: 0
SORE THROAT: 0
EYE DISCHARGE: 0
WHEEZING: 0
RHINORRHEA: 0
BACK PAIN: 0
RECTAL PAIN: 0
EYE REDNESS: 0
CHEST TIGHTNESS: 1
ABDOMINAL PAIN: 0
SHORTNESS OF BREATH: 0
COUGH: 0
PHOTOPHOBIA: 0
CONSTIPATION: 0
BLOOD IN STOOL: 0
COLOR CHANGE: 0
ANAL BLEEDING: 0
SHORTNESS OF BREATH: 1
VOMITING: 0
ABDOMINAL DISTENTION: 0
NAUSEA: 0
ABDOMINAL PAIN: 0
BACK PAIN: 0
DIARRHEA: 0
NAUSEA: 0

## 2018-10-10 ASSESSMENT — SLEEP AND FATIGUE QUESTIONNAIRES
DIFFICULTY ARISING: NO
RESTFUL SLEEP: NO
AVERAGE NUMBER OF SLEEP HOURS: 3
DIFFICULTY STAYING ASLEEP: YES
DO YOU USE A SLEEP AID: YES
DO YOU USE A SLEEP AID: YES
SLEEP PATTERN: DISTURBED/INTERRUPTED SLEEP
DO YOU HAVE DIFFICULTY SLEEPING: YES
DIFFICULTY FALLING ASLEEP: YES
DO YOU HAVE DIFFICULTY SLEEPING: YES

## 2018-10-10 ASSESSMENT — LIFESTYLE VARIABLES
HISTORY_ALCOHOL_USE: NO
HISTORY_ALCOHOL_USE: NO

## 2018-10-10 ASSESSMENT — PAIN SCALES - GENERAL: PAINLEVEL_OUTOF10: 0

## 2018-10-10 ASSESSMENT — PATIENT HEALTH QUESTIONNAIRE - PHQ9: SUM OF ALL RESPONSES TO PHQ QUESTIONS 1-9: 3

## 2018-10-11 PROBLEM — F25.0 SCHIZOAFFECTIVE DISORDER, BIPOLAR TYPE (HCC): Status: ACTIVE | Noted: 2018-10-11

## 2018-10-11 PROBLEM — F31.9 BIPOLAR 1 DISORDER, DEPRESSED (HCC): Status: ACTIVE | Noted: 2018-10-11

## 2018-10-11 PROCEDURE — 6370000000 HC RX 637 (ALT 250 FOR IP): Performed by: PSYCHIATRY & NEUROLOGY

## 2018-10-11 PROCEDURE — 1240000000 HC EMOTIONAL WELLNESS R&B

## 2018-10-11 PROCEDURE — 99222 1ST HOSP IP/OBS MODERATE 55: CPT | Performed by: PSYCHIATRY & NEUROLOGY

## 2018-10-11 RX ADMIN — QUETIAPINE FUMARATE 500 MG: 100 TABLET ORAL at 21:14

## 2018-10-11 RX ADMIN — METOPROLOL TARTRATE 12.5 MG: 25 TABLET ORAL at 09:21

## 2018-10-11 RX ADMIN — PANTOPRAZOLE SODIUM 40 MG: 40 TABLET, DELAYED RELEASE ORAL at 09:21

## 2018-10-11 RX ADMIN — TRAZODONE HYDROCHLORIDE 50 MG: 50 TABLET ORAL at 21:14

## 2018-10-11 RX ADMIN — HYDROXYZINE PAMOATE 50 MG: 50 CAPSULE ORAL at 14:21

## 2018-10-11 RX ADMIN — SERTRALINE 50 MG: 50 TABLET, FILM COATED ORAL at 09:21

## 2018-10-11 RX ADMIN — LEVOTHYROXINE SODIUM 50 MCG: 50 TABLET ORAL at 07:46

## 2018-10-11 RX ADMIN — HYDROXYZINE PAMOATE 50 MG: 50 CAPSULE ORAL at 20:09

## 2018-10-11 RX ADMIN — METOPROLOL TARTRATE 12.5 MG: 25 TABLET ORAL at 21:15

## 2018-10-11 ASSESSMENT — SLEEP AND FATIGUE QUESTIONNAIRES
DIFFICULTY STAYING ASLEEP: YES
DIFFICULTY FALLING ASLEEP: YES
DO YOU HAVE DIFFICULTY SLEEPING: YES
RESTFUL SLEEP: NO
AVERAGE NUMBER OF SLEEP HOURS: 3
DIFFICULTY ARISING: NO
DO YOU USE A SLEEP AID: YES
SLEEP PATTERN: DISTURBED/INTERRUPTED SLEEP

## 2018-10-11 ASSESSMENT — PAIN SCALES - GENERAL: PAINLEVEL_OUTOF10: 0

## 2018-10-11 NOTE — FLOWSHEET NOTE
Patient has other siblings who she was able to speak with prior to coming into the facility. She didn't disclose if she has a [employer] or treasure community. During conversation, she did swear but promptly apologized so she is aware of her language and others feelings when possibly offensive.   - Patient did not appear to be coping very well with her situation during our visit. The team will continue to work with her in order to get her meds regulated and her mood controlled. Plan: Continued support regarding encouragement of her decisions to stand up for herself would be helpful.

## 2018-10-12 PROCEDURE — 99232 SBSQ HOSP IP/OBS MODERATE 35: CPT | Performed by: PSYCHIATRY & NEUROLOGY

## 2018-10-12 PROCEDURE — 6370000000 HC RX 637 (ALT 250 FOR IP): Performed by: PSYCHIATRY & NEUROLOGY

## 2018-10-12 PROCEDURE — APPSS30 APP SPLIT SHARED TIME 16-30 MINUTES: Performed by: NURSE PRACTITIONER

## 2018-10-12 PROCEDURE — 1240000000 HC EMOTIONAL WELLNESS R&B

## 2018-10-12 RX ORDER — BENZTROPINE MESYLATE 1 MG/1
1 TABLET ORAL 2 TIMES DAILY
Status: DISCONTINUED | OUTPATIENT
Start: 2018-10-12 | End: 2018-10-12

## 2018-10-12 RX ADMIN — QUETIAPINE FUMARATE 500 MG: 100 TABLET ORAL at 22:01

## 2018-10-12 RX ADMIN — TRAZODONE HYDROCHLORIDE 50 MG: 50 TABLET ORAL at 22:02

## 2018-10-12 RX ADMIN — LEVOTHYROXINE SODIUM 50 MCG: 50 TABLET ORAL at 06:44

## 2018-10-12 RX ADMIN — SERTRALINE 50 MG: 50 TABLET, FILM COATED ORAL at 08:09

## 2018-10-12 RX ADMIN — METOPROLOL TARTRATE 12.5 MG: 25 TABLET ORAL at 08:09

## 2018-10-12 RX ADMIN — HYDROXYZINE PAMOATE 50 MG: 50 CAPSULE ORAL at 16:35

## 2018-10-12 RX ADMIN — PANTOPRAZOLE SODIUM 40 MG: 40 TABLET, DELAYED RELEASE ORAL at 08:09

## 2018-10-12 RX ADMIN — HYDROXYZINE PAMOATE 50 MG: 50 CAPSULE ORAL at 08:09

## 2018-10-12 RX ADMIN — METOPROLOL TARTRATE 12.5 MG: 25 TABLET ORAL at 22:01

## 2018-10-12 ASSESSMENT — PAIN SCALES - GENERAL
PAINLEVEL_OUTOF10: 0
PAINLEVEL_OUTOF10: 0

## 2018-10-12 NOTE — PLAN OF CARE
Problem: Social interaction  Goal: Increased social interaction  Outcome: Met This Shift  Patient has attended all of the groups this shift so far and has also been out of her room for socialization with others all day so she has met her socialization goal for today.

## 2018-10-12 NOTE — BH NOTE
Group Therapy Note    Date: 10/11/2018  Start Time:  1000  End Time:   1100  Number of Participants: 10    Type of Group: Recreational/Coping Skills    Patient's Goal:  To increase socialization and knowledge of positive coping skills. Notes:  Social with others. Patient was able to identify some positive coping skills.      Status After Intervention:  Improved    Participation Level: Interactive    Participation Quality: Appropriate, Attentive and Sharing      Speech:  normal      Thought Process/Content: Logical      Affective Functioning: Congruent      Mood: euthymic      Level of consciousness:  Oriented x4      Response to Learning: Able to verbalize current knowledge/experience, Able to verbalize/acknowledge new learning, Able to retain information, Capable of insight, Able to change behavior and Progressing to goal      Endings: None Reported    Modes of Intervention: Education, Socialization, Exploration and Activity      Discipline Responsible: Psychoeducational Specialist      Signature:  Tom Abel
Group Therapy Note    Date: 10/12/2018  Start Time:  1000  End Time:    1100  Number of Participants:    6    Type of Group:   Recreational/Social Group    Patient's Goal:    Increase socialization and concentration. Notes:   Patient was social with others. Patient actively participated in decorating a pumpkin for Halloween.      Status After Intervention:  Improved    Participation Level: Interactive    Participation Quality: Appropriate, Attentive and Sharing      Speech:  normal      Thought Process/Content: Logical      Affective Functioning: Congruent      Mood: euthymic      Level of consciousness:  Alert and Attentive      Response to Learning: Able to verbalize current knowledge/experience and Progressing to goal      Endings: None Reported    Modes of Intervention: Socialization and Activity      Discipline Responsible: Psychoeducational Specialist      Signature:  Kaela Shore
times.  MOBILITY:  Patient has a walker but does not need assist. Can be unsteady at times. GOALS:   Increase socialization by attending groups on the unit daily and coming out of her room more often for social interaction with others.

## 2018-10-13 VITALS
OXYGEN SATURATION: 97 % | RESPIRATION RATE: 16 BRPM | WEIGHT: 170 LBS | DIASTOLIC BLOOD PRESSURE: 62 MMHG | HEIGHT: 63 IN | TEMPERATURE: 98.4 F | SYSTOLIC BLOOD PRESSURE: 99 MMHG | HEART RATE: 81 BPM | BODY MASS INDEX: 30.12 KG/M2

## 2018-10-13 LAB — GLUCOSE BLD-MCNC: 102 MG/DL (ref 70–108)

## 2018-10-13 PROCEDURE — 82948 REAGENT STRIP/BLOOD GLUCOSE: CPT

## 2018-10-13 PROCEDURE — 99239 HOSP IP/OBS DSCHRG MGMT >30: CPT | Performed by: PSYCHIATRY & NEUROLOGY

## 2018-10-13 PROCEDURE — 6370000000 HC RX 637 (ALT 250 FOR IP): Performed by: PSYCHIATRY & NEUROLOGY

## 2018-10-13 PROCEDURE — 5130000000 HC BRIDGE APPOINTMENT

## 2018-10-13 RX ORDER — QUETIAPINE FUMARATE 100 MG/1
500 TABLET, FILM COATED ORAL NIGHTLY
Qty: 30 TABLET | Refills: 0 | Status: ON HOLD | OUTPATIENT
Start: 2018-10-13 | End: 2018-10-16 | Stop reason: CLARIF

## 2018-10-13 RX ORDER — HYDROXYZINE PAMOATE 25 MG/1
25 CAPSULE ORAL 3 TIMES DAILY PRN
Qty: 30 CAPSULE | Refills: 0 | Status: ON HOLD | OUTPATIENT
Start: 2018-10-13 | End: 2018-10-18

## 2018-10-13 RX ADMIN — LEVOTHYROXINE SODIUM 50 MCG: 50 TABLET ORAL at 06:57

## 2018-10-13 RX ADMIN — METOPROLOL TARTRATE 12.5 MG: 25 TABLET ORAL at 08:20

## 2018-10-13 RX ADMIN — HYDROXYZINE PAMOATE 50 MG: 50 CAPSULE ORAL at 10:44

## 2018-10-13 RX ADMIN — SERTRALINE 50 MG: 50 TABLET, FILM COATED ORAL at 08:19

## 2018-10-13 RX ADMIN — PANTOPRAZOLE SODIUM 40 MG: 40 TABLET, DELAYED RELEASE ORAL at 08:21

## 2018-10-13 ASSESSMENT — PAIN SCALES - GENERAL: PAINLEVEL_OUTOF10: 0

## 2018-10-13 ASSESSMENT — PAIN DESCRIPTION - PROGRESSION: CLINICAL_PROGRESSION: GRADUALLY IMPROVING

## 2018-10-13 NOTE — DISCHARGE INSTR - DIET

## 2018-10-13 NOTE — PROGRESS NOTES
10/13/18  12:48 PM    SHU faxed patient's AVS to [employer] at [phone] and [address] [place] [address] at [phone]. SHU also faxed order for home health care to [facility] at [phone]. At discharge patient was provided with a letter from Dr. Rand Carias indicated it would be in her best interest to have her apartment changed to a different location.
24 hr chart review completed
Topic Goal Wrap Up/Relaxation    Start 2000    End 2020    Participation yes      Response active      Behavior appropriate
No  Attention: Hyperalert  Thought Processes: Circumstantial  Thought Content:Normal: Yes  Thought Content: Paranoia, Preoccupations  Hallucinations: None (patient denies)  Delusions: Yes  Delusions: Persecution, Obsessions  Memory:Normal: No  Memory: Confabulation  Insight and Judgment: No  Insight and Judgment: Poor Judgment, Poor Insight, Unrealistic  Present Suicidal Ideation: No  Present Homicidal Ideation: No    Pt admitted with followings belongings:  Dentures: Uppers  Vision - Corrective Lenses: Glasses  Hearing Aid: None  Jewelry: Bracelet (in her purse)  Body Piercings Removed: N/A  Clothing: Undergarments (Comment), Shirt, Pants  Were All Patient Medications Collected?: Yes (envelope # [id number])  Other Valuables: Money (Comment), Purse, Wallet, Other (Comment), Keys (check book; $10.35 in change;lighters and loose tobacco)     Admission order obtained Yes  Valuables sent home with No Valuables placed in safe in security envelope, number:  [id number]. Patient's home medications were locked in the narcotic cabinet envelope [id number] . Patient oriented to surroundings and program expectations and copy of patient rights given. Received admission packet:  Yes. Consents reviewed, signed Yes. \"An Important Message from [employer] About Your Rights\" form reviewed, signed Yes. Refused No. Patient verbalize understanding:  Yesa. Patient education on precautions: Yes         Provided pt with [employer] handout entitled \"Quitting Smoking. \"  Reviewed handout with pt addressing dangers of smoking, developing coping skills, and providing basic information about quitting. Pt response to counseling:  Patient is requesting a nicotine patch ;patient is not ready to quit smoking. This is a 58year old - female admitted to the Adult Behavioral Unit for treatment of Schizoaffective Disorder. The patient was accompanied to the unit per ED staff personnel and a  via wheelchair. It was reported

## 2018-10-13 NOTE — DISCHARGE SUMMARY
Provider Discharge Summary     Patient ID:  Kathryn Leo  [id number]  61 y.o.  1955    Admit date: 10/10/2018    Discharge date and time: 10/13/2018  8:38 AM     Admitting Physician: Amanda Cool MD     Discharge Physician: Amanda Cool MD    Admission Diagnoses: Schizoaffective disorder, bipolar type ([facility] [F25.0]    Discharge Diagnoses:      Schizoaffective disorder, bipolar type [facility])     Patient Active Problem List   Diagnosis Code    COPD without exacerbation ([facility] J44.9    Weakness of both lower limbs R29.898    Insomnia G47.00    Chest pain R07.9    Sinus tachycardia R00.0    MS (multiple sclerosis) ([facility] G35    Chronic back pain M54.9, G89.29    Cannabis abuse F12.10    Osteoporosis M81.0    History of seizures Z87.898    Smoker F17.200    Polypharmacy Z79.899    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    Recurrent falls R29.6    Gastroesophageal reflux disease K21.9    Schizoaffective disorder, depressive type (HCC) F25.1    Chronic bilateral low back pain without sciatica M54.5, G89.29    DDD (degenerative disc disease), lumbar M51.36    Altered mental status, unspecified R41.82    Confusion and disorientation F99    Leukocytosis D72.829    Leukocytosis D72.829    Low grade fever R50.9    Urinary retention R33.9    Azotemia R79.89    Microcytosis R71.8    Thrombocytosis ([facility] D47.3    Essential hypertension I10    Hypothyroidism E03.9    Depression, major, recurrent (HCC) F33.9    Schizoaffective disorder (HCC) F25.9    Schizoaffective disorder, bipolar type ([facility] F25.0        Admission Condition: poor    Discharged Condition: stable    Indication for Admission: threat to self    History of Present Illnes (present tense wording is of findings from admission exam and are not necessarily indicative of current findings):   Kathryn Leo is a 58 y.o. female with a history of Schizoaffective disorder who presented to the emergency department ED via

## 2018-10-13 NOTE — PLAN OF CARE
Problem: Altered Mood, Manic Behavior:  Goal: Able to sleep  Able to sleep   Outcome: Completed Date Met: 10/13/18  Slept 7 hours last evening. Requested trazodone at bedtime. Goal: Able to verbalize decrease in frequency and intensity of racing thoughts  Able to verbalize decrease in frequency and intensity of racing thoughts   Outcome: Completed Date Met: 10/13/18  Denies racing thoughts this shift. Goal: Ability to disclose and discuss suicidal ideas will improve  Ability to disclose and discuss suicidal ideas will improve   Outcome: Met This Shift  Denies suicidal ideations. Goal: Ability to demonstrate self-control will improve  Ability to demonstrate self-control will improve   Outcome: Completed Date Met: 10/13/18    Goal: Mood stable  Mood stable   Outcome: Met This Shift  Rates her mood an 10 with 10 being the best. Affect bright. Good eye contact. States she is hopeful for the future. Good peer interaction. Problem: Altered Mood, Psychotic Behavior:  Goal: Able to demonstrate trust by eating, participating in treatment and following staff's direction  Able to demonstrate trust by eating, participating in treatment and following staff's direction   Outcome: Completed Date Met: 10/13/18  Eating well while on unit. Participating in treatment and follows directions. Goal: Able to verbalize decrease in frequency and intensity of hallucinations  Able to verbalize decrease in frequency and intensity of hallucinations   Outcome: Met This Shift  Denies hallucinations this shift. Goal: Able to verbalize reality based thinking  Able to verbalize reality based thinking   Outcome: Completed Date Met: 10/13/18  Alert and oriented x 4. Goal: Absence of self-harm  Absence of self-harm   Outcome: Met This Shift  Pt remains safe and free from harm.   Goal: Ability to achieve adequate nutritional intake will improve  Ability to achieve adequate nutritional intake will improve   Outcome: Met This Shift  Eating

## 2018-10-14 ENCOUNTER — NURSE TRIAGE (OUTPATIENT)
Dept: ADMINISTRATIVE | Age: 63
End: 2018-10-14

## 2018-10-14 NOTE — TELEPHONE ENCOUNTER
4 E Discharge call back. Did not get perscriptions  Filled yet. Has to call for appointment for follow up. Swill do both tomorrow. Remains at home. Seems calm.

## 2018-10-15 ENCOUNTER — HOSPITAL ENCOUNTER (INPATIENT)
Age: 63
LOS: 3 days | Discharge: HOME | DRG: 885 | End: 2018-10-18
Attending: PSYCHIATRY & NEUROLOGY | Admitting: PSYCHIATRY & NEUROLOGY
Payer: MEDICARE

## 2018-10-15 DIAGNOSIS — F25.9 SCHIZOAFFECTIVE DISORDER, UNSPECIFIED TYPE (HCC): Primary | ICD-10-CM

## 2018-10-15 LAB
ACETAMINOPHEN LEVEL: < 5 UG/ML (ref 0–20)
ALBUMIN SERPL-MCNC: 4.1 G/DL (ref 3.5–5.1)
ALP BLD-CCNC: 110 U/L (ref 38–126)
ALT SERPL-CCNC: 11 U/L (ref 11–66)
AMPHETAMINE+METHAMPHETAMINE URINE SCREEN: NEGATIVE
ANION GAP SERPL CALCULATED.3IONS-SCNC: 15 MEQ/L (ref 8–16)
AST SERPL-CCNC: 13 U/L (ref 5–40)
ATYPICAL LYMPHOCYTES: ABNORMAL %
BACTERIA: ABNORMAL /HPF
BARBITURATE QUANTITATIVE URINE: NEGATIVE
BASOPHILS # BLD: 1.1 %
BASOPHILS ABSOLUTE: 0.1 THOU/MM3 (ref 0–0.1)
BENZODIAZEPINE QUANTITATIVE URINE: NEGATIVE
BILIRUB SERPL-MCNC: 0.5 MG/DL (ref 0.3–1.2)
BILIRUBIN DIRECT: < 0.2 MG/DL (ref 0–0.3)
BILIRUBIN URINE: NEGATIVE
BLOOD, URINE: NEGATIVE
BUN BLDV-MCNC: 16 MG/DL (ref 7–22)
CALCIUM SERPL-MCNC: 10.3 MG/DL (ref 8.5–10.5)
CANNABINOID QUANTITATIVE URINE: NEGATIVE
CASTS UA: ABNORMAL /LPF
CHARACTER, URINE: ABNORMAL
CHLORIDE BLD-SCNC: 97 MEQ/L (ref 98–111)
CO2: 21 MEQ/L (ref 23–33)
COCAINE METABOLITE QUANTITATIVE URINE: NEGATIVE
COLOR: YELLOW
CREAT SERPL-MCNC: 0.7 MG/DL (ref 0.4–1.2)
EOSINOPHIL # BLD: 2.5 %
EOSINOPHILS ABSOLUTE: 0.3 THOU/MM3 (ref 0–0.4)
EPITHELIAL CELLS, UA: ABNORMAL /HPF
ERYTHROCYTE [DISTWIDTH] IN BLOOD BY AUTOMATED COUNT: 14.6 % (ref 11.5–14.5)
ERYTHROCYTE [DISTWIDTH] IN BLOOD BY AUTOMATED COUNT: 44.4 FL (ref 35–45)
ETHYL ALCOHOL, SERUM: < 0.01 %
GFR SERPL CREATININE-BSD FRML MDRD: 85 ML/MIN/1.73M2
GLUCOSE BLD-MCNC: 97 MG/DL (ref 70–108)
GLUCOSE URINE: NEGATIVE MG/DL
HCT VFR BLD CALC: 42.2 % (ref 37–47)
HEMOGLOBIN: 14.1 GM/DL (ref 12–16)
IMMATURE GRANS (ABS): 0.04 THOU/MM3 (ref 0–0.07)
IMMATURE GRANULOCYTES: 0.4 %
KETONES, URINE: NEGATIVE
LEUKOCYTE ESTERASE, URINE: NEGATIVE
LYMPHOCYTES # BLD: 21 %
LYMPHOCYTES ABSOLUTE: 2.3 THOU/MM3 (ref 1–4.8)
MCH RBC QN AUTO: 28 PG (ref 26–33)
MCHC RBC AUTO-ENTMCNC: 33.4 GM/DL (ref 32.2–35.5)
MCV RBC AUTO: 83.7 FL (ref 81–99)
MONOCYTES # BLD: 9.7 %
MONOCYTES ABSOLUTE: 1.1 THOU/MM3 (ref 0.4–1.3)
NITRITE, URINE: NEGATIVE
NUCLEATED RED BLOOD CELLS: 0 /100 WBC
OPIATES, URINE: NEGATIVE
OSMOLALITY CALCULATION: 267.5 MOSMOL/KG (ref 275–300)
OXYCODONE: NEGATIVE
PH UA: 6.5
PHENCYCLIDINE QUANTITATIVE URINE: NEGATIVE
PLATELET # BLD: 438 THOU/MM3 (ref 130–400)
PMV BLD AUTO: 9.4 FL (ref 9.4–12.4)
POTASSIUM SERPL-SCNC: 4 MEQ/L (ref 3.5–5.2)
PROTEIN UA: NEGATIVE
RBC # BLD: 5.04 MILL/MM3 (ref 4.2–5.4)
RBC URINE: ABNORMAL /HPF
SALICYLATE, SERUM: < 0.3 MG/DL (ref 2–10)
SCAN OF BLOOD SMEAR: NORMAL
SEG NEUTROPHILS: 65.3 %
SEGMENTED NEUTROPHILS ABSOLUTE COUNT: 7.2 THOU/MM3 (ref 1.8–7.7)
SODIUM BLD-SCNC: 133 MEQ/L (ref 135–145)
SPECIFIC GRAVITY, URINE: 1.01 (ref 1–1.03)
TOTAL PROTEIN: 7.6 G/DL (ref 6.1–8)
UROBILINOGEN, URINE: 0.2 EU/DL
WBC # BLD: 11 THOU/MM3 (ref 4.8–10.8)
WBC UA: ABNORMAL /HPF
YEAST: ABNORMAL

## 2018-10-15 PROCEDURE — 87086 URINE CULTURE/COLONY COUNT: CPT

## 2018-10-15 PROCEDURE — 82248 BILIRUBIN DIRECT: CPT

## 2018-10-15 PROCEDURE — 36415 COLL VENOUS BLD VENIPUNCTURE: CPT

## 2018-10-15 PROCEDURE — G0480 DRUG TEST DEF 1-7 CLASSES: HCPCS

## 2018-10-15 PROCEDURE — 81001 URINALYSIS AUTO W/SCOPE: CPT

## 2018-10-15 PROCEDURE — 85025 COMPLETE CBC W/AUTO DIFF WBC: CPT

## 2018-10-15 PROCEDURE — 1240000000 HC EMOTIONAL WELLNESS R&B

## 2018-10-15 PROCEDURE — 80053 COMPREHEN METABOLIC PANEL: CPT

## 2018-10-15 PROCEDURE — 80307 DRUG TEST PRSMV CHEM ANLYZR: CPT

## 2018-10-15 PROCEDURE — 99285 EMERGENCY DEPT VISIT HI MDM: CPT

## 2018-10-15 RX ORDER — TRAZODONE HYDROCHLORIDE 50 MG/1
50 TABLET ORAL NIGHTLY PRN
Status: DISCONTINUED | OUTPATIENT
Start: 2018-10-15 | End: 2018-10-18 | Stop reason: HOSPADM

## 2018-10-15 RX ORDER — ACETAMINOPHEN 325 MG/1
650 TABLET ORAL EVERY 4 HOURS PRN
Status: DISCONTINUED | OUTPATIENT
Start: 2018-10-15 | End: 2018-10-18 | Stop reason: HOSPADM

## 2018-10-15 RX ORDER — HYDROXYZINE PAMOATE 50 MG/1
50 CAPSULE ORAL 3 TIMES DAILY PRN
Status: DISCONTINUED | OUTPATIENT
Start: 2018-10-15 | End: 2018-10-18 | Stop reason: HOSPADM

## 2018-10-15 ASSESSMENT — SLEEP AND FATIGUE QUESTIONNAIRES
DIFFICULTY FALLING ASLEEP: YES
DIFFICULTY STAYING ASLEEP: YES
DO YOU USE A SLEEP AID: YES
RESTFUL SLEEP: NO
AVERAGE NUMBER OF SLEEP HOURS: 4
DO YOU HAVE DIFFICULTY SLEEPING: YES
SLEEP PATTERN: DIFFICULTY FALLING ASLEEP;DISTURBED/INTERRUPTED SLEEP;EARLY AWAKENING;RESTLESSNESS;NIGHTMARES/TERRORS
DIFFICULTY ARISING: NO

## 2018-10-15 ASSESSMENT — ENCOUNTER SYMPTOMS
NAUSEA: 0
SHORTNESS OF BREATH: 0
ABDOMINAL PAIN: 0
BACK PAIN: 0
EYE REDNESS: 0
SORE THROAT: 0
RHINORRHEA: 0
DIARRHEA: 0
COUGH: 0
WHEEZING: 0
VOMITING: 0
CONSTIPATION: 0
COLOR CHANGE: 0
EYE DISCHARGE: 0

## 2018-10-15 ASSESSMENT — PAIN DESCRIPTION - LOCATION: LOCATION: ABDOMEN

## 2018-10-15 ASSESSMENT — PATIENT HEALTH QUESTIONNAIRE - PHQ9: SUM OF ALL RESPONSES TO PHQ QUESTIONS 1-9: 5

## 2018-10-15 ASSESSMENT — LIFESTYLE VARIABLES: HISTORY_ALCOHOL_USE: NO

## 2018-10-15 ASSESSMENT — PAIN SCALES - GENERAL: PAINLEVEL_OUTOF10: 0

## 2018-10-16 LAB
ORGANISM: ABNORMAL
URINE CULTURE REFLEX: ABNORMAL

## 2018-10-16 PROCEDURE — 6370000000 HC RX 637 (ALT 250 FOR IP): Performed by: PHYSICIAN ASSISTANT

## 2018-10-16 PROCEDURE — 1240000000 HC EMOTIONAL WELLNESS R&B

## 2018-10-16 PROCEDURE — 99222 1ST HOSP IP/OBS MODERATE 55: CPT | Performed by: PSYCHIATRY & NEUROLOGY

## 2018-10-16 PROCEDURE — 6370000000 HC RX 637 (ALT 250 FOR IP): Performed by: NURSE PRACTITIONER

## 2018-10-16 PROCEDURE — APPSS45 APP SPLIT SHARED TIME 31-45 MINUTES: Performed by: PHYSICIAN ASSISTANT

## 2018-10-16 RX ORDER — QUETIAPINE FUMARATE 100 MG/1
400 TABLET, FILM COATED ORAL NIGHTLY
Status: ON HOLD | COMMUNITY
End: 2018-10-18

## 2018-10-16 RX ORDER — TRAZODONE HYDROCHLORIDE 50 MG/1
50 TABLET ORAL NIGHTLY PRN
Status: ON HOLD | COMMUNITY
End: 2018-10-18

## 2018-10-16 RX ADMIN — HYDROXYZINE PAMOATE 50 MG: 50 CAPSULE ORAL at 14:31

## 2018-10-16 RX ADMIN — HYDROXYZINE PAMOATE 50 MG: 50 CAPSULE ORAL at 20:24

## 2018-10-16 RX ADMIN — TRAZODONE HYDROCHLORIDE 50 MG: 50 TABLET ORAL at 20:24

## 2018-10-16 RX ADMIN — QUETIAPINE FUMARATE 500 MG: 100 TABLET ORAL at 20:24

## 2018-10-16 RX ADMIN — SERTRALINE 50 MG: 50 TABLET, FILM COATED ORAL at 12:30

## 2018-10-16 RX ADMIN — ACETAMINOPHEN 650 MG: 325 TABLET ORAL at 17:58

## 2018-10-16 ASSESSMENT — PAIN SCALES - GENERAL
PAINLEVEL_OUTOF10: 4
PAINLEVEL_OUTOF10: 1
PAINLEVEL_OUTOF10: 0

## 2018-10-16 NOTE — PROGRESS NOTES
10/16/18 10:44 AM     faxed clinical information to CSU for review, will await decision.      GURPREET Dawn

## 2018-10-16 NOTE — PROGRESS NOTES
Group Therapy Note     Date: 10/16/2018  Start Time: 1330  End Time:  4096  Number of Participants: 6     Type of Group: Psychoeducation     Notes:  Patient present in group. Patient active in group discussion regarding stress management. Patient able to identify situations that cause high stress. Developed and discussed coping skills to improve management of stress. Patient somewhat monopolizing in group, talking loudly. Loosely able to relate to group conversation. Patient making vague comments about feeling safe on the unit. Patient has become increasingly paranoid throughout the day. Patient verbalized to SW after group that she feels as if two other patient's on the unit are trying to intimidate her to get information out of her. Patient states \"I know what's going on here, I know a lot more than you know, there's more than one of them, they are everywhere. They do everything I do, when I go to group, they do, when I get up they get up. \" SW informed patient that all patient's are suppose to attend group. SW reminded patient that she is in fact safe here on the unit. SW offered to speak with other patient's to discuss patient's concerns, patient declined this. SW informed patient's primary RN, Anushka of this.      Status After Intervention:  Decompensated     Participation Level:  Active Listener and Interactive, Somewhat Monopolizing      Participation Quality: Attentive, Sharing and Supportive        Speech:  Normal        Thought Process/Content: Linear, Paranoid        Affective Functioning: Congruent        Mood: Anxious        Level of consciousness:  Alert, Oriented x4 and Attentive        Response to Learning: Able to verbalize current knowledge/experience.       Endings: None Reported     Modes of Intervention: Education, Support, Socialization, Exploration, Clarifying and Problem-solving        Discipline Responsible: /Counselor        Signature:  GURPREET Grande

## 2018-10-16 NOTE — BH NOTE
INPATIENT RECREATIONAL THERAPY  ADULT BEHAVIORAL SERVICES  EVALUATION    REFERRING PHYSICIAN:   Dr. Ronen Godfrey  DIAGNOSIS:    Schizoaffective Disorder, Unspecified  PRECAUTIONS:   Standard precautions    HISTORY OF PRESENT ILLNESS/INJURY:    Patient was admitted to the unit due to an increase in aggressive behavior and paranoia. Patient has been reported to also be having some delusional thoughts. Patient was brought in to the hospital from the [place] [address] due to having thoughts that people there were going to harm her. Patient also stated that her phone was being hacked and she wanted the staff at the [place] [address] to contact the [address] [place] [address] and report it. The CSU staff reported that patient was threatening them prior to admission. Patient appeared cooperative and pleasant but appeared to have some anxiety at time of evaluation. PMH:  Please see medical chart for prior medical history, allergies, and medication    HISTORY OF PSYCHIATRIC TREATMENT:  IP: [facility]  OP: Unruly Munoz OF BIRTH: [birth year]  GENDER: Female  MARITAL STATUS:   EMPLOYMENT: Unemployed  LIVING SITUATION:  Patient lives alone in an apartment. EDUCATION:  Completed the 11th grade    MEDICATION/DRUG USE:  History of alcohol abuse - patient has a history of x2 DUI's. History of marijuana use. History of noncompliance with her medications. LEISURE INTERESTS:  Walking, watching TV/Movies, pet care, playing cards, playing games, listening to music. Coloring and other arts/crafts    ACTIVITY PREFERENCE: Small group  ACTIVITY TYPES: Passive. Active. Indoor. Outdoor. COGNITION:  A&0x3    COPING: poor  ATTENTION: fair to poor    RELAXATION:  Patient appeared anxious. Patient reported poor sleep and paranoia. SELF-ESTEEM:  fair  MOTIVATION:   poor    SOCIAL SKILLS:   Fair - Patient reported some paranoia and delusional thoughts. FRUSTRATION TOLERANCE:   Patient demonstrated aggressive behavior toward staff at the [place] [address] prior to admission.    ATTENTION SEEKING:   Patient is attention seeking at times. COOPERATION:  Cooperative but was arguing with others and threatening staff at the [facility] prior to admission. AFFECT:   bright  APPEARANCE:   disheveled    HEARING:   No problems noted  VISION:  Patient uses glasses for reading. VERBAL COMMUNICATION:    No problems  WRITTEN COMMUNICATION:   No problems noted    COORDINATION:   Patient has chronic back pain. Patient has used a walker in the past. Patient has a history of COPD, DDD, MS and a history of seizures. MOBILITY:  Ambulates independently     GOALS:   Increase socialization by attending groups on the unit daily and coming out of her room for socialization with others during her hospital stay.

## 2018-10-16 NOTE — ED PROVIDER NOTES
[facility]  eMERGENCY dEPARTMENT eNCOUnter          CHIEF COMPLAINT       Chief Complaint   Patient presents with    Aggressive Behavior       Nurses Notes reviewed and I agree except as noted in the HPI. HISTORY OF PRESENT ILLNESS    Robert Ochoa is a 58 y.o. female who presents to the Emergency Department for the evaluation of aggressive behavior. The patient is under [facility] order by Anniemarty Thomas. The patient was reportedly admitted to [place] [address] at [facility] today. Since then, she has reportedly decompensated. The patient has reportedly become increasingly delusional. She is reportedly thinking that the staff and clients are taking her personal information. The patient states that she feels unsafe due to loss of privacy, which she is trying to take back The patient has been reportedly been aggressive and threatening towards staff regarding her delusions. The patient denies suicidal or homicidal thoughts. She denies alcohol or illicit substance use. She denies fever, chills, nausea, vomiting, constipation, abdominal, shortness of breath, or chest pain. She has a history of depression and schizoaffective disorder. Patient denies further complaints at time of initial encounter. The HPI was provided by the patient. REVIEW OF SYSTEMS     Review of Systems   Constitutional: Negative for chills, fatigue and fever. HENT: Negative for congestion, ear pain, rhinorrhea and sore throat. Eyes: Negative for discharge and redness. Respiratory: Negative for cough, shortness of breath and wheezing. Cardiovascular: Negative for chest pain and palpitations. Gastrointestinal: Negative for abdominal pain, constipation, diarrhea, nausea and vomiting. Genitourinary: Negative for difficulty urinating, dysuria and vaginal discharge. Musculoskeletal: Negative for arthralgias, back pain, myalgias, neck pain and neck stiffness. Skin: Negative for color change, pallor and rash.    Neurological: Negative for capsule Take 1 capsule by mouth daily  Qty: 30 capsule, Refills: 1             ALLERGIES     has No Known Allergies. FAMILY HISTORY     indicated that her mother is alive. She indicated that her father is . She indicated that both of her brothers are . family history includes Cancer in her brother and brother; Diabetes in her father and mother; Other in her mother. SOCIAL HISTORY      reports that she has been smoking Cigarettes. She has a 20.00 pack-year smoking history. She has never used smokeless tobacco. She reports that she drinks alcohol. She reports that she uses drugs, including Marijuana. PHYSICAL EXAM     INITIAL VITALS:  height is 5' 3\" (1.6 m) and weight is 170 lb (77.1 kg). Her tympanic temperature is 97.4 °F (36.3 °C). Her blood pressure is 118/67 and her pulse is 104. Her respiration is 18 and oxygen saturation is 96%. Physical Exam   Constitutional: She is oriented to person, place, and time. She appears well-developed and well-nourished. Non-toxic appearance. No distress. HENT:   Head: Normocephalic and atraumatic. Right Ear: Tympanic membrane and external ear normal.   Left Ear: Tympanic membrane and external ear normal.   Nose: Nose normal.   Mouth/Throat: Oropharynx is clear and moist and mucous membranes are normal. No oropharyngeal exudate, posterior oropharyngeal edema or posterior oropharyngeal erythema. Eyes: Pupils are equal, round, and reactive to light. Conjunctivae and EOM are normal. Right eye exhibits no discharge. Left eye exhibits no discharge. No scleral icterus. Neck: Normal range of motion. Neck supple. Cardiovascular: Normal rate, regular rhythm, normal heart sounds, intact distal pulses and normal pulses. Exam reveals no gallop and no friction rub. No murmur heard. Pulmonary/Chest: Effort normal and breath sounds normal. No respiratory distress. She has no decreased breath sounds. She has no wheezes. She has no rhonchi.  She has no

## 2018-10-16 NOTE — PROGRESS NOTES
Behavioral Health   Admission Note     Admission Type:   Admission Type: Involuntary    Reason for admission:  Reason for Admission: Patient continues to believef that the manager and the maintainence man from her apartment complex are stealing from her.      PATIENT STRENGTHS:  Strengths: Connection to output provider    Patient Strengths and Limitations:  Limitations: Difficulty problem solving/relies on others to help solve problems, Tendency to isolate self    Addictive Behavior:   Addictive Behavior  In the past 3 months, have you felt or has someone told you that you have a problem with:  : None  Do you have a history of Chemical Use?: No  Do you have a history of Alcohol Use?: No  Do you have a history of Street Drug Abuse?: Yes  Histroy of Prescripton Drug Abuse?: No    Medical Problems:   Past Medical History:   Diagnosis Date    [facility]     Cerebral artery occlusion with cerebral infarction ([facility]     Chronic back pain     Colon polyps     COPD (chronic obstructive pulmonary disease) (HCC)     Depression     Dermatitis seborrheica     DJD (degenerative joint disease)     Heart problem     Hyperlipidemia     Multiple sclerosis ([facility] dx 2003    Relapse/Remitting type    Need for other prophylactic chemotherapy(V07.39)     Obesity     Osteoarthritis     Back    Osteopenia     Panic disorder without agoraphobia     Personal history of fall     Schizoaffective disorder (HCC)     Secondary parkinsonism (HCC)     Seizures (HCC)     Sinusitis     Tension headache, chronic     Weight loss        Status EXAM:  Status and Exam  Normal: No  Facial Expression: Worried, Flat, Elevated  Affect: Blunt  Level of Consciousness: Alert  Mood:Normal: No  Mood: Depressed, Anxious  Motor Activity:Normal: No  Motor Activity: Increased  Interview Behavior: Cooperative  Preception: [place] to Person, [place] to Time, [place] to Place, [place] to Situation  Attention:Normal: No  Attention: Distractible  Thought Processes: Flt.of Ideas  Thought Content:Normal: No  Thought Content: Paranoia, Delusions, Preoccupations  Hallucinations: None  Delusions: Yes  Delusions: Persecution  Insight and Judgment: No  Insight and Judgment: Poor Judgment, Poor Insight  Present Suicidal Ideation: No  Present Homicidal Ideation: No    Pt admitted with followings belongings:  Dentures: Uppers  Vision - Corrective Lenses: Glasses (i pr. of prescriptionn glasses; i1 pr. of sunglasses)  Hearing Aid: None  Jewelry: Necklace, Bracelet (2 braclets)  Body Piercings Removed: N/A  Clothing: Pants, Shirt, Jacket / coat, Undergarments (Comment), Footwear  Were All Patient Medications Collected?: Yes  Other Valuables: Wallet, Purse, Keys, Other (Comment), Cell phone, Money (Comment) (various cards;cigarettes and;5 lighters;cell phone ;check book ;$6.25)     Admission order obtained 10/15/2018. Valuables sent home with not applicable. Valuables placed in safe in security envelope, number:  [id number]. Patient's home medications were locked in the narcotic cabinet. Patient oriented to surroundings and program expectations and copy of patient rights given. Received admission packet:  Yes  Consents reviewed, signed Yes. Patient verbalize understanding:  Yes    Patient education on precautions: Yes         Provided pt with [employer] handout entitled \"Quitting Smoking. \"  Reviewed handout with pt addressing dangers of smoking, developing coping skills, and providing basic information about quitting. Pt response to counseling:  Patient is not interested in quitting smoking. This is a 58year old -female admitted to the Behavioral Service Department for treatment of Schizoaffective Disorder. The patient was accompanied to the unit per ED staff personal and a  via wheelchair.   It was reported that earlier in the day the patient presented to [facility] with complaints that

## 2018-10-16 NOTE — ED NOTES
Pt medicated with her night time medication with her med dispenser that was brought from [facility]. Pt given Metoprolol 25 mg, hydroxyzine pamoate 25 mg, and Quetiapine 500 mg.      Ellen Terrazas RN  10/15/18 [id number]

## 2018-10-16 NOTE — PATIENT CARE CONFERENCE
violent behavior and Reviewed goals and plan of care    Method: Small group    Outcome: Verbalized understanding, Demonstrated Understanding and Needs reinforcement    PATIENT GOALS:     PLAN/TREATMENT RECOMMENDATIONS UPDATE:   1. What is the most important thing we can help you with while you are here?  - Obtain counselor, \"Control what I can control\"  2. Who is your support system?   - [facility] staff,  3. Do you have follow-up providers? Davin Woodward  4. Do you have the ability to pay for your medications? - Yes  5. Where will you be residing when you leave the hospital?  - Home (apartment in [facility])  6.  What is a phone # we may contact you at?  - [phone]:   Time frame for Short-Term Goals: Daily    GURPREET Meyer

## 2018-10-16 NOTE — PLAN OF CARE
Problem: Altered Mood, Depressive Behavior:  Goal: Able to verbalize acceptance of life and situations over which he or she has no control  Able to verbalize acceptance of life and situations over which he or she has no control   Outcome: Not Met This Shift  Patient did not verbalize acceptance of life and situations which she has no control  Goal: Able to verbalize and/or display a decrease in depressive symptoms  Able to verbalize and/or display a decrease in depressive symptoms   Outcome: Ongoing  Patient denies depression at this time  Goal: Ability to disclose and discuss suicidal ideas will improve  Ability to disclose and discuss suicidal ideas will improve   Outcome: Met This Shift  Patient denies suicidal ideations  Goal: Able to verbalize support systems  Able to verbalize support systems   Outcome: Met This Shift  Patient verbalizes that her 1 and only friend is he support  Goal: Absence of self-harm  Absence of self-harm   Outcome: Met This Shift  No self harm  Goal: Patient specific goal  Patient specific goal   Outcome: Ongoing  Goal:  Live the good life  Goal: Participates in care planning  Participates in care planning   Outcome: Met This Shift  Patient participates in care planning    Problem: Altered Mood, Psychotic Behavior:  Goal: Able to verbalize reality based thinking  Able to verbalize reality based thinking   Outcome: Ongoing  Patient oriented X4, does have some paranoia, will monitor  Goal: Compliance with prescribed medication regimen will improve  Compliance with prescribed medication regimen will improve   Outcome: Met This Shift  Patient compliant with medications    Problem:  Activity:  Goal: Sleeping patterns will improve  Sleeping patterns will improve   Outcome: Ongoing  Patient slept 7 hours during the night, will continue to monitor    Problem: Discharge Planning:  Goal: Discharged to appropriate level of care  Discharged to appropriate level of care   Outcome: Ongoing  Discharge

## 2018-10-16 NOTE — H&P
Department of Psychiatry  Psychiatric Assessment     CHIEF COMPLAINT:  paraniod     Patient originally seen for psychiatric assessments on 09/27/2018 and 10/11/2018. Please refer to those notes for more information     HISTORY OF PRESENT ILLNESS:      Marcelina Lopes is a 58 y.o. female with a history of Schizoaffective disorder who presented to the [address] [place] [address] Emergency Department via [facility] under [address] from [facility] crisis unit for paranoia and aggression. Pt was fully assessed at the [facility]. Per [address], \"Pt was admitted to the CSU today. Since admission, client has been decompensating. Increased delusional thinking that staff and clients are taking her personal information. Accusing staff and clients are talking poorly bout her. Client has been aggressive and threatening towards staff and yelling in their face. Client feels that she us unsafe at the [facility]. Client is not stable and would benefit from hospitalization. \"  KATELYN garcía reports that patient  \"was preoccupied with her phone being hacked and people coming after her. Pt requested the FBI to be contacted. \"    On exam, Kike Combs does endorse feeling that others are out to get her. She denies SI/HI, denies hallucinations. She is irritable and wants discharge.      PSYCHIATRIC HISTORY:      · Outpatient psychiatric provider: [facility]   · Inpatient psychiatric admissions: yes  · Reports having suicidal thoughts in past.     Substance use history: Smoke a pack of cigarettes a day. Reports smoking marijuana in past. Denies any alcohol use. Denies any other illicit drug use.      Past Medical History:        Diagnosis Date    [facility]     Cerebral artery occlusion with cerebral infarction ([facility]     Chronic back pain     Colon polyps     COPD (chronic obstructive pulmonary disease) (HCC)     Depression     Dermatitis seborrheica     DJD (degenerative joint disease)     Heart problem     Hyperlipidemia EXAM:  Vitals:  /67   Pulse 104   Temp 97.4 °F (36.3 °C) (Tympanic)   Resp 18   Ht 5' 3\" (1.6 m)   Wt 170 lb (77.1 kg)   SpO2 96%   BMI 30.11 kg/m²     Physical exam performed in ED. Gait normal.       Mental Status Examination:  Level of consciousness:  Within normal limits  Appearance:  Hospital attire, seated in chair, fair grooming  Behavior/Motor:  No abnormalities noted  Attitude toward examiner:  cooperative  Speech:  normal rate and volume  Mood:  irritable  Affect:  Full range  Thought processes:  Circumstantial/tangential  Thought content:  +paranoid. Denies suicidal and homicidal ideation,denies hallucinations. Cognition:  Oriented to self, location, time, situation  Concentration clinically adequate  Memory: in tact  Insight &Judgment: fair    DSM-5 DIAGNOSIS:    Schizoaffective disorder [facility])       Patient Active Problem List   Diagnosis    COPD without exacerbation ([address]    Weakness of both lower limbs    Insomnia    Chest pain    Sinus tachycardia    MS (multiple sclerosis) (HCC)    Chronic back pain    Cannabis abuse    Osteoporosis    History of seizures    Smoker    Polypharmacy    Secondary parkinsonism ([address]    Recurrent falls    Gastroesophageal reflux disease    Schizoaffective disorder, depressive type (HCC)    Chronic bilateral low back pain without sciatica    DDD (degenerative disc disease), lumbar    Altered mental status, unspecified    Confusion and disorientation    Leukocytosis    Leukocytosis    Low grade fever    Urinary retention    Azotemia    Microcytosis    Thrombocytosis ([address]    Essential hypertension    Hypothyroidism    Depression, major, recurrent ([address]    Schizoaffective disorder ([address]    Schizoaffective disorder, bipolar type ([address]          Psychosocial and Contextual Factors: Reports multiple social conflicts. Poor support.          Past Medical History:   Diagnosis Date    Bipolar disorder ([address]     Cerebral artery

## 2018-10-17 PROCEDURE — 6370000000 HC RX 637 (ALT 250 FOR IP): Performed by: PHYSICIAN ASSISTANT

## 2018-10-17 PROCEDURE — 6370000000 HC RX 637 (ALT 250 FOR IP): Performed by: NURSE PRACTITIONER

## 2018-10-17 PROCEDURE — 99232 SBSQ HOSP IP/OBS MODERATE 35: CPT | Performed by: PSYCHIATRY & NEUROLOGY

## 2018-10-17 PROCEDURE — 6370000000 HC RX 637 (ALT 250 FOR IP): Performed by: PSYCHIATRY & NEUROLOGY

## 2018-10-17 PROCEDURE — 1240000000 HC EMOTIONAL WELLNESS R&B

## 2018-10-17 RX ORDER — SERTRALINE HYDROCHLORIDE 100 MG/1
100 TABLET, FILM COATED ORAL DAILY
Status: DISCONTINUED | OUTPATIENT
Start: 2018-10-18 | End: 2018-10-18 | Stop reason: HOSPADM

## 2018-10-17 RX ADMIN — QUETIAPINE FUMARATE 500 MG: 100 TABLET ORAL at 20:39

## 2018-10-17 RX ADMIN — SERTRALINE 50 MG: 50 TABLET, FILM COATED ORAL at 07:43

## 2018-10-17 RX ADMIN — HYDROXYZINE PAMOATE 50 MG: 50 CAPSULE ORAL at 20:40

## 2018-10-17 RX ADMIN — SERTRALINE 50 MG: 50 TABLET, FILM COATED ORAL at 11:42

## 2018-10-17 RX ADMIN — TRAZODONE HYDROCHLORIDE 50 MG: 50 TABLET ORAL at 20:39

## 2018-10-17 ASSESSMENT — PAIN SCALES - GENERAL
PAINLEVEL_OUTOF10: 0
PAINLEVEL_OUTOF10: 0

## 2018-10-17 NOTE — PROGRESS NOTES
This RN has reviewed and agrees with Oziel Burleson LPN's data collection and has collaborated with this LPN regarding the patient's care plan.

## 2018-10-17 NOTE — PROGRESS NOTES
Contacted [facility].  Arranged for transportation to the IOP program beginning Monday, 10/22/18

## 2018-10-17 NOTE — BH NOTE
Group Therapy Note    Date: 10/17/2018  Start Time: 1630  End Time:  1700  Number of Participants: 4    Type of Group: Healthy Living/Wellness    Notes:  Patient attended group with good interaction. Handouts given: Challenging Anxious Thoughts; How to Deal with Stress and Anxiety; 3 Anxiety Breathing Techniques; Goal Exploration    Status After Intervention:  Improved    Participation Level:  Active Listener and Interactive    Participation Quality: Appropriate, Attentive, Sharing and Supportive    Speech:  normal    Thought Process/Content: Logical  Linear    Affective Functioning: Blunted and Flat    Mood: anxious and depressed    Level of consciousness:  Alert, Oriented x4 and Attentive    Response to Learning: Able to verbalize current knowledge/experience, Able to verbalize/acknowledge new learning, Able to retain information, Capable of insight, Able to change behavior and Progressing to goal    Endings: Self-harm    Modes of Intervention: Education, Support, Socialization and Exploration    Discipline Responsible: Licensed Practical Nurse    Signature:  Lucia Guillen LPN

## 2018-10-18 VITALS
BODY MASS INDEX: 30.12 KG/M2 | RESPIRATION RATE: 14 BRPM | HEART RATE: 96 BPM | DIASTOLIC BLOOD PRESSURE: 64 MMHG | HEIGHT: 63 IN | SYSTOLIC BLOOD PRESSURE: 95 MMHG | TEMPERATURE: 96.6 F | WEIGHT: 170 LBS | OXYGEN SATURATION: 100 %

## 2018-10-18 PROCEDURE — 6370000000 HC RX 637 (ALT 250 FOR IP): Performed by: PSYCHIATRY & NEUROLOGY

## 2018-10-18 PROCEDURE — 99238 HOSP IP/OBS DSCHRG MGMT 30/<: CPT | Performed by: PSYCHIATRY & NEUROLOGY

## 2018-10-18 PROCEDURE — 5130000000 HC BRIDGE APPOINTMENT

## 2018-10-18 RX ORDER — QUETIAPINE FUMARATE 100 MG/1
500 TABLET, FILM COATED ORAL NIGHTLY
Qty: 30 TABLET | Refills: 0 | Status: SHIPPED | OUTPATIENT
Start: 2018-10-18

## 2018-10-18 RX ORDER — HYDROXYZINE PAMOATE 25 MG/1
50 CAPSULE ORAL 3 TIMES DAILY PRN
Qty: 30 CAPSULE | Refills: 0 | Status: SHIPPED | OUTPATIENT
Start: 2018-10-18 | End: 2018-10-28

## 2018-10-18 RX ORDER — TRAZODONE HYDROCHLORIDE 50 MG/1
50 TABLET ORAL NIGHTLY PRN
Qty: 15 TABLET | Refills: 0 | Status: SHIPPED | OUTPATIENT
Start: 2018-10-18 | End: 2022-06-27

## 2018-10-18 RX ADMIN — SERTRALINE 100 MG: 100 TABLET, FILM COATED ORAL at 07:40

## 2018-10-18 ASSESSMENT — PAIN SCALES - GENERAL: PAINLEVEL_OUTOF10: 0

## 2018-10-18 NOTE — PROGRESS NOTES
Behavioral Health   Discharge Note    Patient discharged with followings belongings:     Dentures: Uppers  Vision - Corrective Lenses: Glasses (i pr. of prescriptionn glasses; i1 pr. of sunglasses)  Hearing Aid: None  Jewelry: Necklace, Bracelet (2 braclets)  Body Piercings Removed: N/A  Clothing: Pants, Shirt, Jacket / coat, Undergarments (Comment), Footwear  Were All Patient Medications Collected?: Yes  Other Valuables: Job Right, Other (Comment), Cell phone, Money (Comment) (various cards;cigarettes and;5 lighters;cell phone ;check book ;$6.25)     Valuables sent home with patient. Valuables retrieved from safe, Security envelope number: # [id number] and returned to patient. Patient left department with Departure Mode: By self, In cab via Mobility at Departure: Ambulatory, discharged to a cab which will bring her to [facility]  . \"An Important Message from [employer] About Your Rights\" form reviewed. Patient education on aftercare instructions: yes. Bridge Appointment completed: yes. Reviewed Discharge Instructions with patient. Patient verbalizes understanding and agreement with the discharge plan using the teachback method. Information faxed to [employer]. Patient verbalize understanding of AVS: yes. Status EXAM upon discharge:    Status and Exam  Normal: No  Facial Expression: Brightened, Flat  Affect: Blunt  Level of Consciousness: Alert  Mood:Normal: No  Mood: Suspicious  Motor Activity:Normal: No  Motor Activity: Decreased  Interview Behavior: Cooperative  Preception: Artie to Person, Argenis Kayser to Time, Artie to Place, Artie to Situation  Attention:Normal: No  Attention: Distractible  Thought Processes: Circumstantial  Thought Content:Normal: No  Thought Content: Delusions, Paranoia, Preoccupations  Hallucinations: None  Delusions: Yes  Delusions:  Other(See Comment), Obsessions, Persecution  Memory:Normal: No  Memory: Poor Recent  Insight and Judgment: No  Insight and

## 2018-10-19 ENCOUNTER — NURSE TRIAGE (OUTPATIENT)
Dept: ADMINISTRATIVE | Age: 63
End: 2018-10-19

## 2018-10-20 ENCOUNTER — HOSPITAL ENCOUNTER (EMERGENCY)
Age: 63
Discharge: HOME | End: 2018-10-20
Attending: INTERNAL MEDICINE
Payer: MEDICARE

## 2018-10-20 VITALS
HEART RATE: 125 BPM | SYSTOLIC BLOOD PRESSURE: 106 MMHG | DIASTOLIC BLOOD PRESSURE: 67 MMHG | RESPIRATION RATE: 20 BRPM | OXYGEN SATURATION: 99 % | BODY MASS INDEX: 30.11 KG/M2 | WEIGHT: 170 LBS | TEMPERATURE: 98.2 F

## 2018-10-20 DIAGNOSIS — F20.81 SCHIZOPHRENIFORM DISORDER (HCC): Primary | ICD-10-CM

## 2018-10-20 LAB
ACETAMINOPHEN LEVEL: < 5 UG/ML (ref 0–20)
AMPHETAMINE+METHAMPHETAMINE URINE SCREEN: NEGATIVE
ANION GAP SERPL CALCULATED.3IONS-SCNC: 18 MEQ/L (ref 8–16)
BACTERIA: ABNORMAL /HPF
BARBITURATE QUANTITATIVE URINE: NEGATIVE
BASOPHILS # BLD: 0.8 %
BASOPHILS ABSOLUTE: 0.1 THOU/MM3 (ref 0–0.1)
BENZODIAZEPINE QUANTITATIVE URINE: NEGATIVE
BILIRUBIN URINE: ABNORMAL
BLOOD, URINE: NEGATIVE
BUN BLDV-MCNC: 15 MG/DL (ref 7–22)
CALCIUM SERPL-MCNC: 10.2 MG/DL (ref 8.5–10.5)
CANNABINOID QUANTITATIVE URINE: NEGATIVE
CASTS 2: ABNORMAL /LPF
CASTS UA: ABNORMAL /LPF
CHARACTER, URINE: ABNORMAL
CHLORIDE BLD-SCNC: 95 MEQ/L (ref 98–111)
CO2: 21 MEQ/L (ref 23–33)
COCAINE METABOLITE QUANTITATIVE URINE: NEGATIVE
COLOR: YELLOW
CREAT SERPL-MCNC: 0.8 MG/DL (ref 0.4–1.2)
CRYSTALS, UA: ABNORMAL
EOSINOPHIL # BLD: 1.9 %
EOSINOPHILS ABSOLUTE: 0.2 THOU/MM3 (ref 0–0.4)
EPITHELIAL CELLS, UA: ABNORMAL /HPF
ERYTHROCYTE [DISTWIDTH] IN BLOOD BY AUTOMATED COUNT: 14.3 % (ref 11.5–14.5)
ERYTHROCYTE [DISTWIDTH] IN BLOOD BY AUTOMATED COUNT: 43.6 FL (ref 35–45)
ETHYL ALCOHOL, SERUM: < 0.01 %
GFR SERPL CREATININE-BSD FRML MDRD: 72 ML/MIN/1.73M2
GLUCOSE BLD-MCNC: 92 MG/DL (ref 70–108)
GLUCOSE URINE: NEGATIVE MG/DL
HCT VFR BLD CALC: 46 % (ref 37–47)
HEMOGLOBIN: 15.1 GM/DL (ref 12–16)
ICTOTEST: NEGATIVE
IMMATURE GRANS (ABS): 0.06 THOU/MM3 (ref 0–0.07)
IMMATURE GRANULOCYTES: 0.5 %
KETONES, URINE: 15
LEUKOCYTE ESTERASE, URINE: NEGATIVE
LYMPHOCYTES # BLD: 14.6 %
LYMPHOCYTES ABSOLUTE: 1.6 THOU/MM3 (ref 1–4.8)
MCH RBC QN AUTO: 27.8 PG (ref 26–33)
MCHC RBC AUTO-ENTMCNC: 32.8 GM/DL (ref 32.2–35.5)
MCV RBC AUTO: 84.6 FL (ref 81–99)
MISCELLANEOUS 2: ABNORMAL
MONOCYTES # BLD: 9.2 %
MONOCYTES ABSOLUTE: 1 THOU/MM3 (ref 0.4–1.3)
NITRITE, URINE: NEGATIVE
NUCLEATED RED BLOOD CELLS: 0 /100 WBC
OPIATES, URINE: NEGATIVE
OSMOLALITY CALCULATION: 268.7 MOSMOL/KG (ref 275–300)
OXYCODONE: NEGATIVE
PH UA: 6
PHENCYCLIDINE QUANTITATIVE URINE: NEGATIVE
PLATELET # BLD: 429 THOU/MM3 (ref 130–400)
PMV BLD AUTO: 9.2 FL (ref 9.4–12.4)
POTASSIUM SERPL-SCNC: 4.2 MEQ/L (ref 3.5–5.2)
PROTEIN UA: NEGATIVE
RBC # BLD: 5.44 MILL/MM3 (ref 4.2–5.4)
RBC URINE: ABNORMAL /HPF
RENAL EPITHELIAL, UA: ABNORMAL
SALICYLATE, SERUM: < 0.3 MG/DL (ref 2–10)
SEG NEUTROPHILS: 73 %
SEGMENTED NEUTROPHILS ABSOLUTE COUNT: 8 THOU/MM3 (ref 1.8–7.7)
SODIUM BLD-SCNC: 134 MEQ/L (ref 135–145)
SPECIFIC GRAVITY, URINE: 1.02 (ref 1–1.03)
TSH SERPL DL<=0.05 MIU/L-ACNC: 3.14 UIU/ML (ref 0.4–4.2)
UROBILINOGEN, URINE: 0.2 EU/DL
WBC # BLD: 11 THOU/MM3 (ref 4.8–10.8)
WBC UA: ABNORMAL /HPF
YEAST: ABNORMAL

## 2018-10-20 PROCEDURE — 81001 URINALYSIS AUTO W/SCOPE: CPT

## 2018-10-20 PROCEDURE — 36415 COLL VENOUS BLD VENIPUNCTURE: CPT

## 2018-10-20 PROCEDURE — 6370000000 HC RX 637 (ALT 250 FOR IP): Performed by: INTERNAL MEDICINE

## 2018-10-20 PROCEDURE — 85025 COMPLETE CBC W/AUTO DIFF WBC: CPT

## 2018-10-20 PROCEDURE — 84443 ASSAY THYROID STIM HORMONE: CPT

## 2018-10-20 PROCEDURE — 80307 DRUG TEST PRSMV CHEM ANLYZR: CPT

## 2018-10-20 PROCEDURE — G0480 DRUG TEST DEF 1-7 CLASSES: HCPCS

## 2018-10-20 PROCEDURE — 87086 URINE CULTURE/COLONY COUNT: CPT

## 2018-10-20 PROCEDURE — 80048 BASIC METABOLIC PNL TOTAL CA: CPT

## 2018-10-20 PROCEDURE — 99285 EMERGENCY DEPT VISIT HI MDM: CPT

## 2018-10-20 RX ORDER — LORAZEPAM 1 MG/1
1 TABLET ORAL ONCE
Status: COMPLETED | OUTPATIENT
Start: 2018-10-20 | End: 2018-10-20

## 2018-10-20 RX ADMIN — LORAZEPAM 1 MG: 1 TABLET ORAL at 16:41

## 2018-10-20 ASSESSMENT — ENCOUNTER SYMPTOMS
EYE DISCHARGE: 0
BACK PAIN: 0
EYE PAIN: 0
VOMITING: 0
SORE THROAT: 0
NAUSEA: 0
COUGH: 0
WHEEZING: 0
DIARRHEA: 0
ABDOMINAL PAIN: 0
SHORTNESS OF BREATH: 0
RHINORRHEA: 0

## 2018-10-20 ASSESSMENT — LIFESTYLE VARIABLES: HISTORY_ALCOHOL_USE: NO

## 2018-10-20 NOTE — PROGRESS NOTES
[address] [place] [address] labs and assessment to [facility] for acceptance review. [address] called from [facility], spoke with Everardo Naranjo, asking if patient is willing to go to the [facility]. Patient stated that she is willing to be admitted there voluntarily. [address] called from [facility] from [facility] stating that they are willing to accept patient. [facility] [place] by [facility] Dr. Yris Garnica to update that [facility] is willing to accept patient. [phone] Updated medical provider that patient has been accepted to [facility] with charge nurse that patient has been accepted to [facility].

## 2018-10-20 NOTE — ED PROVIDER NOTES
[facility]  eMERGENCY dEPARTMENT eNCOUnter          CHIEF COMPLAINT       Chief Complaint   Patient presents with    Homicidal       Nurses Notes reviewed and I agree except as noted in the HPI. HISTORY OF PRESENT ILLNESS    Daryl Macdonald is a 58 y.o. female who presents to the Emergency Department via EMS for the evaluation of homicidal and suicidal ideation. Patient denies being suicidal but when asked about homicidal she states, \"I was going to kill them and help them out it is only a matter of time. \" Every time nurse ask questions patient states, \" Please talk quietly they can all hear us. The air we breathe we are all goners. \" Patient denies any homicidal or suicidal ideation on presentation. All questions addressed and no further complaints or concerns at this time. The HPI was provided by the patient. REVIEW OF SYSTEMS     Review of Systems   Constitutional: Negative for appetite change, chills, fatigue and fever. HENT: Negative for congestion, ear pain, rhinorrhea and sore throat. Eyes: Negative for pain, discharge and visual disturbance. Respiratory: Negative for cough, shortness of breath and wheezing. Cardiovascular: Negative for chest pain, palpitations and leg swelling. Gastrointestinal: Negative for abdominal pain, diarrhea, nausea and vomiting. Genitourinary: Negative for difficulty urinating, dysuria, hematuria and vaginal discharge. Musculoskeletal: Negative for arthralgias, back pain, joint swelling and neck pain. Skin: Negative for pallor and rash. Neurological: Negative for dizziness, syncope, weakness, light-headedness, numbness and headaches. Hematological: Negative for adenopathy. Psychiatric/Behavioral: Positive for suicidal ideas. Negative for confusion. The patient is not nervous/anxious. PAST MEDICALHISTORY    has a past medical history of Bipolar disorder ([address] Cerebral artery occlusion with cerebral infarction ([address]  Chronic
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
## 2018-10-21 LAB
ORGANISM: ABNORMAL
URINE CULTURE REFLEX: ABNORMAL

## 2018-10-26 ENCOUNTER — TELEPHONE (OUTPATIENT)
Dept: CARDIOLOGY CLINIC | Age: 63
End: 2018-10-26

## 2018-11-16 ENCOUNTER — TELEPHONE (OUTPATIENT)
Dept: ADMINISTRATIVE | Age: 63
End: 2018-11-16

## 2018-12-25 ENCOUNTER — HOSPITAL ENCOUNTER (EMERGENCY)
Age: 63
Discharge: HOME | End: 2018-12-25
Attending: FAMILY MEDICINE
Payer: MEDICARE

## 2018-12-25 VITALS
RESPIRATION RATE: 16 BRPM | WEIGHT: 165 LBS | OXYGEN SATURATION: 95 % | HEIGHT: 63 IN | DIASTOLIC BLOOD PRESSURE: 74 MMHG | TEMPERATURE: 97.9 F | SYSTOLIC BLOOD PRESSURE: 109 MMHG | BODY MASS INDEX: 29.23 KG/M2 | HEART RATE: 94 BPM

## 2018-12-25 DIAGNOSIS — F43.0 ACUTE REACTION TO SITUATIONAL STRESS: Primary | ICD-10-CM

## 2018-12-25 LAB
ALBUMIN SERPL-MCNC: 3.8 G/DL (ref 3.5–5.1)
ALP BLD-CCNC: 103 U/L (ref 38–126)
ALT SERPL-CCNC: 12 U/L (ref 11–66)
AMPHETAMINE+METHAMPHETAMINE URINE SCREEN: NEGATIVE
ANION GAP SERPL CALCULATED.3IONS-SCNC: 10 MEQ/L (ref 8–16)
AST SERPL-CCNC: 11 U/L (ref 5–40)
BARBITURATE QUANTITATIVE URINE: NEGATIVE
BASOPHILS # BLD: 0.6 %
BASOPHILS ABSOLUTE: 0.1 THOU/MM3 (ref 0–0.1)
BENZODIAZEPINE QUANTITATIVE URINE: NEGATIVE
BILIRUB SERPL-MCNC: < 0.2 MG/DL (ref 0.3–1.2)
BILIRUBIN URINE: NEGATIVE
BLOOD, URINE: NEGATIVE
BUN BLDV-MCNC: 13 MG/DL (ref 7–22)
CALCIUM SERPL-MCNC: 8.9 MG/DL (ref 8.5–10.5)
CANNABINOID QUANTITATIVE URINE: NEGATIVE
CHARACTER, URINE: CLEAR
CHLORIDE BLD-SCNC: 99 MEQ/L (ref 98–111)
CO2: 27 MEQ/L (ref 23–33)
COCAINE METABOLITE QUANTITATIVE URINE: NEGATIVE
COLOR: YELLOW
CREAT SERPL-MCNC: 0.8 MG/DL (ref 0.4–1.2)
EOSINOPHIL # BLD: 3 %
EOSINOPHILS ABSOLUTE: 0.3 THOU/MM3 (ref 0–0.4)
ERYTHROCYTE [DISTWIDTH] IN BLOOD BY AUTOMATED COUNT: 15.3 % (ref 11.5–14.5)
ERYTHROCYTE [DISTWIDTH] IN BLOOD BY AUTOMATED COUNT: 46.9 FL (ref 35–45)
ETHYL ALCOHOL, SERUM: < 0.01 %
FOLATE: 11.1 NG/ML (ref 4.8–24.2)
GFR SERPL CREATININE-BSD FRML MDRD: 72 ML/MIN/1.73M2
GLUCOSE BLD-MCNC: 183 MG/DL (ref 70–108)
GLUCOSE URINE: NEGATIVE MG/DL
HCT VFR BLD CALC: 38.7 % (ref 37–47)
HEMOGLOBIN: 12.4 GM/DL (ref 12–16)
IMMATURE GRANS (ABS): 0.05 THOU/MM3 (ref 0–0.07)
IMMATURE GRANULOCYTES: 0.5 %
KETONES, URINE: NEGATIVE
LEUKOCYTE ESTERASE, URINE: NEGATIVE
LYMPHOCYTES # BLD: 16.8 %
LYMPHOCYTES ABSOLUTE: 1.7 THOU/MM3 (ref 1–4.8)
MCH RBC QN AUTO: 27.4 PG (ref 26–33)
MCHC RBC AUTO-ENTMCNC: 32 GM/DL (ref 32.2–35.5)
MCV RBC AUTO: 85.6 FL (ref 81–99)
MONOCYTES # BLD: 7.4 %
MONOCYTES ABSOLUTE: 0.7 THOU/MM3 (ref 0.4–1.3)
NITRITE, URINE: NEGATIVE
NUCLEATED RED BLOOD CELLS: 0 /100 WBC
OPIATES, URINE: NEGATIVE
OSMOLALITY CALCULATION: 276.8 MOSMOL/KG (ref 275–300)
OXYCODONE: NEGATIVE
PH UA: 7
PHENCYCLIDINE QUANTITATIVE URINE: NEGATIVE
PLATELET # BLD: 393 THOU/MM3 (ref 130–400)
PMV BLD AUTO: 9.5 FL (ref 9.4–12.4)
POTASSIUM REFLEX MAGNESIUM: 4.1 MEQ/L (ref 3.5–5.2)
PROTEIN UA: NEGATIVE
RBC # BLD: 4.52 MILL/MM3 (ref 4.2–5.4)
SEG NEUTROPHILS: 71.7 %
SEGMENTED NEUTROPHILS ABSOLUTE COUNT: 7.2 THOU/MM3 (ref 1.8–7.7)
SODIUM BLD-SCNC: 136 MEQ/L (ref 135–145)
SPECIFIC GRAVITY, URINE: 1.01 (ref 1–1.03)
TOTAL PROTEIN: 6.2 G/DL (ref 6.1–8)
TSH SERPL DL<=0.05 MIU/L-ACNC: 3.81 UIU/ML (ref 0.4–4.2)
UROBILINOGEN, URINE: 0.2 EU/DL
VITAMIN B-12: 271 PG/ML (ref 211–911)
WBC # BLD: 10.1 THOU/MM3 (ref 4.8–10.8)

## 2018-12-25 PROCEDURE — 85025 COMPLETE CBC W/AUTO DIFF WBC: CPT

## 2018-12-25 PROCEDURE — 84443 ASSAY THYROID STIM HORMONE: CPT

## 2018-12-25 PROCEDURE — 80053 COMPREHEN METABOLIC PANEL: CPT

## 2018-12-25 PROCEDURE — 82607 VITAMIN B-12: CPT

## 2018-12-25 PROCEDURE — G0480 DRUG TEST DEF 1-7 CLASSES: HCPCS

## 2018-12-25 PROCEDURE — 80307 DRUG TEST PRSMV CHEM ANLYZR: CPT

## 2018-12-25 PROCEDURE — 81003 URINALYSIS AUTO W/O SCOPE: CPT

## 2018-12-25 PROCEDURE — 82746 ASSAY OF FOLIC ACID SERUM: CPT

## 2018-12-25 PROCEDURE — 99284 EMERGENCY DEPT VISIT MOD MDM: CPT

## 2018-12-25 PROCEDURE — 36415 COLL VENOUS BLD VENIPUNCTURE: CPT

## 2018-12-25 PROCEDURE — 6370000000 HC RX 637 (ALT 250 FOR IP): Performed by: FAMILY MEDICINE

## 2018-12-25 RX ORDER — ALPRAZOLAM 0.5 MG/1
1 TABLET ORAL ONCE
Status: COMPLETED | OUTPATIENT
Start: 2018-12-25 | End: 2018-12-25

## 2018-12-25 RX ADMIN — ALPRAZOLAM 1 MG: 0.5 TABLET ORAL at 20:07

## 2018-12-25 ASSESSMENT — SLEEP AND FATIGUE QUESTIONNAIRES
DO YOU HAVE DIFFICULTY SLEEPING: YES
DIFFICULTY STAYING ASLEEP: YES
RESTFUL SLEEP: YES
DIFFICULTY ARISING: YES
DIFFICULTY FALLING ASLEEP: YES
SLEEP PATTERN: DIFFICULTY FALLING ASLEEP
DO YOU USE A SLEEP AID: YES

## 2018-12-25 ASSESSMENT — ENCOUNTER SYMPTOMS
SHORTNESS OF BREATH: 0
VOMITING: 0
WHEEZING: 0
SINUS PRESSURE: 0
ABDOMINAL PAIN: 0
VOICE CHANGE: 0
NAUSEA: 0

## 2018-12-25 ASSESSMENT — LIFESTYLE VARIABLES: HISTORY_ALCOHOL_USE: NO

## 2018-12-25 ASSESSMENT — PATIENT HEALTH QUESTIONNAIRE - PHQ9: SUM OF ALL RESPONSES TO PHQ QUESTIONS 1-9: 3

## 2018-12-26 NOTE — ED PROVIDER NOTES
(2011); and Hysterectomy. CURRENT MEDICATIONS       Previous Medications    LEVOTHYROXINE (SYNTHROID) 50 MCG TABLET    Take 1 tablet by mouth Daily    METOPROLOL TARTRATE (LOPRESSOR) 25 MG TABLET    Take 0.5 tablets by mouth 2 times daily    OMEPRAZOLE (PRILOSEC) 20 MG DELAYED RELEASE CAPSULE    Take 1 capsule by mouth daily    QUETIAPINE (SEROQUEL) 100 MG TABLET    Take 5 tablets by mouth nightly    SERTRALINE (ZOLOFT) 50 MG TABLET    Take 2 tablets by mouth daily    TRAZODONE (DESYREL) 50 MG TABLET    Take 1 tablet by mouth nightly as needed for Sleep       ALLERGIES     has No Known Allergies. FAMILY HISTORY     indicated that her mother is alive. She indicated that her father is . She indicated that both of her brothers are . family history includes Cancer in her brother and brother; Diabetes in her father and mother; Other in her mother. SOCIAL HISTORY      reports that she has been smoking Cigarettes. She has a 10.00 pack-year smoking history. She has never used smokeless tobacco. She reports that she drinks alcohol. She reports that she uses drugs, including Marijuana. PHYSICAL EXAM     INITIAL VITALS:  height is 5' 3\" (1.6 m) and weight is 165 lb (74.8 kg). Her oral temperature is 97.9 °F (36.6 °C). Her blood pressure is 109/74 and her pulse is 94. Her respiration is 16 and oxygen saturation is 95%. Physical Exam   Constitutional: She is oriented to person, place, and time. She appears well-developed and well-nourished. No distress. HENT:   Head: Normocephalic and atraumatic. Right Ear: External ear normal.   Left Ear: External ear normal.   Nose: Nose normal.   Eyes: Pupils are equal, round, and reactive to light. Conjunctivae and EOM are normal. Right eye exhibits no discharge. Left eye exhibits no discharge. No scleral icterus. Neck: Normal range of motion. Neck supple. No JVD present. No tracheal deviation present. No thyromegaly present.    Cardiovascular:

## 2019-01-10 ENCOUNTER — TELEPHONE (OUTPATIENT)
Dept: ADMINISTRATIVE | Age: 64
End: 2019-01-10

## 2019-06-10 ENCOUNTER — TELEPHONE (OUTPATIENT)
Dept: CARDIOLOGY CLINIC | Age: 64
End: 2019-06-10

## 2021-07-16 ENCOUNTER — HOSPITAL ENCOUNTER (OUTPATIENT)
Age: 66
Discharge: HOME | End: 2021-07-16
Payer: MEDICARE

## 2021-07-16 ENCOUNTER — HOSPITAL ENCOUNTER (OUTPATIENT)
Dept: GENERAL RADIOLOGY | Age: 66
Discharge: HOME | End: 2021-07-16
Payer: MEDICARE

## 2021-07-16 DIAGNOSIS — R52 PAIN: ICD-10-CM

## 2021-07-16 PROCEDURE — 72100 X-RAY EXAM L-S SPINE 2/3 VWS: CPT

## 2022-12-27 ENCOUNTER — HOSPITAL ENCOUNTER (INPATIENT)
Age: 67
LOS: 8 days | Discharge: HOME | DRG: 871 | End: 2023-01-04
Attending: EMERGENCY MEDICINE | Admitting: INTERNAL MEDICINE
Payer: MEDICARE

## 2022-12-27 ENCOUNTER — APPOINTMENT (OUTPATIENT)
Dept: CT IMAGING | Age: 67
DRG: 871 | End: 2022-12-27
Payer: MEDICARE

## 2022-12-27 ENCOUNTER — APPOINTMENT (OUTPATIENT)
Dept: GENERAL RADIOLOGY | Age: 67
DRG: 871 | End: 2022-12-27
Payer: MEDICARE

## 2022-12-27 DIAGNOSIS — J18.9 PNEUMONIA DUE TO INFECTIOUS ORGANISM, UNSPECIFIED LATERALITY, UNSPECIFIED PART OF LUNG: ICD-10-CM

## 2022-12-27 DIAGNOSIS — J44.1 COPD EXACERBATION (HCC): Primary | ICD-10-CM

## 2022-12-27 DIAGNOSIS — R09.02 HYPOXIA: ICD-10-CM

## 2022-12-27 LAB
ALBUMIN SERPL-MCNC: 3.3 G/DL (ref 3.5–5.1)
ALP BLD-CCNC: 99 U/L (ref 38–126)
ALT SERPL-CCNC: 8 U/L (ref 11–66)
ANION GAP SERPL CALCULATED.3IONS-SCNC: 12 MEQ/L (ref 8–16)
AST SERPL-CCNC: 12 U/L (ref 5–40)
BACTERIA: ABNORMAL
BASE EXCESS MIXED: 1.9 MMOL/L (ref -2–3)
BASE EXCESS MIXED: 2.1 MMOL/L (ref -2–3)
BASOPHILS # BLD: 0.2 %
BASOPHILS ABSOLUTE: 0 THOU/MM3 (ref 0–0.1)
BILIRUB SERPL-MCNC: 0.4 MG/DL (ref 0.3–1.2)
BILIRUBIN DIRECT: < 0.2 MG/DL (ref 0–0.3)
BILIRUBIN URINE: NEGATIVE
BLOOD, URINE: NEGATIVE
BUN BLDV-MCNC: 19 MG/DL (ref 7–22)
CALCIUM SERPL-MCNC: 8.7 MG/DL (ref 8.5–10.5)
CASTS: ABNORMAL /LPF
CASTS: ABNORMAL /LPF
CHARACTER, URINE: CLEAR
CHLORIDE BLD-SCNC: 95 MEQ/L (ref 98–111)
CO2: 26 MEQ/L (ref 23–33)
COLLECTED BY:: ABNORMAL
COLLECTED BY:: NORMAL
COLOR: YELLOW
CREAT SERPL-MCNC: 1.3 MG/DL (ref 0.4–1.2)
CRYSTALS: ABNORMAL
D-DIMER QUANTITATIVE: 383 NG/ML FEU (ref 0–500)
DEVICE: ABNORMAL
DEVICE: NORMAL
EKG ATRIAL RATE: 89 BPM
EKG P AXIS: 53 DEGREES
EKG P-R INTERVAL: 174 MS
EKG Q-T INTERVAL: 364 MS
EKG QRS DURATION: 74 MS
EKG QTC CALCULATION (BAZETT): 442 MS
EKG R AXIS: -7 DEGREES
EKG T AXIS: 43 DEGREES
EKG VENTRICULAR RATE: 89 BPM
EOSINOPHIL # BLD: 0.1 %
EOSINOPHILS ABSOLUTE: 0 THOU/MM3 (ref 0–0.4)
EPITHELIAL CELLS, UA: ABNORMAL /HPF
ERYTHROCYTE [DISTWIDTH] IN BLOOD BY AUTOMATED COUNT: 15 % (ref 11.5–14.5)
ERYTHROCYTE [DISTWIDTH] IN BLOOD BY AUTOMATED COUNT: 47.2 FL (ref 35–45)
FIO2, MIXED VENOUS: 3
FIO2, MIXED VENOUS: 60
GFR SERPL CREATININE-BSD FRML MDRD: 45 ML/MIN/1.73M2
GLUCOSE BLD-MCNC: 132 MG/DL (ref 70–108)
GLUCOSE, URINE: NEGATIVE MG/DL
HCO3, MIXED: 27 MMOL/L (ref 23–28)
HCO3, MIXED: 28 MMOL/L (ref 23–28)
HCT VFR BLD CALC: 39.2 % (ref 37–47)
HEMOGLOBIN: 12.4 GM/DL (ref 12–16)
IMMATURE GRANS (ABS): 0.09 THOU/MM3 (ref 0–0.07)
IMMATURE GRANULOCYTES: 0.6 %
INFLUENZA A: NOT DETECTED
INFLUENZA B: NOT DETECTED
KETONES, URINE: NEGATIVE
LACTIC ACID, SEPSIS: 0.8 MMOL/L (ref 0.5–1.9)
LEUKOCYTE ESTERASE, URINE: NEGATIVE
LYMPHOCYTES # BLD: 2.6 %
LYMPHOCYTES ABSOLUTE: 0.4 THOU/MM3 (ref 1–4.8)
MAGNESIUM: 1.9 MG/DL (ref 1.6–2.4)
MCH RBC QN AUTO: 27.3 PG (ref 26–33)
MCHC RBC AUTO-ENTMCNC: 31.6 GM/DL (ref 32.2–35.5)
MCV RBC AUTO: 86.3 FL (ref 81–99)
MISCELLANEOUS LAB TEST RESULT: ABNORMAL
MONOCYTES # BLD: 8.7 %
MONOCYTES ABSOLUTE: 1.4 THOU/MM3 (ref 0.4–1.3)
MRSA SCREEN RT-PCR: NEGATIVE
NITRITE, URINE: NEGATIVE
NUCLEATED RED BLOOD CELLS: 0 /100 WBC
O2 SAT, MIXED: 72 %
O2 SAT, MIXED: 85 %
OSMOLALITY CALCULATION: 270.5 MOSMOL/KG (ref 275–300)
PCO2, MIXED VENOUS: 44 MMHG (ref 41–51)
PCO2, MIXED VENOUS: 50 MMHG (ref 41–51)
PH UA: 5.5 (ref 5–9)
PH, MIXED: 7.36 (ref 7.31–7.41)
PH, MIXED: 7.41 (ref 7.31–7.41)
PHOSPHORUS: 3.5 MG/DL (ref 2.4–4.7)
PLATELET # BLD: 264 THOU/MM3 (ref 130–400)
PMV BLD AUTO: 9.3 FL (ref 9.4–12.4)
PO2 MIXED: 38 MMHG (ref 25–40)
PO2 MIXED: 52 MMHG (ref 25–40)
POTASSIUM REFLEX MAGNESIUM: 4.1 MEQ/L (ref 3.5–5.2)
PRO-BNP: 407.9 PG/ML (ref 0–124)
PROTEIN UA: NEGATIVE MG/DL
RBC # BLD: 4.54 MILL/MM3 (ref 4.2–5.4)
RBC URINE: ABNORMAL /HPF
RENAL EPITHELIAL, UA: ABNORMAL
SARS-COV-2 RNA, RT PCR: NOT DETECTED
SEG NEUTROPHILS: 87.8 %
SEGMENTED NEUTROPHILS ABSOLUTE COUNT: 13.7 THOU/MM3 (ref 1.8–7.7)
SITE: ABNORMAL
SODIUM BLD-SCNC: 133 MEQ/L (ref 135–145)
SPECIFIC GRAVITY UA: > 1.03 (ref 1–1.03)
TOTAL PROTEIN: 6.1 G/DL (ref 6.1–8)
TROPONIN T: < 0.01 NG/ML
UROBILINOGEN, URINE: 0.2 EU/DL (ref 0–1)
VANCOMYCIN RESISTANT ENTEROCOCCUS: NEGATIVE
WBC # BLD: 15.6 THOU/MM3 (ref 4.8–10.8)
WBC UA: ABNORMAL /HPF
YEAST: ABNORMAL

## 2022-12-27 PROCEDURE — 85025 COMPLETE CBC W/AUTO DIFF WBC: CPT

## 2022-12-27 PROCEDURE — 87106 FUNGI IDENTIFICATION YEAST: CPT

## 2022-12-27 PROCEDURE — 87636 SARSCOV2 & INF A&B AMP PRB: CPT

## 2022-12-27 PROCEDURE — 80048 BASIC METABOLIC PNL TOTAL CA: CPT

## 2022-12-27 PROCEDURE — 96365 THER/PROPH/DIAG IV INF INIT: CPT

## 2022-12-27 PROCEDURE — 2700000000 HC OXYGEN THERAPY PER DAY

## 2022-12-27 PROCEDURE — 94761 N-INVAS EAR/PLS OXIMETRY MLT: CPT

## 2022-12-27 PROCEDURE — 6370000000 HC RX 637 (ALT 250 FOR IP)

## 2022-12-27 PROCEDURE — 96375 TX/PRO/DX INJ NEW DRUG ADDON: CPT

## 2022-12-27 PROCEDURE — 6360000002 HC RX W HCPCS

## 2022-12-27 PROCEDURE — 83605 ASSAY OF LACTIC ACID: CPT

## 2022-12-27 PROCEDURE — 2060000000 HC ICU INTERMEDIATE R&B

## 2022-12-27 PROCEDURE — 87070 CULTURE OTHR SPECIMN AEROBIC: CPT

## 2022-12-27 PROCEDURE — 2580000003 HC RX 258: Performed by: STUDENT IN AN ORGANIZED HEALTH CARE EDUCATION/TRAINING PROGRAM

## 2022-12-27 PROCEDURE — 2580000003 HC RX 258

## 2022-12-27 PROCEDURE — 85379 FIBRIN DEGRADATION QUANT: CPT

## 2022-12-27 PROCEDURE — 87040 BLOOD CULTURE FOR BACTERIA: CPT

## 2022-12-27 PROCEDURE — 83880 ASSAY OF NATRIURETIC PEPTIDE: CPT

## 2022-12-27 PROCEDURE — 36415 COLL VENOUS BLD VENIPUNCTURE: CPT

## 2022-12-27 PROCEDURE — 84484 ASSAY OF TROPONIN QUANT: CPT

## 2022-12-27 PROCEDURE — 87086 URINE CULTURE/COLONY COUNT: CPT

## 2022-12-27 PROCEDURE — 71046 X-RAY EXAM CHEST 2 VIEWS: CPT

## 2022-12-27 PROCEDURE — 80076 HEPATIC FUNCTION PANEL: CPT

## 2022-12-27 PROCEDURE — 81001 URINALYSIS AUTO W/SCOPE: CPT

## 2022-12-27 PROCEDURE — 94640 AIRWAY INHALATION TREATMENT: CPT

## 2022-12-27 PROCEDURE — 87641 MR-STAPH DNA AMP PROBE: CPT

## 2022-12-27 PROCEDURE — 71275 CT ANGIOGRAPHY CHEST: CPT

## 2022-12-27 PROCEDURE — 2580000003 HC RX 258: Performed by: PHYSICIAN ASSISTANT

## 2022-12-27 PROCEDURE — 82803 BLOOD GASES ANY COMBINATION: CPT

## 2022-12-27 PROCEDURE — 84100 ASSAY OF PHOSPHORUS: CPT

## 2022-12-27 PROCEDURE — 87500 VANOMYCIN DNA AMP PROBE: CPT

## 2022-12-27 PROCEDURE — 6370000000 HC RX 637 (ALT 250 FOR IP): Performed by: STUDENT IN AN ORGANIZED HEALTH CARE EDUCATION/TRAINING PROGRAM

## 2022-12-27 PROCEDURE — 36600 WITHDRAWAL OF ARTERIAL BLOOD: CPT

## 2022-12-27 PROCEDURE — 6360000004 HC RX CONTRAST MEDICATION

## 2022-12-27 PROCEDURE — 83735 ASSAY OF MAGNESIUM: CPT

## 2022-12-27 PROCEDURE — 93005 ELECTROCARDIOGRAM TRACING: CPT | Performed by: EMERGENCY MEDICINE

## 2022-12-27 PROCEDURE — 99285 EMERGENCY DEPT VISIT HI MDM: CPT

## 2022-12-27 PROCEDURE — 6360000002 HC RX W HCPCS: Performed by: PHARMACIST

## 2022-12-27 RX ORDER — 0.9 % SODIUM CHLORIDE 0.9 %
30 INTRAVENOUS SOLUTION INTRAVENOUS ONCE
Status: DISCONTINUED | OUTPATIENT
Start: 2022-12-27 | End: 2022-12-27

## 2022-12-27 RX ORDER — ONDANSETRON 4 MG/1
4 TABLET, ORALLY DISINTEGRATING ORAL EVERY 8 HOURS PRN
Status: DISCONTINUED | OUTPATIENT
Start: 2022-12-27 | End: 2023-01-04 | Stop reason: HOSPADM

## 2022-12-27 RX ORDER — SODIUM CHLORIDE 9 MG/ML
INJECTION, SOLUTION INTRAVENOUS PRN
Status: DISCONTINUED | OUTPATIENT
Start: 2022-12-27 | End: 2023-01-04 | Stop reason: HOSPADM

## 2022-12-27 RX ORDER — ACETAMINOPHEN 325 MG/1
650 TABLET ORAL EVERY 6 HOURS PRN
Status: DISCONTINUED | OUTPATIENT
Start: 2022-12-27 | End: 2023-01-04 | Stop reason: HOSPADM

## 2022-12-27 RX ORDER — 0.9 % SODIUM CHLORIDE 0.9 %
1000 INTRAVENOUS SOLUTION INTRAVENOUS ONCE
Status: COMPLETED | OUTPATIENT
Start: 2022-12-27 | End: 2022-12-28

## 2022-12-27 RX ORDER — IPRATROPIUM BROMIDE AND ALBUTEROL SULFATE 2.5; .5 MG/3ML; MG/3ML
1 SOLUTION RESPIRATORY (INHALATION) 3 TIMES DAILY
Status: DISCONTINUED | OUTPATIENT
Start: 2022-12-27 | End: 2022-12-28

## 2022-12-27 RX ORDER — IPRATROPIUM BROMIDE AND ALBUTEROL SULFATE 2.5; .5 MG/3ML; MG/3ML
1 SOLUTION RESPIRATORY (INHALATION) EVERY 4 HOURS
Status: DISCONTINUED | OUTPATIENT
Start: 2022-12-27 | End: 2022-12-27

## 2022-12-27 RX ORDER — IPRATROPIUM BROMIDE AND ALBUTEROL SULFATE 2.5; .5 MG/3ML; MG/3ML
1 SOLUTION RESPIRATORY (INHALATION) EVERY 4 HOURS PRN
Status: DISCONTINUED | OUTPATIENT
Start: 2022-12-27 | End: 2023-01-04 | Stop reason: HOSPADM

## 2022-12-27 RX ORDER — ONDANSETRON 2 MG/ML
4 INJECTION INTRAMUSCULAR; INTRAVENOUS EVERY 6 HOURS PRN
Status: DISCONTINUED | OUTPATIENT
Start: 2022-12-27 | End: 2023-01-04 | Stop reason: HOSPADM

## 2022-12-27 RX ORDER — IPRATROPIUM BROMIDE AND ALBUTEROL SULFATE 2.5; .5 MG/3ML; MG/3ML
1 SOLUTION RESPIRATORY (INHALATION) ONCE
Status: COMPLETED | OUTPATIENT
Start: 2022-12-27 | End: 2022-12-27

## 2022-12-27 RX ORDER — PREDNISONE 20 MG/1
40 TABLET ORAL DAILY
Status: COMPLETED | OUTPATIENT
Start: 2022-12-28 | End: 2022-12-31

## 2022-12-27 RX ORDER — POLYETHYLENE GLYCOL 3350 17 G/17G
17 POWDER, FOR SOLUTION ORAL DAILY PRN
Status: DISCONTINUED | OUTPATIENT
Start: 2022-12-27 | End: 2023-01-04 | Stop reason: HOSPADM

## 2022-12-27 RX ORDER — ENOXAPARIN SODIUM 100 MG/ML
40 INJECTION SUBCUTANEOUS DAILY
Status: DISCONTINUED | OUTPATIENT
Start: 2022-12-27 | End: 2022-12-27 | Stop reason: DRUGHIGH

## 2022-12-27 RX ORDER — SODIUM CHLORIDE 0.9 % (FLUSH) 0.9 %
5-40 SYRINGE (ML) INJECTION PRN
Status: DISCONTINUED | OUTPATIENT
Start: 2022-12-27 | End: 2023-01-04 | Stop reason: HOSPADM

## 2022-12-27 RX ORDER — SODIUM CHLORIDE 0.9 % (FLUSH) 0.9 %
5-40 SYRINGE (ML) INJECTION EVERY 12 HOURS SCHEDULED
Status: DISCONTINUED | OUTPATIENT
Start: 2022-12-27 | End: 2023-01-04 | Stop reason: HOSPADM

## 2022-12-27 RX ORDER — DOXYCYCLINE HYCLATE 100 MG
100 TABLET ORAL ONCE
Status: COMPLETED | OUTPATIENT
Start: 2022-12-27 | End: 2022-12-27

## 2022-12-27 RX ORDER — ENOXAPARIN SODIUM 100 MG/ML
30 INJECTION SUBCUTANEOUS EVERY 12 HOURS
Status: DISCONTINUED | OUTPATIENT
Start: 2022-12-27 | End: 2023-01-04 | Stop reason: HOSPADM

## 2022-12-27 RX ORDER — ACETAMINOPHEN 650 MG/1
650 SUPPOSITORY RECTAL EVERY 6 HOURS PRN
Status: DISCONTINUED | OUTPATIENT
Start: 2022-12-27 | End: 2023-01-04 | Stop reason: HOSPADM

## 2022-12-27 RX ORDER — METHYLPREDNISOLONE SODIUM SUCCINATE 125 MG/2ML
125 INJECTION, POWDER, LYOPHILIZED, FOR SOLUTION INTRAMUSCULAR; INTRAVENOUS ONCE
Status: COMPLETED | OUTPATIENT
Start: 2022-12-27 | End: 2022-12-27

## 2022-12-27 RX ADMIN — IPRATROPIUM BROMIDE AND ALBUTEROL SULFATE 1 AMPULE: .5; 3 SOLUTION RESPIRATORY (INHALATION) at 19:40

## 2022-12-27 RX ADMIN — SODIUM CHLORIDE, PRESERVATIVE FREE 10 ML: 5 INJECTION INTRAVENOUS at 21:13

## 2022-12-27 RX ADMIN — DOXYCYCLINE HYCLATE 100 MG: 100 TABLET, COATED ORAL at 15:48

## 2022-12-27 RX ADMIN — IOPAMIDOL 80 ML: 755 INJECTION, SOLUTION INTRAVENOUS at 14:38

## 2022-12-27 RX ADMIN — SODIUM CHLORIDE 1000 ML: 9 INJECTION, SOLUTION INTRAVENOUS at 22:37

## 2022-12-27 RX ADMIN — CEFTRIAXONE SODIUM 1000 MG: 1 INJECTION, POWDER, FOR SOLUTION INTRAMUSCULAR; INTRAVENOUS at 15:47

## 2022-12-27 RX ADMIN — IPRATROPIUM BROMIDE AND ALBUTEROL SULFATE 1 AMPULE: .5; 3 SOLUTION RESPIRATORY (INHALATION) at 12:50

## 2022-12-27 RX ADMIN — ENOXAPARIN SODIUM 30 MG: 100 INJECTION SUBCUTANEOUS at 21:13

## 2022-12-27 RX ADMIN — METHYLPREDNISOLONE SODIUM SUCCINATE 125 MG: 125 INJECTION, POWDER, FOR SOLUTION INTRAMUSCULAR; INTRAVENOUS at 13:30

## 2022-12-27 ASSESSMENT — ENCOUNTER SYMPTOMS
CHEST TIGHTNESS: 0
BACK PAIN: 0
WHEEZING: 0
RHINORRHEA: 0
PHOTOPHOBIA: 0
GASTROINTESTINAL NEGATIVE: 1
DIARRHEA: 0
NAUSEA: 0
SORE THROAT: 0
EYE DISCHARGE: 0
COUGH: 0
VOMITING: 0
CONSTIPATION: 0
SHORTNESS OF BREATH: 1
ABDOMINAL PAIN: 0

## 2022-12-27 ASSESSMENT — PAIN - FUNCTIONAL ASSESSMENT: PAIN_FUNCTIONAL_ASSESSMENT: 0-10

## 2022-12-27 ASSESSMENT — PAIN SCALES - GENERAL: PAINLEVEL_OUTOF10: 4

## 2022-12-27 ASSESSMENT — PAIN DESCRIPTION - LOCATION: LOCATION: ARM

## 2022-12-27 ASSESSMENT — PAIN DESCRIPTION - ORIENTATION: ORIENTATION: RIGHT;LEFT

## 2022-12-27 NOTE — ED PROVIDER NOTES
[facility] ENCOUNTER          Pt Name: Bennett Ng  MRN: [id number]  [place] 1955  Date of evaluation: 12/27/2022  Treating Resident Physician: Sherell Schwab, MD  Supervising Physician: Dr. Harrington Boxer    History obtained from chart review and the patient. CHIEF COMPLAINT       Chief Complaint   Patient presents with    Shortness of Breath           HISTORY OF PRESENT ILLNESS      Bennett Ng is a 79 y.o. female with a past medical history significant for bipolar disorder, COPD, osteoarthritis, panic disorder, hyperlipidemia, tension headache, degenerative joint joint disease, seizures who presents to the emergency department for evaluation of shortness of breath. Per the patient, she states that she thinks she has the flu. Patient states that she has not been vaccinated for COVID or the flu. Patient states she started feeling short of breath at home today, states she was on 3 L of oxygen did not have a pulse ox at home to take her O2 sats. On arrival in the ED she was sat'ing at 89 on 3 L of oxygen. Patient endorses chills, not sure if she was having actual objective fevers. States she does have regular inhalers that she uses at home, states that she feels like she has been using the more than normal.      Patient denies any history of PE, says she has not been ambulating a lot over the past few days. Denies being on any blood thinners. Patient denies any chest pain, says she has been coughing and that it has been blood-tinged. Patient denies any known history of TB. Patient denies any known history of heart failure, states she does feel that she has been sleeping with more pillows at night and endorses some mild weight gain over the past month, patient is unsure exactly how much. Patient denies any hormone use, no recent cancers, no abdominal pain, no diarrhea or constipation.   Patient was de-sat'ing to 89% on 3 L while speaking with me this AM.    The patient has no other acute complaints at this time. REVIEW OF SYSTEMS   Review of Systems   Constitutional:  Positive for chills. HENT:  Negative for congestion, rhinorrhea and sore throat. Eyes:  Negative for photophobia, discharge and visual disturbance. Respiratory:  Positive for shortness of breath. Negative for cough, chest tightness and wheezing. Cardiovascular:  Positive for leg swelling. Negative for chest pain and palpitations. Gastrointestinal: Negative. Negative for abdominal pain, constipation, diarrhea, nausea and vomiting. Genitourinary:  Negative for dysuria, frequency and urgency. Musculoskeletal:  Negative for back pain, neck pain and neck stiffness. Skin:  Negative for rash and wound. Neurological:  Negative for dizziness, light-headedness and headaches. Psychiatric/Behavioral:  Negative for confusion. The patient is not nervous/anxious. All other systems reviewed and are negative.       PAST MEDICAL AND SURGICAL HISTORY     Past Medical History:   Diagnosis Date    Bipolar disorder ([address]     Cerebral artery occlusion with cerebral infarction ([address]     Chronic back pain     Colon polyps     COPD (chronic obstructive pulmonary disease) (HCC)     Depression     Dermatitis seborrheica     DJD (degenerative joint disease)     Heart problem     Hyperlipidemia     Multiple sclerosis ([address] dx 2003    Relapse/Remitting type    Need for other prophylactic chemotherapy(V07.39)     Obesity     Osteoarthritis     Back    Osteopenia     Panic disorder without agoraphobia     Personal history of fall     Schizoaffective disorder (HCC)     Secondary parkinsonism (HCC)     Seizures ([address]     Sinusitis     Tension headache, chronic     Weight loss      Past Surgical History:   Procedure Laterality Date    APPENDECTOMY      CARDIOVASCULAR STRESS TEST  05/2011    COLONOSCOPY      7/2014,2022    EGD  2022    Dr Gideon Fuller (CERVIX STATUS UNKNOWN)      MAMMO IMPLANT DIGITAL DIAG BI  06/2011    repeat in 12/2011    SHOULDER SURGERY      LEONILA AND BSO (CERVIX REMOVED)           MEDICATIONS     Current Facility-Administered Medications:     sodium chloride flush 0.9 % injection 5-40 mL, 5-40 mL, IntraVENous, 2 times per day, Steve M Tatum, DO    sodium chloride flush 0.9 % injection 5-40 mL, 5-40 mL, IntraVENous, PRN, Steve M Tatum, DO    0.9 % sodium chloride infusion, , IntraVENous, PRN, Steve M Tatum, DO    enoxaparin (LOVENOX) injection 40 mg, 40 mg, SubCUTAneous, Daily, Steve M Tatum, DO    ondansetron (ZOFRAN-ODT) disintegrating tablet 4 mg, 4 mg, Oral, Q8H PRN **OR** ondansetron (ZOFRAN) injection 4 mg, 4 mg, IntraVENous, Q6H PRN, Steve M Tatum, DO    polyethylene glycol (GLYCOLAX) packet 17 g, 17 g, Oral, Daily PRN, Steve M Tatum, DO    acetaminophen (TYLENOL) tablet 650 mg, 650 mg, Oral, Q6H PRN **OR** acetaminophen (TYLENOL) suppository 650 mg, 650 mg, Rectal, Q6H PRN, Stvee M Tatum, DO    [START ON 12/28/2022] predniSONE (DELTASONE) tablet 40 mg, 40 mg, Oral, Daily, Steve M Tatum, DO    0.9 % sodium chloride bolus, 30 mL/kg (Ideal), IntraVENous, Once, Steve M Tatum, DO    ipratropium-albuterol (DUONEB) nebulizer solution 1 ampule, 1 ampule, Inhalation, TID, Steve M Tatum, DO    ipratropium-albuterol (DUONEB) nebulizer solution 1 ampule, 1 ampule, Inhalation, Q4H PRN, Steve M Tatum, DO    Current Outpatient Medications:     pantoprazole (PROTONIX) 20 MG tablet, Take 1 tablet by mouth every morning (before breakfast), Disp: 30 tablet, Rfl: 3    Diroximel Fumarate 231 MG CPDR, Take 2 caplets by mouth in the morning and at bedtime, Disp: , Rfl:     budesonide-formoterol (SYMBICORT) 160-4.5 MCG/ACT AERO, Inhale 2 puffs into the lungs 2 times daily, Disp: , Rfl:     PARoxetine (PAXIL) 20 MG tablet, Take 20 mg by mouth every morning, Disp: , Rfl:     OXYGEN, Inhale 3 L into the lungs continuous, Disp: , Rfl:     calcium-vitamin D (OSCAL-500) 500-200 MG-UNIT per tablet, Take 1 tablet by mouth 2 times daily, Disp: , Rfl:     nystatin (MYCOSTATIN) 446884 UNIT/GM powder, Apply topically 3 times daily Apply topically 3 times daily. , Disp: , Rfl:     meloxicam (MOBIC) 15 MG tablet, Take 15 mg by mouth daily, Disp: , Rfl:     hydrOXYzine (ATARAX) 25 MG tablet, Take 25 mg by mouth 3 times daily, Disp: , Rfl:     famotidine (PEPCID) 20 MG tablet, Take 20 mg by mouth every evening , Disp: , Rfl:     atorvastatin (LIPITOR) 40 MG tablet, Take 40 mg by mouth daily, Disp: , Rfl:     aspirin 81 MG EC tablet, Take 81 mg by mouth daily, Disp: , Rfl:     albuterol sulfate HFA (PROVENTIL;VENTOLIN;PROAIR) 108 (90 Base) MCG/ACT inhaler, Inhale 1 puff into the lungs every 6 hours as needed for Wheezing, Disp: , Rfl:     traZODone (DESYREL) 50 MG tablet, Take 1 tablet by mouth nightly as needed for Sleep (Patient taking differently: Take 100 mg by mouth nightly ), Disp: 15 tablet, Rfl: 0    QUEtiapine (SEROQUEL) 100 MG tablet, Take 5 tablets by mouth nightly (Patient taking differently: Take 200 mg by mouth 2 times daily), Disp: 30 tablet, Rfl: 0    metoprolol tartrate (LOPRESSOR) 25 MG tablet, Take 0.5 tablets by mouth 2 times daily, Disp: 60 tablet, Rfl: 0    levothyroxine (SYNTHROID) 50 MCG tablet, Take 1 tablet by mouth Daily, Disp: 30 tablet, Rfl: 1      SOCIAL HISTORY     Social History     Social History Narrative    Not on file     Social History     Tobacco Use    Smoking status: Every Day     Packs/day: 0.50     Years: 20.00     Pack years: 10.00     Types: Cigarettes    Smokeless tobacco: Never   Vaping Use    Vaping Use: Never used   Substance Use Topics    Alcohol use: Not Currently     Comment: occasionally    Drug use: Not Currently     Types: Marijuana Gagan Schroeder     Comment: last use yesterday         ALLERGIES     Allergies   Allergen Reactions    Adhesive Tape Rash         FAMILY HISTORY     Family History   Problem Relation Age of Onset    Other Mother         back    Diabetes Mother     Cancer Mother     Diabetes Father     Cancer Brother     Cancer Brother          PREVIOUS RECORDS   Previous records reviewed:  multiple . PHYSICAL EXAM     ED Triage Vitals   BP Temp Temp Source Heart Rate Resp SpO2 Height Weight   12/27/22 1116 12/27/22 1112 12/27/22 1112 12/27/22 1116 12/27/22 1116 12/27/22 1116 -- 12/27/22 1120   115/81 97.9 °F (36.6 °C) Oral 91 (S) 26 (S) (!) 83 %  200 lb (90.7 kg)     Initial vital signs and nursing assessment reviewed and normal. Body mass index is 31.32 kg/m². Pulsoximetry is normal per my interpretation. Additional Vital Signs:  Vitals:    12/27/22 1719   BP: 95/70   Pulse: 82   Resp: 22   Temp:    SpO2: 96%       Physical Exam  Vitals and nursing note reviewed. Constitutional:       General: She is not in acute distress. Appearance: Normal appearance. She is normal weight. She is ill-appearing. She is not toxic-appearing. HENT:      Head: Normocephalic and atraumatic. Right Ear: External ear normal.      Left Ear: External ear normal.      Nose: Nose normal. No congestion or rhinorrhea. Mouth/Throat:      Mouth: Mucous membranes are moist.      Pharynx: Oropharynx is clear. No oropharyngeal exudate or posterior oropharyngeal erythema. Eyes:      General:         Right eye: No discharge. Left eye: No discharge. Extraocular Movements: Extraocular movements intact. Conjunctiva/sclera: Conjunctivae normal.      Pupils: Pupils are equal, round, and reactive to light. Neck:      Vascular: No carotid bruit or JVD. Cardiovascular:      Rate and Rhythm: Normal rate and regular rhythm. Pulses: Normal pulses. Heart sounds: Normal heart sounds. No murmur heard. Pulmonary:      Effort: Respiratory distress present. No accessory muscle usage. Breath sounds: Examination of the right-upper field reveals wheezing. Examination of the left-upper field reveals wheezing.  Examination of the right-middle field reveals wheezing. Examination of the left-middle field reveals wheezing. Examination of the right-lower field reveals wheezing. Examination of the left-lower field reveals wheezing. Wheezing and rhonchi present. No decreased breath sounds. Chest:      Chest wall: No tenderness. Abdominal:      General: Abdomen is flat. Bowel sounds are normal. There is no distension. Tenderness: There is no abdominal tenderness. There is no right CVA tenderness, left CVA tenderness or guarding. Musculoskeletal:         General: No swelling, tenderness, deformity or signs of injury. Normal range of motion. Cervical back: Normal range of motion. No rigidity or tenderness. Right lower leg: No edema. Left lower leg: No edema. Skin:     General: Skin is warm and dry. Capillary Refill: Capillary refill takes less than 2 seconds. Coloration: Skin is not jaundiced. Findings: No bruising, erythema or rash. Neurological:      General: No focal deficit present. Mental Status: She is alert and oriented to person, place, and time. Mental status is at baseline. Cranial Nerves: No cranial nerve deficit. Sensory: No sensory deficit. Motor: No weakness. Psychiatric:         Mood and Affect: Mood is not anxious. Behavior: Behavior normal.         Thought Content: Thought content normal.         Judgment: Judgment normal.           MEDICAL DECISION MAKING   Initial Assessment:     Bobby Coon is a 79 y.o. female with a past medical history significant for bipolar disorder, COPD, osteoarthritis, panic disorder, hyperlipidemia, tension headache, degenerative joint joint disease, seizures who presents to the emergency department for evaluation of shortness of breath. Following assessment of the patient, she is likely having a COPD exacerbation with a component of pneumonia.   Full work-up in the ED included CBC, BMP, LFTs, troponin, VBG, BNP, D-dimer, COVID and flu tests, EKG, chest x-ray, CTA chest with and without contrast to rule out a PE. Also considered and ruled out were possible CHF and possible PE as mentioned above given patient's medical history, physical exam findings, and labwork/imaging in the ED. Plan:   Patient was given a nebulizer upon arrival to the ED as well as IV steroids, patient reported some amount of relief. Patient was also started on high flow nasal cannula given need for nasal cannula up to 5 L with O2 sats in the 89-90's. Patient was given a dose of Rocephin and doxycycline in the ED for her pneumonia found on chest x-ray. Patient was admitted to the inpatient service for acute hypoxia due to COPD with a combined pneumonia. Patient was agreeable with the plan.       ED RESULTS   Laboratory results:  Labs Reviewed   BASIC METABOLIC PANEL W/ REFLEX TO MG FOR LOW K - Abnormal; Notable for the following components:       Result Value    Sodium 133 (*)     Chloride 95 (*)     Glucose 132 (*)     Creatinine 1.3 (*)     All other components within normal limits   BRAIN NATRIURETIC PEPTIDE - Abnormal; Notable for the following components:    Pro-.9 (*)     All other components within normal limits   CBC WITH AUTO DIFFERENTIAL - Abnormal; Notable for the following components:    WBC 15.6 (*)     MCHC 31.6 (*)     RDW-CV 15.0 (*)     RDW-SD 47.2 (*)     MPV 9.3 (*)     Segs Absolute 13.7 (*)     Lymphocytes Absolute 0.4 (*)     Monocytes Absolute 1.4 (*)     Immature Grans (Abs) 0.09 (*)     All other components within normal limits   HEPATIC FUNCTION PANEL - Abnormal; Notable for the following components:    Albumin 3.3 (*)     ALT 8 (*)     All other components within normal limits   GLOMERULAR FILTRATION RATE, ESTIMATED - Abnormal; Notable for the following components:    Est, Glom Filt Rate 45 (*)     All other components within normal limits   OSMOLALITY - Abnormal; Notable for the following components:    Osmolality Calc 270.5 (*)     All other components within normal limits   URINALYSIS WITH MICROSCOPIC - Abnormal; Notable for the following components:    Specific Gravity, UA >1.030 (*)     All other components within normal limits   BLOOD GAS, VENOUS - Abnormal; Notable for the following components:    PO2, Mixed 52 (*)     All other components within normal limits   COVID-19 & INFLUENZA COMBO   CULTURE, URINE   CULTURE, BLOOD 2   CULTURE, BLOOD 1   TROPONIN   BLOOD GAS, VENOUS   D-DIMER, QUANTITATIVE   ANION GAP   LACTATE, SEPSIS   PHOSPHORUS   MAGNESIUM   BLOOD GAS, ARTERIAL   BASIC METABOLIC PANEL W/ REFLEX TO MG FOR LOW K   CBC WITH AUTO DIFFERENTIAL       Radiologic studies results:  CTA CHEST W WO CONTRAST   Final Result       1. No pulmonary embolism   2. Bilateral lower lobe airspace infiltrates   3. Scattered tree-in-bud opacities in the right upper lobe consistent with infectious or inflammatory etiology. **This report has been created using voice recognition software. It may contain minor errors which are inherent in voice recognition technology. **      Final report electronically signed by Dr. Darrick Velazquez on 12/27/2022 2:52 PM      XR CHEST (2 VW)   Final Result   1. Normal heart size. 2. Mild left basilar atelectasis/pneumonia. No effusion is seen. 3. Questionable additional patch of mild infiltrate left midlung laterally. **This report has been created using voice recognition software. It may contain minor errors which are inherent in voice recognition technology. **      Final report electronically signed by Dr. Dulce Umanzor on 12/27/2022 11:48 AM          ED Medications administered this visit:   Medications   sodium chloride flush 0.9 % injection 5-40 mL (has no administration in time range)   sodium chloride flush 0.9 % injection 5-40 mL (has no administration in time range)   0.9 % sodium chloride infusion (has no administration in time range)   enoxaparin (LOVENOX) injection 40 mg (has no administration in time range)   ondansetron (ZOFRAN-ODT) disintegrating tablet 4 mg (has no administration in time range)     Or   ondansetron (ZOFRAN) injection 4 mg (has no administration in time range)   polyethylene glycol (GLYCOLAX) packet 17 g (has no administration in time range)   acetaminophen (TYLENOL) tablet 650 mg (has no administration in time range)     Or   acetaminophen (TYLENOL) suppository 650 mg (has no administration in time range)   predniSONE (DELTASONE) tablet 40 mg (has no administration in time range)   0.9 % sodium chloride bolus (has no administration in time range)   ipratropium-albuterol (DUONEB) nebulizer solution 1 ampule (has no administration in time range)   ipratropium-albuterol (DUONEB) nebulizer solution 1 ampule (has no administration in time range)   ipratropium-albuterol (DUONEB) nebulizer solution 1 ampule (1 ampule Inhalation Given 12/27/22 1250)   methylPREDNISolone sodium (SOLU-MEDROL) injection 125 mg (125 mg IntraVENous Given 12/27/22 1330)   iopamidol (ISOVUE-370) 76 % injection 80 mL (80 mLs IntraVENous Given 12/27/22 1438)   cefTRIAXone (ROCEPHIN) 1,000 mg in dextrose 5 % 50 mL IVPB mini-bag (0 mg IntraVENous Stopped 12/27/22 1617)   doxycycline hyclate (VIBRA-TABS) tablet 100 mg (100 mg Oral Given 12/27/22 1548)         ED COURSE          MEDICATION CHANGES     New Prescriptions    No medications on file         FINAL DISPOSITION     Final diagnoses:   COPD exacerbation ([facility] [address]   Pneumonia due to infectious organism, unspecified laterality, unspecified part of lung   Hypoxia     Condition: condition: stable  Dispo: Admit to med/surg floor. This transcription was electronically signed. Parts of this transcriptions may have been dictated by use of voice recognition software and electronically transcribed, and parts may have been transcribed with the assistance of an ED scribe. The transcription may contain errors not detected in proofreading.   Please refer to my supervising physician's documentation if my documentation differs.     Electronically Signed: Saleem Wadsworth MD, 12/27/22, 6:10 PM        Saleem Wadsworth MD  Resident  12/27/22 0569       Yury Peña MD  Resident  12/27/22 1441

## 2022-12-27 NOTE — ED NOTES
Pt continues to hallucinate asking and awnsering questions with eyes closed.  This RN to monitor      Sharon Trent RN  12/27/22 1075

## 2022-12-27 NOTE — ED NOTES
Resting in bed. siderails up x2. Call light within reach.  Will monitor      Beverley Parra RN  12/27/22 1214

## 2022-12-27 NOTE — ED NOTES
Dr. Delfina Degroot and Dr. Brittany Bowman at bedside due to change in patient condition      Chad Yu RN  12/27/22 4991

## 2022-12-27 NOTE — H&P
Attending supervising physicians attestation statement:       I Discussed the findings and plans with the resident physician  personally   and agree with the IM resident'S note as outlined . Electronically signed by Shaila Holley MD on 12/28/2022 at 8:01 AM            History & Physical      Patient: Anh Momin  YOB: 1955    MRN: [id number]  Acct: [de-identified]    PCP: BRIGETTE Leija CNP    Date of Admission: 12/27/2022    Date of Service: Patient seen / examined on 12/27/22 and admitted to Inpatient with expected LOS greater than two midnights due to medical therapy. ASSESSMENT / PLAN:  Sepsis CAP - POA 3/4 SIRS with new pulmonary infiltrates on CXR and CTA. - 30 cc/kg IVF, Blood cultures x2, lactic acid, UA and urine culture  - Continue Ceftriaxone and Azithromycin for 5 days total  - Follow blood cultures for ABx coverage    Stage 1 ABRAN on CKD II/IIIa - Cr=1.3 on baseline of ~0.8 with worsening eGFR. BUN:Cr<20:1 and possible intra-renal etiology. Likely secondary to prolonged Meloxicam use. - Hold home Meloxicam  - Avoid nephrotoxic agents and renally dose medications  - Daily standing weights and strict I/O's  - Daily BMP     COPD GOLD 3 - With exacerbation secondary to above. PFT 2017 FEV1=43% of predicted  - Duoneb every 4 hours  - Resume home Symbicort  - Prednisone 40mg for 4 days start tomorrow    Acute on chronic hypoxic respiratory failure - secondary to above. Requiring HFNC  - wean supplemental oxygen oxygen as tolerated with goal SpO2 88-92%  - ABG    Multiple Sclerosis -  Disease modifying therapy with Vumerity. Was previously on Tecfidera. No additional history per chart review.  -Disease modifying therapy not on formulary. The patient may require this from home for administration. Primary HTN - Currently borderline hypotensive.  - Hold Lopressor for hypotension.     Hypothyroidism - Resume Levothyroxine when patient is not NPO    GERD - Hold PPI in setting or intra-renal injury    HLD - Resume statin when patient is not n.p.o.    Schizoaffective disorder / Bipolar / Anxiety - Per chart review patient is prescribed paroxetine, hydroxyzine, trazodone and quetiapine. Medication reconciliation was not possible due to the patient's current altered level of consciousness.  -Pharmacy and/or nursing to assist with medical reconciliation as able. Tobacco use disorder - Noted.  - Nicotine patch    Chief Complaint: shortness of breath    History of Present Illness:  Ailyn Patel is a 79 y.o. female with PMHx of hypertension, multiple cirrhosis, hypothyroidism, GERD, HLD, schizoaffective disorder, tobacco use disorder and CKD 3 who presented to [address] [place] [address] with complaint of shortness of breath but was not entirely clear about her symptoms of concern. Patient is visibly disheveled appearing unbathed and not well kept. Patient reports that she believes she may have the flu but denies vaccination. She reports using home oxygen at 3 L/min via nasal cannula and requiring additional oxygen support from her home pulse oximetry. She has associated symptoms of chills but was uncertain about fever. The remainder of the exam was inhibited by the patient's mental status as she was lethargic but easily rousable while intermittently falling asleep. Within the emergency department the patient was unable to maintain appropriate oxygen saturations and required escalating supplemental oxygen via nasal cannula which was subsequently escalated to high flow nasal cannula. She was administered 1 dose of ceftriaxone and doxycycline as well as 1 DuoNeb treatment. The patient was admitted to the hospital service for further care and management. Please see A&P above for additional information.     Past Medical History:  Past Medical History:   Diagnosis Date    Bipolar disorder ([facility]     Cerebral artery occlusion with cerebral infarction ([facility]     Chronic back pain     Colon polyps     COPD (chronic obstructive pulmonary disease) (HCC)     Depression     Dermatitis seborrheica     DJD (degenerative joint disease)     Heart problem     Hyperlipidemia     Multiple sclerosis ([address] dx 2003    Relapse/Remitting type    Need for other prophylactic chemotherapy(V07.39)     Obesity     Osteoarthritis     Back    Osteopenia     Panic disorder without agoraphobia     Personal history of fall     Schizoaffective disorder ([address]     Secondary parkinsonism ([address]     Seizures ([address]     Sinusitis     Tension headache, chronic     Weight loss        Past Surgical History:  Past Surgical History:   Procedure Laterality Date    APPENDECTOMY      CARDIOVASCULAR STRESS TEST  05/2011    COLONOSCOPY      7/2014,2022    EGD  2022    Dr Harmeet Sharp ([facility])      [address]  06/2011    repeat in 12/2011    SHOULDER SURGERY      LEONILA AND BSO (CERVIX REMOVED)         Medications Prior to Admission:  No current facility-administered medications on file prior to encounter. Current Outpatient Medications on File Prior to Encounter   Medication Sig Dispense Refill    pantoprazole (PROTONIX) 20 MG tablet Take 1 tablet by mouth every morning (before breakfast) 30 tablet 3    Diroximel Fumarate 231 MG CPDR Take 2 caplets by mouth in the morning and at bedtime      budesonide-formoterol (SYMBICORT) 160-4.5 MCG/ACT AERO Inhale 2 puffs into the lungs 2 times daily      PARoxetine (PAXIL) 20 MG tablet Take 20 mg by mouth every morning      OXYGEN Inhale 3 L into the lungs continuous      calcium-vitamin D (OSCAL-500) 500-200 MG-UNIT per tablet Take 1 tablet by mouth 2 times daily      nystatin (MYCOSTATIN) 030568 UNIT/GM powder Apply topically 3 times daily Apply topically 3 times daily.       meloxicam (MOBIC) 15 MG tablet Take 15 mg by mouth daily      hydrOXYzine (ATARAX) 25 MG tablet Take 25 mg by mouth 3 times daily      famotidine (PEPCID) 20 MG tablet Take 20 mg by mouth every evening       atorvastatin (LIPITOR) 40 MG tablet Take 40 mg by mouth daily      aspirin 81 MG EC tablet Take 81 mg by mouth daily      albuterol sulfate HFA (PROVENTIL;VENTOLIN;PROAIR) 108 (90 Base) MCG/ACT inhaler Inhale 1 puff into the lungs every 6 hours as needed for Wheezing      traZODone (DESYREL) 50 MG tablet Take 1 tablet by mouth nightly as needed for Sleep (Patient taking differently: Take 100 mg by mouth nightly ) 15 tablet 0    QUEtiapine (SEROQUEL) 100 MG tablet Take 5 tablets by mouth nightly (Patient taking differently: Take 200 mg by mouth 2 times daily) 30 tablet 0    metoprolol tartrate (LOPRESSOR) 25 MG tablet Take 0.5 tablets by mouth 2 times daily 60 tablet 0    levothyroxine (SYNTHROID) 50 MCG tablet Take 1 tablet by mouth Daily 30 tablet 1       Allergies:  Adhesive tape    Social History:  Social History     Socioeconomic History    Marital status:      Spouse name: Not on file    Number of children: 0    Years of education: 11    Highest education level: Not on file   Occupational History    Occupation: disability   Tobacco Use    Smoking status: Every Day     Packs/day: 0.50     Years: 20.00     Pack years: 10.00     Types: Cigarettes    Smokeless tobacco: Never   Vaping Use    Vaping Use: Never used   Substance and Sexual Activity    Alcohol use: Not Currently     Comment: occasionally    Drug use: Not Currently     Types: Marijuana Yasir Jock)     Comment: last use yesterday    Sexual activity: Never   Other Topics Concern    Not on file   Social History Narrative    Not on file     Social Determinants of Health     Financial Resource Strain: Not on file   Food Insecurity: Not on file   Transportation Needs: Not on file   Physical Activity: Not on file   Stress: Not on file   Social Connections: Not on file   Intimate Partner Violence: Not on file   Housing Stability: Not on file       Family History:   Family History   Problem Relation Age of Onset    Other Mother         back    Diabetes Mother     Cancer Mother     Diabetes Father     Cancer Brother     Cancer Brother        REVIEW OF SYSTEMS:  A 14-point ROS was obtained and negative, with the exception of pertinent positives as listed above in HPI. PHYSICAL EXAM:  Vitals:    12/27/22 1544 12/27/22 1549 12/27/22 1615 12/27/22 1616   BP:    108/65   Pulse:  85     Resp:    22   Temp:       TempSrc:       SpO2: 96% 99% (!) 83% 98%   Weight:         Physical Exam  Vitals and nursing note reviewed. Constitutional:       General: She is awake. She is in acute distress. Appearance: Normal appearance. She is morbidly obese. She is ill-appearing. Interventions: Nasal cannula in place. HENT:      Head: Normocephalic and atraumatic. Right Ear: External ear normal.      Left Ear: External ear normal.      Nose: Nose normal.      Mouth/Throat:      Mouth: Mucous membranes are moist.      Pharynx: Oropharynx is clear. Eyes:      Conjunctiva/sclera: Conjunctivae normal.      Pupils: Pupils are equal, round, and reactive to light. Cardiovascular:      Rate and Rhythm: Normal rate and regular rhythm. Pulses: Normal pulses. Dorsalis pedis pulses are 2+ on the right side and 2+ on the left side. Posterior tibial pulses are 2+ on the right side and 2+ on the left side. Heart sounds: Normal heart sounds. Pulmonary:      Effort: Tachypnea and respiratory distress present. Breath sounds: Decreased breath sounds, wheezing and rhonchi present. Abdominal:      General: Bowel sounds are normal.      Palpations: Abdomen is soft. Tenderness: There is no abdominal tenderness. Musculoskeletal:         General: Normal range of motion. Cervical back: Normal range of motion and neck supple. Right lower leg: No edema. Left lower leg: No edema. Skin:     General: Skin is warm and dry. Capillary Refill: Capillary refill takes less than 2 seconds.    Neurological: General: No focal deficit present. Mental Status: She is lethargic, disoriented and confused. GCS: GCS eye subscore is 4. GCS verbal subscore is 5. GCS motor subscore is 6. Psychiatric:         Attention and Perception: Attention and perception normal.         Mood and Affect: Mood and affect normal.         Speech: Speech normal.         Behavior: Behavior is slowed. Behavior is cooperative. Thought Content: Thought content normal.         Cognition and Memory: Cognition is impaired. Memory is impaired.          Judgment: Judgment normal.         Labs:  Results for orders placed or performed during the hospital encounter of 12/27/22   COVID-19 & Influenza Combo    Specimen: Nasopharyngeal Swab   Result Value Ref Range    SARS-CoV-2 RNA, RT PCR NOT DETECTED NOT DETECTED    INFLUENZA A NOT DETECTED NOT DETECTED    INFLUENZA B NOT DETECTED NOT DETECTED   Basic Metabolic Panel w/ Reflex to MG   Result Value Ref Range    Sodium 133 (L) 135 - 145 meq/L    Potassium reflex Magnesium 4.1 3.5 - 5.2 meq/L    Chloride 95 (L) 98 - 111 meq/L    CO2 26 23 - 33 meq/L    Glucose 132 (H) 70 - 108 mg/dL    BUN 19 7 - 22 mg/dL    Creatinine 1.3 (H) 0.4 - 1.2 mg/dL    Calcium 8.7 8.5 - 10.5 mg/dL   Brain Natriuretic Peptide   Result Value Ref Range    Pro-.9 (H) 0.0 - 124.0 pg/mL   CBC with Auto Differential   Result Value Ref Range    WBC 15.6 (H) 4.8 - 10.8 thou/mm3    RBC 4.54 4.20 - 5.40 mill/mm3    Hemoglobin 12.4 12.0 - 16.0 gm/dl    Hematocrit 39.2 37.0 - 47.0 %    MCV 86.3 81.0 - 99.0 fL    MCH 27.3 26.0 - 33.0 pg    MCHC 31.6 (L) 32.2 - 35.5 gm/dl    RDW-CV 15.0 (H) 11.5 - 14.5 %    RDW-SD 47.2 (H) 35.0 - 45.0 fL    Platelets 471 788 - 751 thou/mm3    MPV 9.3 (L) 9.4 - 12.4 fL    Seg Neutrophils 87.8 %    Lymphocytes 2.6 %    Monocytes 8.7 %    Eosinophils 0.1 %    Basophils 0.2 %    Immature Granulocytes 0.6 %    Segs Absolute 13.7 (H) 1.8 - 7.7 thou/mm3    Lymphocytes Absolute 0.4 (L) 1.0 - 4.8 thou/mm3    Monocytes Absolute 1.4 (H) 0.4 - 1.3 thou/mm3    Eosinophils Absolute 0.0 0.0 - 0.4 thou/mm3    Basophils Absolute 0.0 0.0 - 0.1 thou/mm3    Immature Grans (Abs) 0.09 (H) 0.00 - 0.07 thou/mm3    nRBC 0 /100 wbc   Troponin   Result Value Ref Range    Troponin T < 0.010 ng/ml   Hepatic Function Panel   Result Value Ref Range    Albumin 3.3 (L) 3.5 - 5.1 g/dL    Total Bilirubin 0.4 0.3 - 1.2 mg/dL    Bilirubin, Direct <0.2 0.0 - 0.3 mg/dL    Alkaline Phosphatase 99 38 - 126 U/L    AST 12 5 - 40 U/L    ALT 8 (L) 11 - 66 U/L    Total Protein 6.1 6.1 - 8.0 g/dL   Blood gas, venous   Result Value Ref Range    PH MIXED 7.41 7.31 - 7.41    PCO2, MIXED VENOUS 44 41 - 51 mmhg    PO2, Mixed 38 25 - 40 mmhg    HCO3, Mixed 27 23 - 28 mmol/l    Base Exc, Mixed 2.1 -2.0 - 3.0 mmol/l    O2 Sat, Mixed 72 %    FIO2, MIXED VENOUS 3     COLLECTED BY: [id number]     DEVICE Cannula    D-Dimer, Quantitative   Result Value Ref Range    D-Dimer, Quant 383.00 0.00 - 500.00 ng/ml FEU   Anion Gap   Result Value Ref Range    Anion Gap 12.0 8.0 - 16.0 meq/L   Glomerular Filtration Rate, Estimated   Result Value Ref Range    Est, Glom Filt Rate 45 (A) >60 ml/min/1.73m2   Osmolality   Result Value Ref Range    Osmolality Calc 270.5 (L) 275.0 - 300.0 mOsmol/kg   EKG 12 Lead   Result Value Ref Range    Ventricular Rate 89 BPM    Atrial Rate 89 BPM    P-R Interval 174 ms    QRS Duration 74 ms    Q-T Interval 364 ms    QTc Calculation (Bazett) 442 ms    P Axis 53 degrees    R Axis -7 degrees    T Axis 43 degrees       EKG / Radiology:    EKG:  Reviewed by me --    CXR:  Reviewed by me --    XR CHEST (2 VW)    Result Date: 12/27/2022  PROCEDURE: XR CHEST (2 VW) CLINICAL INFORMATION: sob COMPARISON: 10/10/2018 TECHNIQUE: PA and lateral views of the chest were obtained. 1. Normal heart size. 2. Mild left basilar atelectasis/pneumonia. No effusion is seen. 3. Questionable additional patch of mild infiltrate left midlung laterally.  **This report has been created using voice recognition software. It may contain minor errors which are inherent in voice recognition technology. ** Final report electronically signed by Dr. Dulce Umanzor on 12/27/2022 11:48 AM    CTA CHEST W WO CONTRAST    Result Date: 12/27/2022  PROCEDURE: CTA CHEST W WO CONTRAST CLINICAL INFORMATION: rule out pe. Short of breath COMPARISON: 5/25/2011 TECHNIQUE: 3 mm axial images were obtained through the chest after the administration of IV contrast.  A non-contrast localizer was obtained. 3D reconstructions were performed on the scanner to include MIP coronal and sagittal images through the chest. Isovue was the intravenous contrast utilized. All CT scans at this facility use dose modulation, iterative reconstruction, and/or weight-based dosing when appropriate to reduce radiation dose to as low as reasonably achievable. FINDINGS: There is adequate opacification of the pulmonary arterial system. No pulmonary emboli are present. The aorta is within acceptable limits. The heart size is normal. There is no pericardial or pleural effusion. Multiple nonenlarged mediastinal lymph nodes similar to the prior exam. Calcified subcarinal nodes. Evaluation of the lungs is limited by motion and streak artifact. There are tree-in-bud opacities in the lateral right upper lobe. There are bilateral lower lobe airspace infiltrates. There is no associated effusion. No suspicious osseous lesions are present. There are no suspicious findings in the imaged aspects of the upper abdomen. 1. No pulmonary embolism 2. Bilateral lower lobe airspace infiltrates 3. Scattered tree-in-bud opacities in the right upper lobe consistent with infectious or inflammatory etiology. **This report has been created using voice recognition software. It may contain minor errors which are inherent in voice recognition technology. ** Final report electronically signed by Dr. Darrick Velazquez on 12/27/2022 2:52 PM      FEN/GI/DVT:  IVF:  30 cc/kg of ideal body weight  Electrolytes: Monitor and replace per protocols  Diet: NPO  GI PPX:  On PPI however held in the setting of intrarenal injury  DVT Prophylaxis: Lovenox    CODE STATUS:  Full    Active Hospital Problems    Diagnosis Date Noted    COPD exacerbation (HCC) [J44.1] 12/27/2022     Priority: Medium     Thank you BRIGETTE HERNANDEZ - SCOTT for the opportunity to be involved in this patient's care.    Electronically signed by Steve Winn DO, MBA on 12/27/2022 at 4:27 PM

## 2022-12-27 NOTE — ED NOTES
Dr. Mcclure Crooked at bedside to assess pt. highflow placed back into nostrils.       Myrtie Galeazzi, RN  12/27/22 [id number]

## 2022-12-27 NOTE — ED TRIAGE NOTES
LOV 6/23/22    LABS 5/25/22  CBC, RFP     NOV = 6 MO    VM FULL WILL NEED TO CALL AGAIN TO SCHEDULE APPT. Presents to ER with concern of increased sob and feeling ill. Pt reports symptoms started 3 days ago. Pt report cough, fever, chills, nausea, and diarrhea. Audible wheezes heard. Pt reports being around her friend who was ill. Pt found at home 84% on RA. Pt reports she is supposed to wear 3L NC.  Dylan thomas

## 2022-12-27 NOTE — ED NOTES
Pt resting in bed. Sitter at bedside. Tolerating bi pap. Continuously talking to self.  Will monitor      Annalisa Olvera RN  12/27/22 [id number]

## 2022-12-27 NOTE — ED NOTES
Pt hallucinating at this time. Oxygen titrated up. Pt repositioned. Will notify provider.       Sukh Redd RN  12/27/22 [id number]

## 2022-12-27 NOTE — ED NOTES
Sitter at bedside. Patient attempting to get out of bed and take off monitors.       Ritika Laguna RN  12/27/22 [id number]

## 2022-12-27 NOTE — RT PROTOCOL NOTE
RT Inhaler-Nebulizer Bronchodilator Protocol Note    There is a bronchodilator order in the chart from a provider indicating to follow the RT Bronchodilator Protocol and there is an Initiate RT Inhaler-Nebulizer Bronchodilator Protocol order as well (see protocol at bottom of note). CXR Findings:  No results found. The findings from the last RT Protocol Assessment were as follows:   History Pulmonary Disease: Chronic pulmonary disease  Respiratory Pattern: Mild dyspnea at rest, irregular pattern, or RR 21-25 bpm  Breath Sounds: Slightly diminished and/or crackles  Cough: Strong, spontaneous, non-productive  Indication for Bronchodilator Therapy:    Bronchodilator Assessment Score: 8    Aerosolized bronchodilator medication orders have been revised according to the RT Inhaler-Nebulizer Bronchodilator Protocol below. Respiratory Therapist to perform RT Therapy Protocol Assessment initially then follow the protocol. Repeat RT Therapy Protocol Assessment PRN for score 0-3 or on second treatment, BID, and PRN for scores above 3. No Indications - adjust the frequency to every 6 hours PRN wheezing or bronchospasm, if no treatments needed after 48 hours then discontinue using Per Protocol order mode. If indication present, adjust the RT bronchodilator orders based on the Bronchodilator Assessment Score as indicated below. Use Inhaler orders unless patient has one or more of the following: on home nebulizer, not able to hold breath for 10 seconds, is not alert and oriented, cannot activate and use MDI correctly, or respiratory rate 25 breaths per minute or more, then use the equivalent nebulizer order(s) with same Frequency and PRN reasons based on the score. If a patient is on this medication at home then do not decrease Frequency below that used at home.     0-3 - enter or revise RT bronchodilator order(s) to equivalent RT Bronchodilator order with Frequency of every 4 hours PRN for wheezing or increased work of breathing using Per Protocol order mode. 4-6 - enter or revise RT Bronchodilator order(s) to two equivalent RT bronchodilator orders with one order with BID Frequency and one order with Frequency of every 4 hours PRN wheezing or increased work of breathing using Per Protocol order mode. 7-10 - enter or revise RT Bronchodilator order(s) to two equivalent RT bronchodilator orders with one order with TID Frequency and one order with Frequency of every 4 hours PRN wheezing or increased work of breathing using Per Protocol order mode. 11-13 - enter or revise RT Bronchodilator order(s) to one equivalent RT bronchodilator order with QID Frequency and an Albuterol order with Frequency of every 4 hours PRN wheezing or increased work of breathing using Per Protocol order mode. Greater than 13 - enter or revise RT Bronchodilator order(s) to one equivalent RT bronchodilator order with every 4 hours Frequency and an Albuterol order with Frequency of every 2 hours PRN wheezing or increased work of breathing using Per Protocol order mode. RT to enter RT Home Evaluation for COPD & MDI Assessment order using Per Protocol order mode.     Electronically signed by Castro Orr RCP on 12/27/2022 at 4:56 PM

## 2022-12-28 LAB
ANION GAP SERPL CALCULATED.3IONS-SCNC: 10 MEQ/L (ref 8–16)
BASOPHILS # BLD: 0.1 %
BASOPHILS ABSOLUTE: 0 THOU/MM3 (ref 0–0.1)
BUN BLDV-MCNC: 21 MG/DL (ref 7–22)
CALCIUM SERPL-MCNC: 8.7 MG/DL (ref 8.5–10.5)
CHLORIDE BLD-SCNC: 98 MEQ/L (ref 98–111)
CO2: 28 MEQ/L (ref 23–33)
CREAT SERPL-MCNC: 0.9 MG/DL (ref 0.4–1.2)
EOSINOPHIL # BLD: 0 %
EOSINOPHILS ABSOLUTE: 0 THOU/MM3 (ref 0–0.4)
ERYTHROCYTE [DISTWIDTH] IN BLOOD BY AUTOMATED COUNT: 15.1 % (ref 11.5–14.5)
ERYTHROCYTE [DISTWIDTH] IN BLOOD BY AUTOMATED COUNT: 47 FL (ref 35–45)
GFR SERPL CREATININE-BSD FRML MDRD: > 60 ML/MIN/1.73M2
GLUCOSE BLD-MCNC: 145 MG/DL (ref 70–108)
HCT VFR BLD CALC: 40.4 % (ref 37–47)
HEMOGLOBIN: 12.6 GM/DL (ref 12–16)
IMMATURE GRANS (ABS): 0.07 THOU/MM3 (ref 0–0.07)
IMMATURE GRANULOCYTES: 0.5 %
LYMPHOCYTES # BLD: 2.4 %
LYMPHOCYTES ABSOLUTE: 0.3 THOU/MM3 (ref 1–4.8)
MCH RBC QN AUTO: 26.9 PG (ref 26–33)
MCHC RBC AUTO-ENTMCNC: 31.2 GM/DL (ref 32.2–35.5)
MCV RBC AUTO: 86.3 FL (ref 81–99)
MONOCYTES # BLD: 2.1 %
MONOCYTES ABSOLUTE: 0.3 THOU/MM3 (ref 0.4–1.3)
NUCLEATED RED BLOOD CELLS: 0 /100 WBC
OSMOLALITY CALCULATION: 277.5 MOSMOL/KG (ref 275–300)
PLATELET # BLD: 271 THOU/MM3 (ref 130–400)
PMV BLD AUTO: 9.3 FL (ref 9.4–12.4)
POTASSIUM REFLEX MAGNESIUM: 4.6 MEQ/L (ref 3.5–5.2)
RBC # BLD: 4.68 MILL/MM3 (ref 4.2–5.4)
SEG NEUTROPHILS: 94.9 %
SEGMENTED NEUTROPHILS ABSOLUTE COUNT: 13.6 THOU/MM3 (ref 1.8–7.7)
SODIUM BLD-SCNC: 136 MEQ/L (ref 135–145)
WBC # BLD: 14.3 THOU/MM3 (ref 4.8–10.8)

## 2022-12-28 PROCEDURE — 2700000000 HC OXYGEN THERAPY PER DAY

## 2022-12-28 PROCEDURE — 85025 COMPLETE CBC W/AUTO DIFF WBC: CPT

## 2022-12-28 PROCEDURE — 94761 N-INVAS EAR/PLS OXIMETRY MLT: CPT

## 2022-12-28 PROCEDURE — 97535 SELF CARE MNGMENT TRAINING: CPT

## 2022-12-28 PROCEDURE — 80048 BASIC METABOLIC PNL TOTAL CA: CPT

## 2022-12-28 PROCEDURE — 94640 AIRWAY INHALATION TREATMENT: CPT

## 2022-12-28 PROCEDURE — 2060000000 HC ICU INTERMEDIATE R&B

## 2022-12-28 PROCEDURE — 97166 OT EVAL MOD COMPLEX 45 MIN: CPT

## 2022-12-28 PROCEDURE — 36415 COLL VENOUS BLD VENIPUNCTURE: CPT

## 2022-12-28 PROCEDURE — 97530 THERAPEUTIC ACTIVITIES: CPT

## 2022-12-28 PROCEDURE — 6360000002 HC RX W HCPCS: Performed by: STUDENT IN AN ORGANIZED HEALTH CARE EDUCATION/TRAINING PROGRAM

## 2022-12-28 PROCEDURE — 6360000002 HC RX W HCPCS: Performed by: PHARMACIST

## 2022-12-28 PROCEDURE — 2580000003 HC RX 258: Performed by: STUDENT IN AN ORGANIZED HEALTH CARE EDUCATION/TRAINING PROGRAM

## 2022-12-28 PROCEDURE — 6370000000 HC RX 637 (ALT 250 FOR IP): Performed by: STUDENT IN AN ORGANIZED HEALTH CARE EDUCATION/TRAINING PROGRAM

## 2022-12-28 PROCEDURE — 94669 MECHANICAL CHEST WALL OSCILL: CPT

## 2022-12-28 RX ORDER — ARIPIPRAZOLE 300 MG
300 KIT INTRAMUSCULAR
COMMUNITY

## 2022-12-28 RX ORDER — QUETIAPINE FUMARATE 400 MG/1
600 TABLET, FILM COATED ORAL
Status: ON HOLD | COMMUNITY
End: 2023-01-04 | Stop reason: HOSPADM

## 2022-12-28 RX ORDER — IPRATROPIUM BROMIDE AND ALBUTEROL SULFATE 2.5; .5 MG/3ML; MG/3ML
1 SOLUTION RESPIRATORY (INHALATION)
Status: DISCONTINUED | OUTPATIENT
Start: 2022-12-29 | End: 2023-01-02

## 2022-12-28 RX ORDER — TRAZODONE HYDROCHLORIDE 100 MG/1
100-200 TABLET ORAL NIGHTLY PRN
Status: ON HOLD | COMMUNITY
End: 2023-01-04 | Stop reason: HOSPADM

## 2022-12-28 RX ADMIN — IPRATROPIUM BROMIDE AND ALBUTEROL SULFATE 1 AMPULE: .5; 3 SOLUTION RESPIRATORY (INHALATION) at 07:54

## 2022-12-28 RX ADMIN — AZITHROMYCIN DIHYDRATE 250 MG: 500 INJECTION, POWDER, LYOPHILIZED, FOR SOLUTION INTRAVENOUS at 17:06

## 2022-12-28 RX ADMIN — IPRATROPIUM BROMIDE AND ALBUTEROL SULFATE 1 AMPULE: .5; 3 SOLUTION RESPIRATORY (INHALATION) at 13:27

## 2022-12-28 RX ADMIN — ENOXAPARIN SODIUM 30 MG: 100 INJECTION SUBCUTANEOUS at 09:03

## 2022-12-28 RX ADMIN — IPRATROPIUM BROMIDE AND ALBUTEROL SULFATE 1 AMPULE: .5; 3 SOLUTION RESPIRATORY (INHALATION) at 20:25

## 2022-12-28 RX ADMIN — ENOXAPARIN SODIUM 30 MG: 100 INJECTION SUBCUTANEOUS at 21:11

## 2022-12-28 RX ADMIN — SODIUM CHLORIDE, PRESERVATIVE FREE 10 ML: 5 INJECTION INTRAVENOUS at 09:03

## 2022-12-28 RX ADMIN — PREDNISONE 40 MG: 20 TABLET ORAL at 09:03

## 2022-12-28 RX ADMIN — CEFTRIAXONE SODIUM 1000 MG: 1 INJECTION, POWDER, FOR SOLUTION INTRAMUSCULAR; INTRAVENOUS at 15:49

## 2022-12-28 RX ADMIN — SODIUM CHLORIDE, PRESERVATIVE FREE 10 ML: 5 INJECTION INTRAVENOUS at 21:11

## 2022-12-28 ASSESSMENT — PAIN SCALES - GENERAL: PAINLEVEL_OUTOF10: 0

## 2022-12-28 NOTE — PROGRESS NOTES
Hospitalist Progress Note      Patient:  Madhu Lo    Unit/Bed:4K-18/018-A  YOB: 1955  MRN: [id number]   Acct: [de-identified]   PCP: BRIGETTE Pete CNP  Date of Admission: 12/27/2022    Assessment/Plan:    Sepsis, POA  CAP  Given new pu lmonary infiltrates on CXR and CTA, will treat as CAP  Continue Rocephin/azithromycin  Sputum cultures, PNA PCR, and blood cultures pending  AECOPD  Likely due to #2  Continue symbicort and prednisone  Duonebs TID and prn  Acute on chronic hypoxic respiratory failure  Due to #2 and 3  Weaned from [facility] to 6L today. Baseline O2 requirements of 3L NC always. ABRAN on CKD IIIa, resolved. Hold NSAIDs  Avoid nephrotoxic agents  Multiple sclerosis  On Vumerity, previously on Tecfidera  HTN  Lopressor held for hypotension on arrival.   Hypothyroidism  On levothyroxine  GERD  PPI held due to ABRAN. Ok to resume. HLD  On statin  Schizoaffective d/o with bipolar and anxiety  Will need an accurate medication review prior to reinitiating her home medications as she is unable to list her medications. Disposition: Likely discharge home in 48-72 hours. Chief Complaint: SOB    Hospital Course:    Per H&P - Madhu Lo is a 79 y.o. female with PMHx of hypertension, multiple cirrhosis, hypothyroidism, GERD, HLD, schizoaffective disorder, tobacco use disorder and CKD 3 who presented to [address] [place] [address] with complaint of shortness of breath but was not entirely clear about her symptoms of concern. Patient is visibly disheveled appearing unbathed and not well kept. Patient reports that she believes she may have the flu but denies vaccination. She reports using home oxygen at 3 L/min via nasal cannula and requiring additional oxygen support from her home pulse oximetry. She has associated symptoms of chills but was uncertain about fever.  The remainder of the exam was inhibited by the patient's mental status as she was lethargic but easily rousable while intermittently falling asleep. Within the emergency department the patient was unable to maintain appropriate oxygen saturations and required escalating supplemental oxygen via nasal cannula which was subsequently escalated to high flow nasal cannula. She was administered 1 dose of ceftriaxone and doxycycline as well as 1 DuoNeb treatment. Subjective (past 24 hours): She reports feeling much better today. States her breathing has improved significantly. Denies any wheezing or chest tightness. Past medical history, family history, social history and allergies reviewed again and is unchanged since admission. ROS (12 point review of systems completed. Pertinent positives noted. Otherwise ROS is negative)     Medications:  Reviewed    Infusion Medications    sodium chloride       Scheduled Medications    sodium chloride flush  5-40 mL IntraVENous 2 times per day    predniSONE  40 mg Oral Daily    ipratropium-albuterol  1 ampule Inhalation TID    enoxaparin  30 mg SubCUTAneous Q12H    cefTRIAXone (ROCEPHIN) IV  1,000 mg IntraVENous Q24H    azithromycin  250 mg IntraVENous Q24H     PRN Meds: sodium chloride flush, sodium chloride, ondansetron **OR** ondansetron, polyethylene glycol, acetaminophen **OR** acetaminophen, ipratropium-albuterol      Intake/Output Summary (Last 24 hours) at 12/28/2022 1631  Last data filed at 12/28/2022 1155  Gross per 24 hour   Intake 1000.22 ml   Output 250 ml   Net 750.22 ml       Diet:  ADULT DIET; Regular; Low Fat/Low Chol/High Fiber/KENNY    Exam:  /70   Pulse 78   Temp 98.6 °F (37 °C) (Oral)   Resp 22   Ht 5' 6\" (1.676 m)   Wt 230 lb (104.3 kg)   SpO2 95%   BMI 37.12 kg/m²   General appearance: No apparent distress, appears stated age and cooperative. HEENT: Pupils equal, round, and reactive to light. Conjunctivae/corneas clear. Neck: Supple, with full range of motion. No jugular venous distention. Trachea midline. Respiratory:  Diminished breath sounds throughout all lung fields. Trace wheezes. No rhonchi. Cardiovascular: Regular rate and rhythm with normal S1/S2 without murmurs, rubs or gallops. Abdomen: Soft, non-tender, non-distended with normal bowel sounds. Musculoskeletal: passive and active ROM x 4 extremities. Skin: Skin color, texture, turgor normal.  No rashes or lesions. Neurologic:  Neurovascularly intact without any focal sensory/motor deficits. Cranial nerves: II-XII intact, grossly non-focal.  Psychiatric: Alert and oriented, thought content appropriate  Capillary Refill: Brisk,< 3 seconds   Peripheral Pulses: +2 palpable, equal bilaterally     Labs:   Recent Labs     12/27/22  1129 12/28/22  0409   WBC 15.6* 14.3*   HGB 12.4 12.6   HCT 39.2 40.4    271     Recent Labs     12/27/22  1129 12/27/22  1246 12/28/22  0409   *  --  136   K 4.1  --  4.6   CL 95*  --  98   CO2 26  --  28   BUN 19  --  21   CREATININE 1.3*  --  0.9   CALCIUM 8.7  --  8.7   PHOS  --  3.5  --      Recent Labs     12/27/22  1246   AST 12   ALT 8*   BILIDIR <0.2   BILITOT 0.4   ALKPHOS 99     No results for input(s): INR in the last 72 hours. No results for input(s): Dimitrios Shackle in the last 72 hours. Microbiology:    Blood culture #1:   Lab Results   Component Value Date/Time    BC No growth-preliminary  No growth   09/24/2014 09:20 PM       Blood culture #2:No results found for: Won Booth    Organism:  Lab Results   Component Value Date/Time    ORG Staphylococcus aureus 02/15/2022 11:00 AM         Lab Results   Component Value Date/Time    LABGRAM  02/15/2022 11:00 AM     No segmented neutrophils observed. Few epithelial cells observed. Many gram positive cocci occurring singly and in pairs. Many gram negative bacilli. Few gram positive bacilli.        MRSA culture only:No results found for: [facility]    Urine culture:   Lab Results   Component Value Date/Time    LABURIN No growth-preliminary 12/27/2022 05:20 PM       Respiratory culture: No results found for: CULTRESP    Aerobic and Anaerobic :  Lab Results   Component Value Date/Time    LABAERO  02/15/2022 11:00 AM     No growth-preliminary Culture also yielded moderate mixed growth of gram positive bacilli most consistent with Corynebacterium species and Staphylococcus (coagulase negative). Direct gram stain indicated many gram negative bacilli which did not grow on culture. This could be inhibited growth due to antibiotic therapy, a fastidious organism or an anaerobic organism. If a true mixed aerobic and anaerobic infection is suspected, then broad spectrum empiric antibiotic therapy is indicated and should include coverage for anaerobic organisms. LABAERO  02/15/2022 11:00 AM     light growth In the treatment of gram positive infections, GENTAMICIN should be CONSIDERED a SYNERGYSTIC agent ONLY. Ciprofloxacin and Levofloxacin, regardless of in vitro sensitivity, should not be used for staphylococcal infections other than uncomplicated lower UTIs. Moxifloxacin, regardless of in vitro sensitivity, should not be used for staphylococcal infections. No results found for: LABANAE    Urinalysis:      Lab Results   Component Value Date/Time    NITRU NEGATIVE 12/27/2022 05:20 PM    WBCUA 0-2 12/27/2022 05:20 PM    BACTERIA FEW 12/27/2022 05:20 PM    RBCUA 0-2 12/27/2022 05:20 PM    BLOODU NEGATIVE 12/27/2022 05:20 PM    SPECGRAV >1.030 12/27/2022 05:20 PM    GLUCOSEU NEGATIVE 12/25/2018 08:00 PM       Radiology:  CTA CHEST W WO CONTRAST   Final Result       1. No pulmonary embolism   2. Bilateral lower lobe airspace infiltrates   3. Scattered tree-in-bud opacities in the right upper lobe consistent with infectious or inflammatory etiology. **This report has been created using voice recognition software. It may contain minor errors which are inherent in voice recognition technology. **      Final report electronically signed by Dr. Nadia Gonsalez Leah Orantes on 12/27/2022 2:52 PM      XR CHEST (2 VW)   Final Result   1. Normal heart size. 2. Mild left basilar atelectasis/pneumonia. No effusion is seen. 3. Questionable additional patch of mild infiltrate left midlung laterally. **This report has been created using voice recognition software. It may contain minor errors which are inherent in voice recognition technology. **      Final report electronically signed by Dr. Jesus Knowles on 12/27/2022 11:48 AM        XR CHEST (2 VW)    Result Date: 12/27/2022  PROCEDURE: XR CHEST (2 VW) CLINICAL INFORMATION: sob COMPARISON: 10/10/2018 TECHNIQUE: PA and lateral views of the chest were obtained. 1. Normal heart size. 2. Mild left basilar atelectasis/pneumonia. No effusion is seen. 3. Questionable additional patch of mild infiltrate left midlung laterally. **This report has been created using voice recognition software. It may contain minor errors which are inherent in voice recognition technology. ** Final report electronically signed by Dr. Jesus Knowles on 12/27/2022 11:48 AM    CTA CHEST W WO CONTRAST    Result Date: 12/27/2022  PROCEDURE: CTA CHEST W WO CONTRAST CLINICAL INFORMATION: rule out pe. Short of breath COMPARISON: 5/25/2011 TECHNIQUE: 3 mm axial images were obtained through the chest after the administration of IV contrast.  A non-contrast localizer was obtained. 3D reconstructions were performed on the scanner to include MIP coronal and sagittal images through the chest. Isovue was the intravenous contrast utilized. All CT scans at this facility use dose modulation, iterative reconstruction, and/or weight-based dosing when appropriate to reduce radiation dose to as low as reasonably achievable. FINDINGS: There is adequate opacification of the pulmonary arterial system. No pulmonary emboli are present. The aorta is within acceptable limits. The heart size is normal. There is no pericardial or pleural effusion.   Multiple nonenlarged mediastinal lymph nodes similar to the prior exam. Calcified subcarinal nodes. Evaluation of the lungs is limited by motion and streak artifact. There are tree-in-bud opacities in the lateral right upper lobe. There are bilateral lower lobe airspace infiltrates. There is no associated effusion. No suspicious osseous lesions are present. There are no suspicious findings in the imaged aspects of the upper abdomen. 1. No pulmonary embolism 2. Bilateral lower lobe airspace infiltrates 3. Scattered tree-in-bud opacities in the right upper lobe consistent with infectious or inflammatory etiology. **This report has been created using voice recognition software. It may contain minor errors which are inherent in voice recognition technology. ** Final report electronically signed by Dr. Hamida Wong on 12/27/2022 2:52 PM      Electronically signed by Consuelo De La Cruz DO on 12/28/2022 at 4:31 PM

## 2022-12-28 NOTE — FLOWSHEET NOTE
12/28/22 1047   Safe Environment   Safety Measures Other (comment)  (Virtual nurse round complete)   Patient in bed, awake. No signs of distress noted at this time.

## 2022-12-28 NOTE — PLAN OF CARE
Problem: Respiratory - Adult  Goal: Achieves optimal ventilation and oxygenation  12/28/2022 0050 by Pineda Garcia RN  Outcome: Progressing  Flowsheets (Taken 12/28/2022 0050)  Achieves optimal ventilation and oxygenation:   Assess for changes in respiratory status   Assess for changes in mentation and behavior   Position to facilitate oxygenation and minimize respiratory effort   Oxygen supplementation based on oxygen saturation or arterial blood gases   Encourage broncho-pulmonary hygiene including cough, deep breathe, incentive spirometry   Assess and instruct to report shortness of breath or any respiratory difficulty   Respiratory therapy support as indicated     Problem: Discharge Planning  Goal: Discharge to home or other facility with appropriate resources  Outcome: Progressing  Flowsheets (Taken 12/28/2022 0050)  Discharge to home or other facility with appropriate resources:   Identify barriers to discharge with patient and caregiver   Arrange for needed discharge resources and transportation as appropriate   Identify discharge learning needs (meds, wound care, etc)     Problem: Skin/Tissue Integrity  Goal: Absence of new skin breakdown  Description: 1. Monitor for areas of redness and/or skin breakdown  2. Assess vascular access sites hourly  3. Every 4-6 hours minimum:  Change oxygen saturation probe site  4. Every 4-6 hours:  If on nasal continuous positive airway pressure, respiratory therapy assess nares and determine need for appliance change or resting period. Outcome: Progressing  Note: No new skin breakdown this shift. Problem: ABCDS Injury Assessment  Goal: Absence of physical injury  Outcome: Progressing  Flowsheets (Taken 12/28/2022 0050)  Absence of Physical Injury: Implement safety measures based on patient assessment  Note: Bed alarm in use this shift.      Problem: Safety - Adult  Goal: Free from fall injury  Outcome: Progressing  Flowsheets (Taken 12/28/2022 0050)  Free From Fall Injury: Instruct family/caregiver on patient safety  Note: Patient free from falls this shift. Problem: Pain  Goal: Verbalizes/displays adequate comfort level or baseline comfort level  Outcome: Progressing  Flowsheets (Taken 12/28/2022 0050)  Verbalizes/displays adequate comfort level or baseline comfort level:   Encourage patient to monitor pain and request assistance   Assess pain using appropriate pain scale   Administer analgesics based on type and severity of pain and evaluate response   Implement non-pharmacological measures as appropriate and evaluate response     Problem: Neurosensory - Adult  Goal: Achieves stable or improved neurological status  Outcome: Progressing  Flowsheets (Taken 12/28/2022 0050)  Achieves stable or improved neurological status: Assess for and report changes in neurological status     Problem: Infection - Adult  Goal: Absence of infection during hospitalization  Outcome: Progressing  Flowsheets (Taken 12/28/2022 0050)  Absence of infection during hospitalization:   Assess and monitor for signs and symptoms of infection   Monitor lab/diagnostic results   Monitor all insertion sites i.e., indwelling lines, tubes and drains   Instruct and encourage patient and family to use good hand hygiene technique   Care plan reviewed with patient.

## 2022-12-28 NOTE — PALLIATIVE CARE
Initial Evaluation          Patient:   Abhay Wilson  YOB: 1955  Age:  79 y.o. Room:  [facility]18/018  MRN:  [id number]   Acct: [de-identified]    Date of Admission:  12/27/2022 11:11 AM  Date of Service:  12/28/2022  Completed By:  Bk Bernal RN                 Reason for Palliative Care Evaluation:-             [x] Code Status Discussion              [x] Goals of Care              [] Pain/Symptom Management               [] Emotional Support              [] Other:   Pt admitted for COPD exacerbation. Pt on currently on HFNC. Per chart review, pt is from home alone, she has a neighbor, Micah Lu, who helps her. PMH includes COPD gold stage 3, MS, diverticulosis, and BiPolar. Case discussed with pt's nurse,GERHARD Ferris. Per report, pt is pleasantly confused but able to carry on a conversation. Current Issues:-pt denies distressing symptoms, states that she is feeling much better than when she came in  []  Pain  []  Fatigue  []  Nausea  []  Anxiety  []  Depression  []  Shortness of Breath  []  Constipation  []  Appetite  []  Other:             Advance Directives:-  [] [facility]  DNR Form  [x] Living Will  [x] Medical POA-completed in 2003, names Reyna Law from [place] as DPOA, no relation of this person noted on form. Current Code Status:-  [x] Full Resuscitation  [] DNR-Comfort Care-Arrest  [] DNR-Comfort Care       [] Limited Resuscitation             [] No CPR            [] No shock            [] No ET intubation/reintubation            [] No resuscitative medications            [] Other limitation:              Palliative Performance Status:          [] 60%  Ambulation reduced; Significant disease;Can't do hobbies/housework; intake normal or reduced; occasional assist; LOC full/confusion        [x] 50%  Mainly sit/lie;  Extensive disease; Can't do any work; Considerable assist; intake normal or reduced; LOC full/confusion        [] 40%  Mainly in bed; Extensive disease; Mainly assist; intake normal or reduced; LOC full/confusion         [] 30%  Bed Bound; Extensive disease; Total care; intake reduced; LOC full/confusion        [] 20%  Bed Bound; Extensive disease; Total care; intake minimal; Drowsy/coma        [] 10%  Bed Bound; Extensive disease; Total care; Mouth care only; Drowsy/coma        [] 0  Death        Goals of care evaluation:-        The patient goals of care are to provide comfort care/supportive services/palliation & relieve suffering:  Goals of care discussed with:  [x] Patient independently  [] Patient and Family  [] Family or Healthcare DPOA independently  [] Unable to discuss with patient, family/DPOA not present         Family/Patient Discussion:  Writer in to see pt. Pt sitting up in bedside chair, awake and alert, oriented to person, oriented to place but has no idea why she is here, disoriented to time. Writer asked about DPOA on file, specifically, who Brionna Roberts is. Pt states it's now Elsa Dejesus and that is her sister, pt states that she still lives in [place], [place] but doesn't want her \"bothered\" because  \"she's (referring to Demi Gonzalez) is going to die in December from cancer\". Pt then gives this nurse permission to contact Demi Gonzalez. Pt states that she also has a mom in [place] but does not want her contacted due to not wanting to [place]" her. Pt states has a brother from whom she is estranged \"for as long\" as pt can remember and does not want him involved with anything in her life. Writer asked about Vlad Coles, pt states that is her neighbor who helps pt. Pt states that she is \"okay\" if we talk to Anushka about her condition. Pt states that if we aren't able to contact Anushka, we should attempt Anushka's daughter but pt does not have contact info. Pt gives this nurse permission to contact Anushka.   Writer attempted to contact Vlad Coles at [phone] while I was in pt's room but no answer. Time spent in general, supportive conversation, pt was appropriate to some things and totally confused with other topics. Pt states no questions or needs. Emotional support given. Writer did not discuss code status , as pt does not have capacity for decisions needing that amount of insight. Will continue to follow to see if pt becomes more mentally clear. Will also attempt to better establish DPOA and discuss goals of care. Call placed to pt's sister,Reshma at [phone] ( number listed on DPOA form) but that number has been disconnected. Pt's nurse,GERHARD Ferris updated on above info. Plan/Follow-Up:  Continue with current plan of care. Palliative care will continue to follow and see how well pt clears mentally in the coming days and attempt to better establish DPOA and goals of care with pt, as her condition allows. Floor to notify PRN for urgent needs.          Electronically signed by Ruby Brooks RN on 12/28/2022 at 3:27 PM           Palliative Care Office: [phone]

## 2022-12-28 NOTE — PROGRESS NOTES
Physician Progress Note      PATIENT:               Tristen London  CSN #:                  [id number]  :                       1955  ADMIT DATE:       2022 11:11 AM  DISCH DATE:  Gwen Floyd  PROVIDER #:        Valery Pearce DO          QUERY TEXT:    Dr Breana Naranjo,    Pt admitted with Sepsis & PNA. Pt noted to be lethargic, disoriented and   confused. In ED: Sitter at bedside. Patient attempting to get out of bed and   take off monitors. Per RN:  Virtual nurse attempted to complete admission with   patient, patient is unable to state  and is very confused. If possible,   please document in the progress notes and discharge summary if you are   evaluating and / or treating any of the following: The medical record reflects the following:  Risk Factors: Sepsis & PNA  Clinical Indicators: Pt noted to be lethargic, disoriented and confused. . In   ED: Sitter at bedside. Patient attempting to get out of bed and take off   monitors. Per RN:  Virtual nurse attempted to complete admission with patient,   patient is unable to state  and is very confused. Treatment: Bed alarm & sitter  Options provided:  -- Metabolic encephalopathy  -- Septic encephalopathy  -- Delirium due to, Please specify cause. -- Other - I will add my own diagnosis  -- Disagree - Not applicable / Not valid  -- Disagree - Clinically unable to determine / Unknown  -- Refer to Clinical Documentation Reviewer    PROVIDER RESPONSE TEXT:    This patient has metabolic encephalopathy.     Query created by: Doris Nixon on 2022 9:17 AM      Electronically signed by:  Valery Pearce DO 2022 4:30 PM done

## 2022-12-28 NOTE — PROGRESS NOTES
Virtual nurse attempted to complete admission with patient. Bedside nurse is in the room. Patient is unable to state  and is very confused and bedside nurse stated she will complete admission.

## 2022-12-28 NOTE — PROGRESS NOTES
Pt admitted to  [phone] from ED. Complaints: Shortness of breath. Vital signs obtained. Assessment and data collection initiated. Two nurse skin assessment performed by Radha Zaidi RN and Fely Blevins RN. Oriented to room. Policies and procedures for [facility] explained. All questions answered with no further questions at this time. Fall prevention and safety brochure discussed with patient. Bed alarm on. Call light in reach.

## 2022-12-28 NOTE — PLAN OF CARE
Problem: Respiratory - Adult  Goal: Achieves optimal ventilation and oxygenation  Outcome: Progressing     Problem: Respiratory - Adult  Goal: Clear lung sounds  Outcome: Progressing     Continue breathing treatments to improve breath sounds. Patient on HFNC to support oxygenation. Wean per protocol. Patient mutually agreed on goals.

## 2022-12-28 NOTE — PROGRESS NOTES
Pharmacy Enoxaparin Consult    Ashley Aaron is a 79 y.o. female. Pharmacy has been consulted to dose enoxaparin per P&T protocol. Recent Labs     12/27/22  1129   BUN 19   CREATININE 1.3*   HGB 12.4        Height:   Ht Readings from Last 1 Encounters:   08/25/22 5' 7\" (1.702 m)     Weight:  Wt Readings from Last 1 Encounters:   12/27/22 230 lb 12.8 oz (104.7 kg)     BMI: Body mass index is 36.15 kg/m². Estimated Creatinine Clearance: 52 mL/min (A) (based on SCr of 1.3 mg/dL (H)).     Enoxaparin Indication: DVT prophylaxis    Plan:   Begin enoxaparin 30 mg SC every 12 hours for weight of 104.7 kg

## 2022-12-28 NOTE — CARE COORDINATION
12/28/22 1101   Service Assessment   Patient Orientation Alert and Oriented   Cognition Alert   History Provided By Patient;Medical Record   Primary Caregiver Self   Accompanied By/Relationship none   Support Systems Friends/Neighbors   Patient's Healthcare Decision Maker is: Named in Scanned ACP Document   PCP Verified by CM Yes   Last Visit to PCP Within last 3 months   Prior Functional Level Independent in ADLs/IADLs   Current Functional Level Independent in ADLs/IADLs   Can patient return to prior living arrangement Yes   Ability to make needs known: Good   Family able to assist with home care needs: Yes   Would you like for me to discuss the discharge plan with any other family members/significant others, and if so, who?  Yes  (friend Anushka)   Financial Resources Medicare   Community Resources None   CM/SW Referral ADLs/IADLs   Social/Functional History   Lives With Alone   Type of Home Apartment   Home Layout One level   Home Access Level entry   Bathroom Shower/Tub Tub/Shower unit   Bathroom Toilet Standard   Bathroom Equipment Grab bars in shower   [facility], [address] [place] [address] Help From Friend(s)   ADL Assistance Independent   Homemaking Assistance Needs assistance   Laundry Total   Vacuuming Total   Cleaning Total   Driving Total   Shopping Total   Ambulation Assistance Needs assistance   Transfer Assistance Independent   Active  No   Mode of Transportation Bus   Occupation Retired   Discharge Planning   Type of [address] Prior To Admission [address]   Current DME Prior to [place] [address] Medications No   Type of [facility]   Patient expects to be discharged to: Apartment   Follow Up Appointment: Best Day/Time    (either)   Services At/After Discharge   Transition of Care Consult (CM Consult) [address] [place] [address] At/After Discharge Home Health;Nursing services   [employer] Information Provided? No   Confirm Follow Up Transport Friends   Condition of Participation: Discharge Planning   The Plan for Transition of Care is related to the following treatment goals: COPD treatment   Freedom of Choice list was provided with basic dialogue that supports the patient's individualized plan of care/goals, treatment preferences, and shares the quality data associated with the providers?   No

## 2022-12-28 NOTE — PROGRESS NOTES
[facility] ICU STEPDOWN TELEMETRY 4K  EVALUATION    Time:   Time In: 1401  Time Out: 1432  Timed Code Treatment Minutes: 23 Minutes  Minutes: 31          Date: 2022  Patient Name: Anh Momin,   Gender: female      MRN: [id number]  : 1955  (79 y.o.)  Referring Practitioner: Arely Bey DO  Diagnosis: Hypoxia  Additional Pertinent Hx: Anh Momin is a 79 y.o. female with PMHx of hypertension, multiple cirrhosis, hypothyroidism, GERD, HLD, schizoaffective disorder, tobacco use disorder and CKD 3 who presented to [address] [place] [address] with complaint of shortness of breath but was not entirely clear about her symptoms of concern. Patient is visibly disheveled appearing unbathed and not well kept. Patient reports that she believes she may have the flu but denies vaccination. She reports using home oxygen at 3 L/min via nasal cannula and requiring additional oxygen support from her home pulse oximetry. She has associated symptoms of chills but was uncertain about fever. The remainder of the exam was inhibited by the patient's mental status as she was lethargic but easily rousable while intermittently falling asleep. Within the emergency department the patient was unable to maintain appropriate oxygen saturations and required escalating supplemental oxygen via nasal cannula which was subsequently escalated to high flow nasal cannula. She was administered 1 dose of ceftriaxone and doxycycline as well as 1 DuoNeb treatment. Restrictions/Precautions:  Restrictions/Precautions: General Precautions, Fall Risk    Subjective  Chart Reviewed: Yes, Orders, Progress Notes  Patient assessed for rehabilitation services?: Yes    Subjective: RN okayed session. Pt was up in a recliner upon arrival, pleasant and agreeable to OT. Pain: Denies pain    Vitals: Oxygen: on HFNC 40L @ 40%. Pt dropping to 90% with short distance mobility and standing. Pt quickly recovers to ~95% with seated rest break      Social/Functional History:  Lives With: Alone  Type of Home: Apartment  Home Layout: One level  Home Access: Level entry  Home Equipment: Walker, rolling, Cane, quad   Bathroom Shower/Tub: Tub/Shower unit  Bathroom Toilet: Handicap height  Bathroom Equipment: Grab bars in shower  Bathroom Accessibility: Accessible  IADL Comments: Pt has been using frozen microwave meals, has someone else assist with laundry, cleaning, ect. Receives Help From: Friend(s)  ADL Assistance: Independent  Homemaking Assistance: Needs assistance  Ambulation Assistance: Independent  Transfer Assistance: Independent    Active : No  Mode of Transportation: Bus  Occupation: Retired  Additional Comments: Pt reports using a quad cane at baseline. VISION:WFL    HEARING:  WFL    COGNITION: Decreased Insight, Decreased Problem Solving, Decreased Safety Awareness, and Impulsive    RANGE OF MOTION:  Right Upper Extremity: Impaired - able to actively achieve ~100 degrees  Left Upper Extremity:  Impaired - able to actively achieve ~80 degrees    STRENGTH:  Right Upper Extremity: Impaired - grossly 3/5  Left Upper Extremity:  Impaired - grossly 3/5    SENSATION:   WFL    ADL:   Toileting: Minimal Assistance. For clothing mgmt  Toilet Transfer: [address] To/from [facility] d/t limited distance allowed by [facility] . BALANCE:  Sitting Balance:  Stand By Assistance. Standing Balance: Contact Guard Assistance. With BUE support on RW    BED MOBILITY:  Not Tested    TRANSFERS:  Sit to Stand:  Contact Guard Assistance. From recliner,  for hand placement  Stand to Sit: [address] To recliner,  for hand placement    FUNCTIONAL MOBILITY:  Assistive Device: Rolling Walker and None  Assist Level:  Contact Guard Assistance - Min A  Distance:  6' forward and retro x2 trials and to/from [facility]  Improved stability noted with RW. Slightly unsteady without AD.  No LOB Exercise:  Patient completed BUE strengthening exercises with skilled education on HEP: completed x10 reps x1 set with a light resistance band in all joints and all planes in order to improve UE strength and activity tolerance required for BADL routine and toilet / shower transfers. Patient tolerated fair, requiring mi rest breaks. Patient also required moderate cues for technique. Activity Tolerance:  Patient tolerance of  treatment: good. Assessment:  Assessment: Pt presented with the listed deficits s/p admission with COPD exacerbation. Pt with decreased strength, endurance, and activity tolerance and currently requires increased A for ADLs and IADLs. Pt would benefit from continued OT to restore PLOF maximize pt's indep with ADLs and IADLs in prep for a safe return home at max level of indep. Performance deficits / Impairments: Decreased functional mobility , Decreased endurance, Decreased posture, Decreased ADL status, Decreased balance, Decreased strength, Decreased high-level IADLs, Decreased safe awareness  Prognosis: Fair  REQUIRES OT FOLLOW-UP: Yes  Decision Making: Medium Complexity    Treatment Initiated: Treatment and education initiated within context of evaluation. Evaluation time included review of current medical information, gathering information related to past medical, social and functional history, completion of standardized testing, formal and informal observation of tasks, assessment of data and development of plan of care and goals. Treatment time included skilled education and facilitation of tasks to increase safety and independence with ADL's for improved functional independence and quality of life.     Discharge Recommendations:  Continue to assess pending progress (Pt may benefit from short inpatient therapy stay prior to discharge home)    Patient Education:     Patient Education  Education Given To: Patient  Education Provided: Role of Therapy, Plan of Care, Precautions, ADL Adaptive Strategies, Transfer Training, IADL Safety  Education Method: Demonstration, Verbal  Barriers to Learning: None  Education Outcome: Demonstrated understanding, Verbalized understanding    Equipment Recommendations: Other: monitor need for [facility], shower chair    Plan:  Times Per Week: 5x  Current Treatment Recommendations: Strengthening, Balance training, Functional mobility training, Endurance training, Safety education & training, Patient/Caregiver education & training, Self-Care / ADL, Return to work related activity. See long-term goal time frame for expected duration of plan of care. If no long-term goals established, a short length of stay is anticipated. Goals:  Patient goals : get stronger, steadier, and just get better  Short Term Goals  Time Frame for Short Term Goals: by discharge  Short Term Goal 1: Pt will safely navigate to/from bathroom with LRAE and SBA for increased indep with ADLs  Short Term Goal 2: Pt will tolerate dynamic standing x5-6min with SBA and B hand release to increase indep with grooming  Short Term Goal 3: Pt will complete BADL routine with Min A and min VC for ECT to increase indep with self care  Short Term Goal 4: Pt will verbalize >/= 3 ECT for use during ADL routine to increase activity tolerance during self care routine         Following session, patient left in safe position with all fall risk precautions in place.

## 2022-12-29 LAB
AEROBIC CULTURE: NORMAL
ANION GAP SERPL CALCULATED.3IONS-SCNC: 10 MEQ/L (ref 8–16)
BASOPHILS # BLD: 0.1 %
BASOPHILS ABSOLUTE: 0 THOU/MM3 (ref 0–0.1)
BUN BLDV-MCNC: 23 MG/DL (ref 7–22)
CALCIUM SERPL-MCNC: 8.3 MG/DL (ref 8.5–10.5)
CHLORIDE BLD-SCNC: 101 MEQ/L (ref 98–111)
CO2: 26 MEQ/L (ref 23–33)
CREAT SERPL-MCNC: 0.7 MG/DL (ref 0.4–1.2)
EOSINOPHIL # BLD: 0.1 %
EOSINOPHILS ABSOLUTE: 0 THOU/MM3 (ref 0–0.4)
ERYTHROCYTE [DISTWIDTH] IN BLOOD BY AUTOMATED COUNT: 14.9 % (ref 11.5–14.5)
ERYTHROCYTE [DISTWIDTH] IN BLOOD BY AUTOMATED COUNT: 47.2 FL (ref 35–45)
GFR SERPL CREATININE-BSD FRML MDRD: > 60 ML/MIN/1.73M2
GLUCOSE BLD-MCNC: 94 MG/DL (ref 70–108)
HCT VFR BLD CALC: 40.8 % (ref 37–47)
HEMOGLOBIN: 12.9 GM/DL (ref 12–16)
IMMATURE GRANS (ABS): 0.06 THOU/MM3 (ref 0–0.07)
IMMATURE GRANULOCYTES: 0.4 %
LYMPHOCYTES # BLD: 3 %
LYMPHOCYTES ABSOLUTE: 0.4 THOU/MM3 (ref 1–4.8)
MCH RBC QN AUTO: 27.5 PG (ref 26–33)
MCHC RBC AUTO-ENTMCNC: 31.6 GM/DL (ref 32.2–35.5)
MCV RBC AUTO: 87 FL (ref 81–99)
MONOCYTES # BLD: 5.9 %
MONOCYTES ABSOLUTE: 0.8 THOU/MM3 (ref 0.4–1.3)
NUCLEATED RED BLOOD CELLS: 0 /100 WBC
PLATELET # BLD: 279 THOU/MM3 (ref 130–400)
PMV BLD AUTO: 9.2 FL (ref 9.4–12.4)
POTASSIUM REFLEX MAGNESIUM: 4.2 MEQ/L (ref 3.5–5.2)
RBC # BLD: 4.69 MILL/MM3 (ref 4.2–5.4)
SEG NEUTROPHILS: 90.5 %
SEGMENTED NEUTROPHILS ABSOLUTE COUNT: 12.8 THOU/MM3 (ref 1.8–7.7)
SODIUM BLD-SCNC: 137 MEQ/L (ref 135–145)
WBC # BLD: 14.1 THOU/MM3 (ref 4.8–10.8)

## 2022-12-29 PROCEDURE — 6360000002 HC RX W HCPCS: Performed by: PHARMACIST

## 2022-12-29 PROCEDURE — 85025 COMPLETE CBC W/AUTO DIFF WBC: CPT

## 2022-12-29 PROCEDURE — 2700000000 HC OXYGEN THERAPY PER DAY

## 2022-12-29 PROCEDURE — 2060000000 HC ICU INTERMEDIATE R&B

## 2022-12-29 PROCEDURE — 97110 THERAPEUTIC EXERCISES: CPT

## 2022-12-29 PROCEDURE — 2580000003 HC RX 258: Performed by: STUDENT IN AN ORGANIZED HEALTH CARE EDUCATION/TRAINING PROGRAM

## 2022-12-29 PROCEDURE — 6370000000 HC RX 637 (ALT 250 FOR IP): Performed by: STUDENT IN AN ORGANIZED HEALTH CARE EDUCATION/TRAINING PROGRAM

## 2022-12-29 PROCEDURE — 94640 AIRWAY INHALATION TREATMENT: CPT

## 2022-12-29 PROCEDURE — 6360000002 HC RX W HCPCS: Performed by: STUDENT IN AN ORGANIZED HEALTH CARE EDUCATION/TRAINING PROGRAM

## 2022-12-29 PROCEDURE — 80048 BASIC METABOLIC PNL TOTAL CA: CPT

## 2022-12-29 PROCEDURE — 94669 MECHANICAL CHEST WALL OSCILL: CPT

## 2022-12-29 PROCEDURE — 97116 GAIT TRAINING THERAPY: CPT

## 2022-12-29 PROCEDURE — 94761 N-INVAS EAR/PLS OXIMETRY MLT: CPT

## 2022-12-29 PROCEDURE — 36415 COLL VENOUS BLD VENIPUNCTURE: CPT

## 2022-12-29 PROCEDURE — 97535 SELF CARE MNGMENT TRAINING: CPT

## 2022-12-29 PROCEDURE — 97162 PT EVAL MOD COMPLEX 30 MIN: CPT

## 2022-12-29 RX ADMIN — PREDNISONE 40 MG: 20 TABLET ORAL at 08:16

## 2022-12-29 RX ADMIN — CEFTRIAXONE SODIUM 1000 MG: 1 INJECTION, POWDER, FOR SOLUTION INTRAMUSCULAR; INTRAVENOUS at 16:22

## 2022-12-29 RX ADMIN — AZITHROMYCIN DIHYDRATE 250 MG: 500 INJECTION, POWDER, LYOPHILIZED, FOR SOLUTION INTRAVENOUS at 14:52

## 2022-12-29 RX ADMIN — IPRATROPIUM BROMIDE AND ALBUTEROL SULFATE 1 AMPULE: .5; 3 SOLUTION RESPIRATORY (INHALATION) at 16:00

## 2022-12-29 RX ADMIN — IPRATROPIUM BROMIDE AND ALBUTEROL SULFATE 1 AMPULE: .5; 3 SOLUTION RESPIRATORY (INHALATION) at 04:12

## 2022-12-29 RX ADMIN — IPRATROPIUM BROMIDE AND ALBUTEROL SULFATE 1 AMPULE: .5; 3 SOLUTION RESPIRATORY (INHALATION) at 12:14

## 2022-12-29 RX ADMIN — SODIUM CHLORIDE, PRESERVATIVE FREE 10 ML: 5 INJECTION INTRAVENOUS at 08:16

## 2022-12-29 RX ADMIN — ENOXAPARIN SODIUM 30 MG: 100 INJECTION SUBCUTANEOUS at 19:30

## 2022-12-29 RX ADMIN — IPRATROPIUM BROMIDE AND ALBUTEROL SULFATE 1 AMPULE: .5; 3 SOLUTION RESPIRATORY (INHALATION) at 08:20

## 2022-12-29 RX ADMIN — SODIUM CHLORIDE, PRESERVATIVE FREE 10 ML: 5 INJECTION INTRAVENOUS at 19:29

## 2022-12-29 RX ADMIN — ENOXAPARIN SODIUM 30 MG: 100 INJECTION SUBCUTANEOUS at 08:16

## 2022-12-29 NOTE — RT PROTOCOL NOTE
RT Inhaler-Nebulizer Bronchodilator Protocol Note    There is a bronchodilator order in the chart from a provider indicating to follow the RT Bronchodilator Protocol and there is an Initiate RT Inhaler-Nebulizer Bronchodilator Protocol order as well (see protocol at bottom of note). CXR Findings:  No results found. The findings from the last RT Protocol Assessment were as follows:   History Pulmonary Disease: Chronic pulmonary disease  Respiratory Pattern: Mild dyspnea at rest, irregular pattern, or RR 21-25 bpm  Breath Sounds: Inspiratory and expiratory or bilateral wheezing and/or rhonchi  Cough: Strong, spontaneous, non-productive  Indication for Bronchodilator Therapy: Decreased or absent breath sounds, Wheezing associated with pulm disorder  Bronchodilator Assessment Score: 12    Aerosolized bronchodilator medication orders have been revised according to the RT Inhaler-Nebulizer Bronchodilator Protocol below. Respiratory Therapist to perform RT Therapy Protocol Assessment initially then follow the protocol. Repeat RT Therapy Protocol Assessment PRN for score 0-3 or on second treatment, BID, and PRN for scores above 3. No Indications - adjust the frequency to every 6 hours PRN wheezing or bronchospasm, if no treatments needed after 48 hours then discontinue using Per Protocol order mode. If indication present, adjust the RT bronchodilator orders based on the Bronchodilator Assessment Score as indicated below. Use Inhaler orders unless patient has one or more of the following: on home nebulizer, not able to hold breath for 10 seconds, is not alert and oriented, cannot activate and use MDI correctly, or respiratory rate 25 breaths per minute or more, then use the equivalent nebulizer order(s) with same Frequency and PRN reasons based on the score. If a patient is on this medication at home then do not decrease Frequency below that used at home.     0-3 - enter or revise RT bronchodilator order(s) to equivalent RT Bronchodilator order with Frequency of every 4 hours PRN for wheezing or increased work of breathing using Per Protocol order mode. 4-6 - enter or revise RT Bronchodilator order(s) to two equivalent RT bronchodilator orders with one order with BID Frequency and one order with Frequency of every 4 hours PRN wheezing or increased work of breathing using Per Protocol order mode. 7-10 - enter or revise RT Bronchodilator order(s) to two equivalent RT bronchodilator orders with one order with TID Frequency and one order with Frequency of every 4 hours PRN wheezing or increased work of breathing using Per Protocol order mode. 11-13 - enter or revise RT Bronchodilator order(s) to one equivalent RT bronchodilator order with QID Frequency and an Albuterol order with Frequency of every 4 hours PRN wheezing or increased work of breathing using Per Protocol order mode. Greater than 13 - enter or revise RT Bronchodilator order(s) to one equivalent RT bronchodilator order with every 4 hours Frequency and an Albuterol order with Frequency of every 2 hours PRN wheezing or increased work of breathing using Per Protocol order mode. RT to enter RT Home Evaluation for COPD & MDI Assessment order using Per Protocol order mode.     Electronically signed by Alessandro Santiago RCP on 12/28/2022 at 8:30 PM

## 2022-12-29 NOTE — CARE COORDINATION
DISCHARGE PLANNING EVALUATION  12/29/22, 8:24 AM EST    Reason for Referral: Discharge planning  Mental Status: Alert and Oriented  Decision Making: Self  Family/Social/Home Environment: Patient lives alone but reported to have supportive neighbors. Current Services including food security, transportation and housekeeping: Patient is current with Passport services and has a RN, Aide and transportation. Current Equipment: unknown  Payment Source: [employer] Medicare and Medicaid  Concerns or Barriers to Discharge: None  Post-acute [facility]) provider list was provided to patient. Patient was informed of their freedom to choose [facility] provider. Discussed and offered to show the patient the relevant [facility] Providers quality and resource use measures on Medicare Compare web site via computer based on patient's goals of care and treatment preferences. Questions regarding selection process were answered. Teach Back Method used with patient regarding care plan and needs. Patient verbalize understanding of the plan of care and contribute to goal setting. Patient goals, treatment preferences and discharge plan: Patient plans to return home at discharge with current Passport and services. SW offered ECF for short term rehab at a skilled nursing facility. Patient refused ECF and reported that she has too many things at home she needs to take care of.     SW called and left voicemail for [employer], [facility], and requested a call back.      Electronically signed by GURPREET Ferrari on 12/29/2022 at 8:24 AM

## 2022-12-29 NOTE — PLAN OF CARE
Problem: Respiratory - Adult  Goal: Achieves optimal ventilation and oxygenation  12/29/2022 0955 by Lupe Johnson RN  Outcome: Progressing    Achieves optimal ventilation and oxygenation:   Assess for changes in respiratory status   Assess for changes in mentation and behavior   Position to facilitate oxygenation and minimize respiratory effort   Oxygen supplementation based on oxygen saturation or arterial blood gases   Encourage broncho-pulmonary hygiene including cough, deep breathe, incentive spirometry   Assess and instruct to report shortness of breath or any respiratory difficulty   Respiratory therapy support as indicated  Outcome: Not Progressing  Outcome: Progressing  Goal: Clear lung sounds  12/29/2022 0955 by Lupe Johnson RN  Outcome: Progressing  Note: Cough & deep breathe   Outcome: Not Progressing      Care plan reviewed with patient. Patient verbalizes understanding of the plan of care and contributes to goal setting.

## 2022-12-29 NOTE — PLAN OF CARE
Problem: Respiratory - Adult  Goal: Achieves optimal ventilation and oxygenation  12/29/2022 0426 by Geeta Munoz RCP  Outcome: Not Progressing     Problem: Respiratory - Adult  Goal: Clear lung sounds  12/29/2022 0426 by Geeta Glocolleen, RCP  Outcome: Not Progressing   Patient mutually agrees upon goals. Continue with tx's as directed and continue to attempt to wean pt off of HFNC.

## 2022-12-29 NOTE — PLAN OF CARE
Problem: Respiratory - Adult  Goal: Clear lung sounds  Outcome: Not Progressing     Problem: Respiratory - Adult  Goal: Achieves optimal ventilation and oxygenation  Outcome: Progressing      Patient mutually agrees upon goals.
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
## 2022-12-29 NOTE — PALLIATIVE CARE
Follow Up / Progress Note        Patient:   Chani Brady  YOB: 1955  Age:  79 y.o. Room:  [facility]18/018-  MRN:  [id number]            Family/Patient Discussion: In patient's room to further discuss emergency contacts. Patient had piece of paper with written number for her sister Adelita Rivera. Number that patient gave was the number listed in electronic chart for her neighbor Anushka [phone]. Attempted to call the number patient gave this RN. No answer, voicemail left for call back. Call made to patient's primary. Office able to give different number for Adelita Rivera. Office also had number for patient's  at [facility] (did not have her last name). Patient did agree that she would continue to want Adelita Rivera to be her DPOA in the event she could not make decisions for herself. Patient stated [facility] would be her secondary contact. Patient agreeable to have new POA paperwork made while admitted. Will attempt to ensure the patient has the correct phone numbers prior to placing spiritual care consult. Oscar Meehan [address] [facility]    Call made to Adelita Rivera. No answer x2, call was ended and was unable to leave voicemail. Anushka returned this RN's call. Anushka stated she would not want to be named as a patient's POA. Anushka stated that the patient's sister, Adelita Rivera, is currently diagnosed with cancer and is not doing well. Anushka stated that Adelita Rivera will not answer the phone due to her condition. Anushka stated that the patient has a mother, who is 80years old with issues herself, and would be unable to make decisions. Patient and her brother do not get along and he would also not want to make medical decisions for her. Anushka stating she is all patient has in regards to friends or other relatives. Understands patient is in need of a POA but is unsure who would be able. Updated Anushka on patient's current condition.  Anushka stating the patient would benefit from nursing home placement, but has a dog that she would be unwilling to part with. Anushka stated the patient sits in a chair all day and only gets up to go to the restroom or fix something to eat. Anushka appreciative of update, denied further questions at this time. Returned to patient's room. Updated her that this RN was unable to get in touch with her sister Kenisha Turner. Also informed patient that Anushka would not want to be responsible for making medical decisions if it were necessary. Patient understood. Informed patient that the hospital would go by the hierarchy of surrogate decision makers, which would then fall to her mom and brother. Patient stated her mother is not in good health herself and is unsure she would be able to understand anything. This RN stated it was understood that she did not get along well with her brother, but that is who would be next to make decisions. Patient understood. Patient agreeable to this if it were absolutely necessary. Patient did not have her mother or brother's phone numbers, but stated Anushka would have them. Call made back to Anushka. Anushka was able to give this Rn the patient's mother's phone number. Anushka did not have a separate number for patient's brother, Sharath Junior. Anushka also did not know patient's mother's name. Anushka stated that the patient's mother and Ed live in [place]. Patient's mother- [phone]    Attempted to call patient's mother. No answer at this time. Was unable to leave voicemail.  Phone number was added to patient's emergency contact list.          Electronically signed by Lawyer Terrell RN on 12/29/2022 at 9:29 AM             Palliative Care Office: [phone]

## 2022-12-29 NOTE — FLOWSHEET NOTE
12/29/22 1122   Safe Environment   Safety Measures Other (comment)  (Virtual nurse round complete)   Camera turned on to check patient safety. Patient sitting up in chair. Staff at bedside. No needs identified at this time.

## 2022-12-29 NOTE — PROGRESS NOTES
Hospitalist Progress Note      Patient:  Anh Momin    Unit/Bed:4K-18/018-A  YOB: 1955  MRN: [id number]   Acct: [de-identified]   PCP: BRIGETTE Leija CNP  Date of Admission: 12/27/2022    Assessment/Plan:    Sepsis, POA  CAP  Given new pulmonary infiltrates on CXR and CTA, will treat as CAP  Continue Rocephin/azithromycin  Sputum cultures, PNA PCR, and blood cultures pending  AECOPD  Likely due to #2  Continue symbicort and prednisone  Duonebs TID and prn  Acute on chronic hypoxic respiratory failure  Due to #2 and 3  Baseline oxygen requirements of 3L NC. Was able to tolerate 6L NC yesterday evening but was placed back on HHFNC this overnight. Will wean if able today. ABRAN on CKD IIIa, resolved. Hold NSAIDs  Avoid nephrotoxic agents  Multiple sclerosis  On Vumerity, previously on Tecfidera  HTN  Lopressor held for hypotension on arrival.   Hypothyroidism  On levothyroxine  GERD  PPI held due to ABRAN. Ok to resume. HLD  On statin  Schizoaffective d/o with bipolar and anxiety  Will need an accurate medication review prior to reinitiating her home medications as she is unable to list her medications. Disposition: Likely discharge home in 24-48 hours. Planning home with [facility]. Declined SNF. Chief Complaint: SOB    Hospital Course:    Per H&P - Anh Momin is a 79 y.o. female with PMHx of hypertension, multiple cirrhosis, hypothyroidism, GERD, HLD, schizoaffective disorder, tobacco use disorder and CKD 3 who presented to [facility] with complaint of shortness of breath but was not entirely clear about her symptoms of concern. Patient is visibly disheveled appearing unbathed and not well kept. Patient reports that she believes she may have the flu but denies vaccination. She reports using home oxygen at 3 L/min via nasal cannula and requiring additional oxygen support from her home pulse oximetry.  She has associated symptoms of chills but was uncertain about fever. The remainder of the exam was inhibited by the patient's mental status as she was lethargic but easily rousable while intermittently falling asleep. Within the emergency department the patient was unable to maintain appropriate oxygen saturations and required escalating supplemental oxygen via nasal cannula which was subsequently escalated to high flow nasal cannula. She was administered 1 dose of ceftriaxone and doxycycline as well as 1 DuoNeb treatment. 12/28: weaned to 6L NC but with desaturations overnight and placed back on HHFNC. Subjective (past 24 hours):   Doing ok today. Still having cough and mild to moderate SOB. Overnight noted to have hypoxia and ws placed back on HHFN from 6L NC. Past medical history, family history, social history and allergies reviewed again and is unchanged since admission. ROS (12 point review of systems completed. Pertinent positives noted. Otherwise ROS is negative)     Medications:  Reviewed    Infusion Medications    sodium chloride       Scheduled Medications    ipratropium-albuterol  1 ampule Inhalation Q4H WA    sodium chloride flush  5-40 mL IntraVENous 2 times per day    predniSONE  40 mg Oral Daily    enoxaparin  30 mg SubCUTAneous Q12H    cefTRIAXone (ROCEPHIN) IV  1,000 mg IntraVENous Q24H    azithromycin  250 mg IntraVENous Q24H     PRN Meds: sodium chloride flush, sodium chloride, ondansetron **OR** ondansetron, polyethylene glycol, acetaminophen **OR** acetaminophen, ipratropium-albuterol      Intake/Output Summary (Last 24 hours) at 12/29/2022 0930  Last data filed at 12/29/2022 8911  Gross per 24 hour   Intake 300 ml   Output 750 ml   Net -450 ml         Diet:  ADULT DIET;  Regular; Low Fat/Low Chol/High Fiber/KENNY    Exam:  /86   Pulse 90   Temp 98.8 °F (37.1 °C) (Oral)   Resp 22   Ht 5' 6\" (1.676 m)   Wt 226 lb 9.6 oz (102.8 kg)   SpO2 97%   BMI 36.57 kg/m² General appearance: No apparent distress, appears stated age and cooperative. HEENT: Pupils equal, round, and reactive to light. Conjunctivae/corneas clear. Neck: Supple, with full range of motion. No jugular venous distention. Trachea midline. Respiratory:  Diminished breath sounds throughout all lung fields. Trace wheezes. No rhonchi. Cardiovascular: Regular rate and rhythm with normal S1/S2 without murmurs, rubs or gallops. Abdomen: Soft, non-tender, non-distended with normal bowel sounds. Musculoskeletal: passive and active ROM x 4 extremities. Skin: Skin color, texture, turgor normal.  No rashes or lesions. Neurologic:  Neurovascularly intact without any focal sensory/motor deficits. Cranial nerves: II-XII intact, grossly non-focal.  Psychiatric: Alert and oriented, thought content appropriate  Capillary Refill: Brisk,< 3 seconds   Peripheral Pulses: +2 palpable, equal bilaterally     Labs:   Recent Labs     12/27/22  1129 12/28/22  0409 12/29/22  0427   WBC 15.6* 14.3* 14.1*   HGB 12.4 12.6 12.9   HCT 39.2 40.4 40.8    271 279       Recent Labs     12/27/22  1129 12/27/22  1246 12/28/22  0409 12/29/22  0427   *  --  136 137   K 4.1  --  4.6 4.2   CL 95*  --  98 101   CO2 26  --  28 26   BUN 19  --  21 23*   CREATININE 1.3*  --  0.9 0.7   CALCIUM 8.7  --  8.7 8.3*   PHOS  --  3.5  --   --        Recent Labs     12/27/22  1246   AST 12   ALT 8*   BILIDIR <0.2   BILITOT 0.4   ALKPHOS 99       No results for input(s): INR in the last 72 hours. No results for input(s): Adonica Hoose in the last 72 hours. Microbiology:    Blood culture #1:   Lab Results   Component Value Date/Time    BC No growth 24 hours.  12/27/2022 04:45 PM       Blood culture #2:No results found for: Mark Singh    Organism:  Lab Results   Component Value Date/Time    ORG Staphylococcus aureus 02/15/2022 11:00 AM         Lab Results   Component Value Date/Time    LABGRAM  02/15/2022 11:00 AM     No segmented neutrophils observed. Few epithelial cells observed. Many gram positive cocci occurring singly and in pairs. Many gram negative bacilli. Few gram positive bacilli. MRSA culture only:No results found for: [facility]    Urine culture:   Lab Results   Component Value Date/Time    LABURIN No growth-preliminary 12/27/2022 05:20 PM       Respiratory culture: No results found for: CULTRESP    Aerobic and Anaerobic :  Lab Results   Component Value Date/Time    LABAERO No Acinetobacter species isolated 12/27/2022 08:30 PM     No results found for: LABANAE    Urinalysis:      Lab Results   Component Value Date/Time    NITRU NEGATIVE 12/27/2022 05:20 PM    WBCUA 0-2 12/27/2022 05:20 PM    BACTERIA FEW 12/27/2022 05:20 PM    RBCUA 0-2 12/27/2022 05:20 PM    BLOODU NEGATIVE 12/27/2022 05:20 PM    SPECGRAV >1.030 12/27/2022 05:20 PM    GLUCOSEU NEGATIVE 12/25/2018 08:00 PM       Radiology:  CTA CHEST W WO CONTRAST   Final Result       1. No pulmonary embolism   2. Bilateral lower lobe airspace infiltrates   3. Scattered tree-in-bud opacities in the right upper lobe consistent with infectious or inflammatory etiology. **This report has been created using voice recognition software. It may contain minor errors which are inherent in voice recognition technology. **      Final report electronically signed by Dr. Naveen Mi on 12/27/2022 2:52 PM      XR CHEST (2 VW)   Final Result   1. Normal heart size. 2. Mild left basilar atelectasis/pneumonia. No effusion is seen. 3. Questionable additional patch of mild infiltrate left midlung laterally. **This report has been created using voice recognition software. It may contain minor errors which are inherent in voice recognition technology. **      Final report electronically signed by Dr. Dominick Robles on 12/27/2022 11:48 AM        XR CHEST (2 VW)    Result Date: 12/27/2022  PROCEDURE: XR CHEST (2 VW) CLINICAL INFORMATION: sob COMPARISON: 10/10/2018 TECHNIQUE: PA and lateral views of the chest were obtained. 1. Normal heart size. 2. Mild left basilar atelectasis/pneumonia. No effusion is seen. 3. Questionable additional patch of mild infiltrate left midlung laterally. **This report has been created using voice recognition software. It may contain minor errors which are inherent in voice recognition technology. ** Final report electronically signed by Dr. Estella Jackson on 12/27/2022 11:48 AM    CTA CHEST W WO CONTRAST    Result Date: 12/27/2022  PROCEDURE: CTA CHEST W WO CONTRAST CLINICAL INFORMATION: rule out pe. Short of breath COMPARISON: 5/25/2011 TECHNIQUE: 3 mm axial images were obtained through the chest after the administration of IV contrast.  A non-contrast localizer was obtained. 3D reconstructions were performed on the scanner to include MIP coronal and sagittal images through the chest. Isovue was the intravenous contrast utilized. All CT scans at this facility use dose modulation, iterative reconstruction, and/or weight-based dosing when appropriate to reduce radiation dose to as low as reasonably achievable. FINDINGS: There is adequate opacification of the pulmonary arterial system. No pulmonary emboli are present. The aorta is within acceptable limits. The heart size is normal. There is no pericardial or pleural effusion. Multiple nonenlarged mediastinal lymph nodes similar to the prior exam. Calcified subcarinal nodes. Evaluation of the lungs is limited by motion and streak artifact. There are tree-in-bud opacities in the lateral right upper lobe. There are bilateral lower lobe airspace infiltrates. There is no associated effusion. No suspicious osseous lesions are present. There are no suspicious findings in the imaged aspects of the upper abdomen. 1. No pulmonary embolism 2. Bilateral lower lobe airspace infiltrates 3. Scattered tree-in-bud opacities in the right upper lobe consistent with infectious or inflammatory etiology.  **This report has been created using voice recognition software. It may contain minor errors which are inherent in voice recognition technology. ** Final report electronically signed by Dr. Darrick Velazquez on 12/27/2022 2:52 PM      Electronically signed by Coty Glover DO on 12/29/2022 at 9:30 AM

## 2022-12-29 NOTE — PROGRESS NOTES
[facility] ICU STEPDOWN TELEMETRY 4K  Occupational Therapy  Daily Note  Time:   Time In: 1183  Time Out: 8516  Timed Code Treatment Minutes: 27 Minutes  Minutes: 27          Date: 2022  Patient Name: Narciso Upton,   Gender: female      Room: [facility]/018-A  MRN: [id number]  : 1955  (79 y.o.)  Referring Practitioner: Shoaib Lira DO  Diagnosis: Hypoxia  Additional Pertinent Hx: Narciso Upton is a 79 y.o. female with PMHx of hypertension, multiple cirrhosis, hypothyroidism, GERD, HLD, schizoaffective disorder, tobacco use disorder and CKD 3 who presented to [address] [place] [address] with complaint of shortness of breath but was not entirely clear about her symptoms of concern. Patient is visibly disheveled appearing unbathed and not well kept. Patient reports that she believes she may have the flu but denies vaccination. She reports using home oxygen at 3 L/min via nasal cannula and requiring additional oxygen support from her home pulse oximetry. She has associated symptoms of chills but was uncertain about fever. The remainder of the exam was inhibited by the patient's mental status as she was lethargic but easily rousable while intermittently falling asleep. Within the emergency department the patient was unable to maintain appropriate oxygen saturations and required escalating supplemental oxygen via nasal cannula which was subsequently escalated to high flow nasal cannula. She was administered 1 dose of ceftriaxone and doxycycline as well as 1 DuoNeb treatment. Restrictions/Precautions:  Restrictions/Precautions: General Precautions, Fall Risk  Position Activity Restriction  Other position/activity restrictions: HFNC      SUBJECTIVE: Patient seated in bedside chair upon arrival; agreeable to therapy this date.   Patietn pleasant and cooperative    PAIN: 0/10:     Vitals: Oxygen: 96% HFNC 40L 42%    COGNITION: Decreased Insight, Decreased Problem Solving, Decreased Safety Awareness, and Impulsive    ADL:   Grooming: Minimal Assistance. Wash hair  Bathing: Contact Guard Assistance and X 1. During standing  Upper Extremity Dressing: Minimal Assistance. Lower Extremity Dressing: Maximum Assistance. Toileting: Contact Guard Assistance. Toilet Transfer: Contact Guard Assistance. Rama Freed BALANCE:  Sitting Balance:  Stand By Assistance. Standing Balance: Contact Guard Assistance. BED MOBILITY:  Not Tested    TRANSFERS:  Sit to Stand:  Stand By Assistance, X 1.    Stand to Sit: Stand By Assistance, X 1.      FUNCTIONAL MOBILITY:  Assistive Device: hand held  Assist Level:  Contact Guard Assistance and X 1. Distance:  x 2 feet bedside chair to [facility]    ADDITIONAL ACTIVITIES:  Patient completed BUE strengthening exercises with skilled education on HEP: completed x10 reps x1 set with a minimal resistance band in all joints and all planes in order to improve UE strength and activity tolerance required for BADL routine and toilet / shower transfers. Patient tolerated good, requiring minimal rest breaks. Patient also required minimal verbal/visual cues for technique. ASSESSMENT:     Activity Tolerance:  Patient tolerance of  treatment: good. Discharge Recommendations: Subacute/skilled nursing facility  Equipment Recommendations:  Other: monitor need for [facility], shower chair  Plan: Times Per Week: 5x  Current Treatment Recommendations: Strengthening, Balance training, Functional mobility training, Endurance training, Safety education & training, Patient/Caregiver education & training, Self-Care / ADL, Return to work related activity    Patient Education  Patient Education: Role of OT, Plan of Care, ADL's, IADL's, Energy Conservation, Home Exercise Program, Home Safety, and Importance of Increasing Activity    Goals  Short Term Goals  Time Frame for Short Term Goals: by discharge  Short Term Goal 1: Pt will safely navigate to/from bathroom with LRAE and SBA for increased indep with ADLs  Short Term Goal 2: Pt will tolerate dynamic standing x5-6min with SBA and B hand release to increase indep with grooming  Short Term Goal 3: Pt will complete BADL routine with Min A and min VC for ECT to increase indep with self care  Short Term Goal 4: Pt will verbalize >/= 3 ECT for use during ADL routine to increase activity tolerance during self care routine    Following session, patient left in safe position with all fall risk precautions in place.

## 2022-12-29 NOTE — CARE COORDINATION
Collaborative Discharge Planning    Nikhil Luther  :  1955  MRN:  [id number]    ADMIT DATE:  2022      Discharge Planning Discharge Planning  Type of Residence: Apartment  Living Arrangements: Alone  Support Systems: Friends/Neighbors  Current Services Prior To Admission: Durable Medical Equipment  Current DME Prior to Arrival: [address] Medications: No  Type of Home Care Services: Nursing Services  Patient expects to be discharged to[de-identified] Apartment  Follow Up Appointment: Best Day/Time :  (either)  White Board Notes /Social Work Whiteboard Notes  /Social Work Whiteboard: ; SW - Return home with Current Passport/services. Lives alone. Refusing ECF. Has oxygen 3L ATC.     Discharge Plan Home with home health  lives alone (neighbor Anushka attentive to needs but cannot care for her); current [facility], aides x3 per week 2h each, but will not have nursing as of  and patient may need another agency, has Jackelyn Sheth CM @ [phone]; has [employer] home oxygen 2-3L, CPAP; refused SNF in past; client unkept and neighbor reports patient does not bathe, history falls and is smoker; has cane, walker; therapy following; monitor LTACH needs (precert, HF oxygen weaning)  Discharge Milestones and Delays: Clinical status  COPD/PNA  History: Parkinson's, Bipolar, Schizohrenia, CVA, COPD, Smoker, Marijuana Use  HF Oxygen 40% FIO2/40L v Oxygen 6L    Update: failed HF oxygen weaning to 6L NC x2 days now; monitor Rosalinda (QL hillaryal pending, precert); will ask Attending    Update: full LTACH referral; collaborated w Attending    SIGNED:  Bashir Dalal RN   2022, 9:15 AM

## 2022-12-30 PROCEDURE — 6360000002 HC RX W HCPCS: Performed by: STUDENT IN AN ORGANIZED HEALTH CARE EDUCATION/TRAINING PROGRAM

## 2022-12-30 PROCEDURE — 97116 GAIT TRAINING THERAPY: CPT

## 2022-12-30 PROCEDURE — 6370000000 HC RX 637 (ALT 250 FOR IP): Performed by: STUDENT IN AN ORGANIZED HEALTH CARE EDUCATION/TRAINING PROGRAM

## 2022-12-30 PROCEDURE — 2060000000 HC ICU INTERMEDIATE R&B

## 2022-12-30 PROCEDURE — 94640 AIRWAY INHALATION TREATMENT: CPT

## 2022-12-30 PROCEDURE — 97530 THERAPEUTIC ACTIVITIES: CPT

## 2022-12-30 PROCEDURE — 2580000003 HC RX 258: Performed by: STUDENT IN AN ORGANIZED HEALTH CARE EDUCATION/TRAINING PROGRAM

## 2022-12-30 PROCEDURE — 94761 N-INVAS EAR/PLS OXIMETRY MLT: CPT

## 2022-12-30 PROCEDURE — 94669 MECHANICAL CHEST WALL OSCILL: CPT

## 2022-12-30 PROCEDURE — 2700000000 HC OXYGEN THERAPY PER DAY

## 2022-12-30 PROCEDURE — 97110 THERAPEUTIC EXERCISES: CPT

## 2022-12-30 PROCEDURE — 97535 SELF CARE MNGMENT TRAINING: CPT

## 2022-12-30 PROCEDURE — 6360000002 HC RX W HCPCS: Performed by: PHARMACIST

## 2022-12-30 RX ORDER — AZITHROMYCIN 250 MG/1
250 TABLET, FILM COATED ORAL EVERY 24 HOURS
Status: COMPLETED | OUTPATIENT
Start: 2022-12-30 | End: 2023-01-01

## 2022-12-30 RX ORDER — DALFAMPRIDINE 10 MG/1
10 TABLET, FILM COATED, EXTENDED RELEASE ORAL EVERY 12 HOURS
COMMUNITY

## 2022-12-30 RX ORDER — PAROXETINE HYDROCHLORIDE 20 MG/1
20 TABLET, FILM COATED ORAL EVERY MORNING
Status: DISCONTINUED | OUTPATIENT
Start: 2022-12-30 | End: 2023-01-04 | Stop reason: HOSPADM

## 2022-12-30 RX ADMIN — SODIUM CHLORIDE, PRESERVATIVE FREE 10 ML: 5 INJECTION INTRAVENOUS at 19:57

## 2022-12-30 RX ADMIN — PREDNISONE 40 MG: 20 TABLET ORAL at 07:56

## 2022-12-30 RX ADMIN — ENOXAPARIN SODIUM 30 MG: 100 INJECTION SUBCUTANEOUS at 07:54

## 2022-12-30 RX ADMIN — QUETIAPINE FUMARATE 600 MG: 400 TABLET ORAL at 20:00

## 2022-12-30 RX ADMIN — IPRATROPIUM BROMIDE AND ALBUTEROL SULFATE 1 AMPULE: .5; 3 SOLUTION RESPIRATORY (INHALATION) at 20:30

## 2022-12-30 RX ADMIN — IPRATROPIUM BROMIDE AND ALBUTEROL SULFATE 1 AMPULE: .5; 3 SOLUTION RESPIRATORY (INHALATION) at 12:45

## 2022-12-30 RX ADMIN — IPRATROPIUM BROMIDE AND ALBUTEROL SULFATE 1 AMPULE: .5; 3 SOLUTION RESPIRATORY (INHALATION) at 15:46

## 2022-12-30 RX ADMIN — METOPROLOL TARTRATE 12.5 MG: 25 TABLET, FILM COATED ORAL at 15:15

## 2022-12-30 RX ADMIN — PAROXETINE HYDROCHLORIDE 20 MG: 20 TABLET, FILM COATED ORAL at 15:15

## 2022-12-30 RX ADMIN — AZITHROMYCIN MONOHYDRATE 250 MG: 250 TABLET ORAL at 11:49

## 2022-12-30 RX ADMIN — SODIUM CHLORIDE, PRESERVATIVE FREE 10 ML: 5 INJECTION INTRAVENOUS at 07:54

## 2022-12-30 RX ADMIN — METOPROLOL TARTRATE 12.5 MG: 25 TABLET, FILM COATED ORAL at 19:57

## 2022-12-30 RX ADMIN — CEFTRIAXONE SODIUM 1000 MG: 1 INJECTION, POWDER, FOR SOLUTION INTRAMUSCULAR; INTRAVENOUS at 15:10

## 2022-12-30 RX ADMIN — ENOXAPARIN SODIUM 30 MG: 100 INJECTION SUBCUTANEOUS at 20:01

## 2022-12-30 RX ADMIN — IPRATROPIUM BROMIDE AND ALBUTEROL SULFATE 1 AMPULE: .5; 3 SOLUTION RESPIRATORY (INHALATION) at 09:00

## 2022-12-30 ASSESSMENT — PAIN SCALES - GENERAL
PAINLEVEL_OUTOF10: 0
PAINLEVEL_OUTOF10: 8

## 2022-12-30 NOTE — PROGRESS NOTES
Pharmacy Medication History Note      List of current medications patient is taking is complete. Source of information: patient and surescripts fill history    Changes made to medication list:  Medications removed (include reason, ex. therapy complete or physician discontinued):  Diroximel Fumarate - medication changed to dalfampridine  Nystatin powder    Medications added/doses adjusted: Added aripiprazole 300 mg IM injection - last dose on 11/9/22  Added dalfampridine ER 10 mg BID - patient does not think anyone would be able to bring in this med from home  Adjusted hydroxyzine per RX to prn  Adjusted dose of trazodone 100 mg 1-2 tabs HS prn per RX  Adjusted quetiapine dose to 600 mg HS  Adjusted famotidine to BID per RX and patient    Other notes (ex. Recent course of antibiotics, Coumadin dosing):  Patient's last fills for Symbicort, famotidine, metoprolol, and pantoprazole have not been for about 5 months for some meds. Patient unsure if she is out of these or still actively taking, but thinks that she is supposed to still be on these medications. Denies use of other OTC or herbal medications.       Allergies reviewed      Electronically signed by Shirley Quinn [facility] on 12/30/2022 at 2:37 PM

## 2022-12-30 NOTE — PROGRESS NOTES
Hospitalist Progress Note      Patient:  Yoko Persaud    Unit/Bed:4K-18/018-A  YOB: 1955  MRN: [id number]   Acct: [de-identified]   PCP: Chris Krabbe, APRN - CNP  Date of Admission: 12/27/2022    Assessment/Plan:    Sepsis, POA  CAP  Given new pulmonary infiltrates on CXR and CTA, will treat as CAP  Continue Rocephin/azithromycin  Sputum cultures/PNA PCR uncollected. and blood cultures ngtd  AECOPD  Likely due to #2  Continue symbicort and prednisone  Duonebs TID and prn  Acute on chronic hypoxic respiratory failure  Due to #2 and 3  Patient tolerating heated high flow nasal cannula at fairly low settings. We will transition to regular nasal cannula at 6 L today to see if she tolerates. Previous failed transition from heated high flow nasal cannula due to hypoxia. May need to consider LTAC placement for expected prolonged weaning given her underlying structural lung disease and chronic hypoxia requiring 3 L  ABRAN on CKD IIIa, resolved. Hold NSAIDs  Avoid nephrotoxic agents  Multiple sclerosis  On Vumerity, previously on Tecfidera  HTN  Lopressor held for hypotension on arrival.  Will resume today. Hypothyroidism  On levothyroxine  GERD  PPI held due to ABRAN. Ok to resume. HLD  On statin  Schizoaffective d/o with bipolar and anxiety  Ok to resume seroquel and paxil today. Holding trazodone and atarax. Will need to verify Ability rx. Disposition: Likely discharge home in 24-48 hours. Planning home with HHS. Declined SNF. May need to consider LTAC if prolonged weaning from [facility] anticipated.      Chief Complaint: SOB    Hospital Course:    Per H&P - Yoko Persaud is a 79 y.o. female with PMHx of hypertension, multiple cirrhosis, hypothyroidism, GERD, HLD, schizoaffective disorder, tobacco use disorder and CKD 3 who presented to [facility] with complaint of shortness of breath but was not entirely clear about her symptoms of concern. Patient is visibly disheveled appearing unbathed and not well kept. Patient reports that she believes she may have the flu but denies vaccination. She reports using home oxygen at 3 L/min via nasal cannula and requiring additional oxygen support from her home pulse oximetry. She has associated symptoms of chills but was uncertain about fever. The remainder of the exam was inhibited by the patient's mental status as she was lethargic but easily rousable while intermittently falling asleep. Within the emergency department the patient was unable to maintain appropriate oxygen saturations and required escalating supplemental oxygen via nasal cannula which was subsequently escalated to high flow nasal cannula. She was administered 1 dose of ceftriaxone and doxycycline as well as 1 DuoNeb treatment. 12/28: weaned to 6L NC but with desaturations overnight and placed back on HHFNC. Subjective (past 24 hours): She is doing well today. States that her breathing is improved, but is still very easily fatigued. She has been using her incentive spirometer. States her cough is somewhat improved as well. Nursing reports mentation has continued to improve. Past medical history, family history, social history and allergies reviewed again and is unchanged since admission. ROS (12 point review of systems completed. Pertinent positives noted.  Otherwise ROS is negative)     Medications:  Reviewed    Infusion Medications    sodium chloride       Scheduled Medications    azithromycin  250 mg Oral Q24H    ipratropium-albuterol  1 ampule Inhalation Q4H WA    sodium chloride flush  5-40 mL IntraVENous 2 times per day    predniSONE  40 mg Oral Daily    enoxaparin  30 mg SubCUTAneous Q12H    cefTRIAXone (ROCEPHIN) IV  1,000 mg IntraVENous Q24H     PRN Meds: sodium chloride flush, sodium chloride, ondansetron **OR** ondansetron, polyethylene glycol, acetaminophen **OR** acetaminophen, ipratropium-albuterol      Intake/Output Summary (Last 24 hours) at 12/30/2022 1319  Last data filed at 12/29/2022 1731  Gross per 24 hour   Intake 240 ml   Output 750 ml   Net -510 ml         Diet:  ADULT DIET; Regular; Low Fat/Low Chol/High Fiber/KENNY    Exam:  BP (!) 140/77   Pulse 77   Temp 99.2 °F (37.3 °C) (Oral)   Resp 16   Ht 5' 6\" (1.676 m)   Wt 226 lb 9.6 oz (102.8 kg)   SpO2 97%   BMI 36.57 kg/m²   General appearance: No apparent distress, appears stated age and cooperative. HEENT: Pupils equal, round, and reactive to light. Conjunctivae/corneas clear. Neck: Supple, with full range of motion. No jugular venous distention. Trachea midline. Respiratory:  Diminished breath sounds throughout all lung fields. Trace wheezes. No rhonchi. Cardiovascular: Regular rate and rhythm with normal S1/S2 without murmurs, rubs or gallops. Abdomen: Soft, non-tender, non-distended with normal bowel sounds. Musculoskeletal: passive and active ROM x 4 extremities. Skin: Skin color, texture, turgor normal.  No rashes or lesions. Neurologic:  Neurovascularly intact without any focal sensory/motor deficits. Cranial nerves: II-XII intact, grossly non-focal.  Psychiatric: Alert and oriented, thought content appropriate  Capillary Refill: Brisk,< 3 seconds   Peripheral Pulses: +2 palpable, equal bilaterally     Labs:   Recent Labs     12/28/22  0409 12/29/22 0427   WBC 14.3* 14.1*   HGB 12.6 12.9   HCT 40.4 40.8    279       Recent Labs     12/28/22  0409 12/29/22  0427    137   K 4.6 4.2   CL 98 101   CO2 28 26   BUN 21 23*   CREATININE 0.9 0.7   CALCIUM 8.7 8.3*       No results for input(s): AST, ALT, BILIDIR, BILITOT, ALKPHOS in the last 72 hours. No results for input(s): INR in the last 72 hours. No results for input(s): Domitila Kras in the last 72 hours. Microbiology:    Blood culture #1:   Lab Results   Component Value Date/Time    BC No growth 24 hours. No growth 48 hours. 12/27/2022 04:45 PM       Blood culture #2:No results found for: Nicole Grimms    Organism:  Lab Results   Component Value Date/Time    [facility] Yeast 12/27/2022 05:20 PM         Lab Results   Component Value Date/Time    LABGRAM  02/15/2022 11:00 AM     No segmented neutrophils observed. Few epithelial cells observed. Many gram positive cocci occurring singly and in pairs. Many gram negative bacilli. Few gram positive bacilli. MRSA culture only:No results found for: [facility]    Urine culture:   Lab Results   Component Value Date/Time    LABURIN No growth-preliminary 12/27/2022 05:20 PM    LABURIN [place] count: 50,000-90,000 CFU/mL 12/27/2022 05:20 PM       Respiratory culture: No results found for: CULTRESP    Aerobic and Anaerobic :  Lab Results   Component Value Date/Time    LABAERO No Acinetobacter species isolated 12/27/2022 08:30 PM     No results found for: LABANAE    Urinalysis:      Lab Results   Component Value Date/Time    NITRU NEGATIVE 12/27/2022 05:20 PM    WBCUA 0-2 12/27/2022 05:20 PM    BACTERIA FEW 12/27/2022 05:20 PM    RBCUA 0-2 12/27/2022 05:20 PM    BLOODU NEGATIVE 12/27/2022 05:20 PM    SPECGRAV >1.030 12/27/2022 05:20 PM    GLUCOSEU NEGATIVE 12/25/2018 08:00 PM       Radiology:  CTA CHEST W WO CONTRAST   Final Result       1. No pulmonary embolism   2. Bilateral lower lobe airspace infiltrates   3. Scattered tree-in-bud opacities in the right upper lobe consistent with infectious or inflammatory etiology. **This report has been created using voice recognition software. It may contain minor errors which are inherent in voice recognition technology. **      Final report electronically signed by Dr. Andressa Vizcaino on 12/27/2022 2:52 PM      XR CHEST (2 VW)   Final Result   1. Normal heart size. 2. Mild left basilar atelectasis/pneumonia. No effusion is seen. 3. Questionable additional patch of mild infiltrate left midlung laterally.             **This report has been created using voice recognition software. It may contain minor errors which are inherent in voice recognition technology. **      Final report electronically signed by Dr. Tan Raw on 12/27/2022 11:48 AM        XR CHEST (2 VW)    Result Date: 12/27/2022  PROCEDURE: XR CHEST (2 VW) CLINICAL INFORMATION: sob COMPARISON: 10/10/2018 TECHNIQUE: PA and lateral views of the chest were obtained. 1. Normal heart size. 2. Mild left basilar atelectasis/pneumonia. No effusion is seen. 3. Questionable additional patch of mild infiltrate left midlung laterally. **This report has been created using voice recognition software. It may contain minor errors which are inherent in voice recognition technology. ** Final report electronically signed by Dr. Tan Raw on 12/27/2022 11:48 AM    CTA CHEST W WO CONTRAST    Result Date: 12/27/2022  PROCEDURE: CTA CHEST W WO CONTRAST CLINICAL INFORMATION: rule out pe. Short of breath COMPARISON: 5/25/2011 TECHNIQUE: 3 mm axial images were obtained through the chest after the administration of IV contrast.  A non-contrast localizer was obtained. 3D reconstructions were performed on the scanner to include MIP coronal and sagittal images through the chest. Isovue was the intravenous contrast utilized. All CT scans at this facility use dose modulation, iterative reconstruction, and/or weight-based dosing when appropriate to reduce radiation dose to as low as reasonably achievable. FINDINGS: There is adequate opacification of the pulmonary arterial system. No pulmonary emboli are present. The aorta is within acceptable limits. The heart size is normal. There is no pericardial or pleural effusion. Multiple nonenlarged mediastinal lymph nodes similar to the prior exam. Calcified subcarinal nodes. Evaluation of the lungs is limited by motion and streak artifact. There are tree-in-bud opacities in the lateral right upper lobe. There are bilateral lower lobe airspace infiltrates.  There is no associated effusion. No suspicious osseous lesions are present. There are no suspicious findings in the imaged aspects of the upper abdomen. 1. No pulmonary embolism 2. Bilateral lower lobe airspace infiltrates 3. Scattered tree-in-bud opacities in the right upper lobe consistent with infectious or inflammatory etiology. **This report has been created using voice recognition software. It may contain minor errors which are inherent in voice recognition technology. ** Final report electronically signed by Dr. Dania Dozier on 12/27/2022 2:52 PM      Electronically signed by Sesar Norris DO on 12/30/2022 at 1:19 PM

## 2022-12-30 NOTE — PROGRESS NOTES
Pharmacy Intravenous to Oral Protocol    Medication changed to PO per P&T protocol: azithromycin    Patient meets criteria based on the followin. IV therapy > 24 hours - yes  2. No nausea/vomiting - no recent reports  3. Regular diet - yes  4. Tolerating other oral medications: yes  5. Afebrile for 24 hours: yes  6.  WBC trending downward: yes    Thank you,  Jemima Jay, PharmD, BCPS   2022  7:16 AM

## 2022-12-30 NOTE — PLAN OF CARE
Problem: Respiratory - Adult  Goal: Achieves optimal ventilation and oxygenation  12/29/2022 2138 by Susan Ng RN  Outcome: Progressing  Flowsheets (Taken 12/29/2022 2138)  Achieves optimal ventilation and oxygenation:   Assess for changes in mentation and behavior   Assess for changes in respiratory status   Position to facilitate oxygenation and minimize respiratory effort   Oxygen supplementation based on oxygen saturation or arterial blood gases     Problem: Respiratory - Adult  Goal: Clear lung sounds  12/29/2022 2138 by Susan Ng RN  Outcome: Progressing     Problem: Discharge Planning  Goal: Discharge to home or other facility with appropriate resources  12/29/2022 2138 by Susan Ng RN  Outcome: Progressing  Flowsheets (Taken 12/29/2022 2138)  Discharge to home or other facility with appropriate resources:   Identify barriers to discharge with patient and caregiver   Arrange for needed discharge resources and transportation as appropriate   Identify discharge learning needs (meds, wound care, etc)     Problem: Skin/Tissue Integrity  Goal: Absence of new skin breakdown  Description: 1. Monitor for areas of redness and/or skin breakdown  2. Assess vascular access sites hourly  3. Every 4-6 hours minimum:  Change oxygen saturation probe site  4. Every 4-6 hours:  If on nasal continuous positive airway pressure, respiratory therapy assess nares and determine need for appliance change or resting period. 12/29/2022 2138 by Susan Ng RN  Outcome: Progressing  Note: No new skin lesions noted this shift. Patient encouraged to reposition every two hours. Skin assessments completed and ongoing.         Problem: ABCDS Injury Assessment  Goal: Absence of physical injury  12/29/2022 2138 by Susan Ng RN  Outcome: Progressing  Flowsheets (Taken 12/29/2022 2138)  Absence of Physical Injury: Implement safety measures based on patient assessment     Problem: Safety - Adult  Goal: Free from fall injury  12/29/2022 2138 by Manjit Villegas RN  Outcome: Progressing  Flowsheets (Taken 12/29/2022 2138)  Free From Fall Injury: Instruct family/caregiver on patient safety     Problem: Pain  Goal: Verbalizes/displays adequate comfort level or baseline comfort level  12/29/2022 2138 by Manjit Villegas RN  Outcome: Progressing  Flowsheets (Taken 12/29/2022 2138)  Verbalizes/displays adequate comfort level or baseline comfort level:   Encourage patient to monitor pain and request assistance   Assess pain using appropriate pain scale   Administer analgesics based on type and severity of pain and evaluate response   Implement non-pharmacological measures as appropriate and evaluate response   Notify Licensed Independent Practitioner if interventions unsuccessful or patient reports new pain     Problem: Neurosensory - Adult  Goal: Achieves stable or improved neurological status  12/29/2022 2138 by Manjit Villegas RN  Outcome: Progressing  Flowsheets (Taken 12/29/2022 2138)  Achieves stable or improved neurological status: Assess for and report changes in neurological status     Problem: Infection - Adult  Goal: Absence of infection during hospitalization  12/29/2022 2138 by Manjit Villegas RN  Outcome: Progressing  [address] (Taken 12/29/2022 2138)  Absence of infection during hospitalization:   Assess and monitor for signs and symptoms of infection   Monitor lab/diagnostic results   Monitor all insertion sites i.e., indwelling lines, tubes and drains     Problem: Chronic Conditions and Co-morbidities  Goal: Patient's chronic conditions and co-morbidity symptoms are monitored and maintained or improved  12/29/2022 2138 by Manjit Villegas RN  Outcome: Progressing  Flowsheets (Taken 12/29/2022 2138)  Care Plan - Patient's Chronic Conditions and Co-Morbidity Symptoms are Monitored and Maintained or Improved:   Monitor and assess patient's chronic conditions and comorbid symptoms for stability, deterioration, or improvement   Collaborate with multidisciplinary team to address chronic and comorbid conditions and prevent exacerbation or deterioration   Update acute care plan with appropriate goals if chronic or comorbid symptoms are exacerbated and prevent overall improvement and discharge
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
## 2022-12-30 NOTE — PROGRESS NOTES
[facility] ICU STEPDOWN TELEMETRY 4K  Occupational Therapy  Daily Note  Time:    Time In: 06  Time Out: 8597  Timed Code Treatment Minutes: 24 Minutes  Minutes: 24          Date: 2022  Patient Name: Crystal Raman,   Gender: female      Room: [facility]/018-A  MRN: [id number]  : 1955  (79 y.o.)  Referring Practitioner: Marko Romo DO  Diagnosis: Hypoxia  Additional Pertinent Hx: Crystal Raman is a 79 y.o. female with PMHx of hypertension, multiple cirrhosis, hypothyroidism, GERD, HLD, schizoaffective disorder, tobacco use disorder and CKD 3 who presented to [facility] with complaint of shortness of breath but was not entirely clear about her symptoms of concern. Patient is visibly disheveled appearing unbathed and not well kept. Patient reports that she believes she may have the flu but denies vaccination. She reports using home oxygen at 3 L/min via nasal cannula and requiring additional oxygen support from her home pulse oximetry. She has associated symptoms of chills but was uncertain about fever. The remainder of the exam was inhibited by the patient's mental status as she was lethargic but easily rousable while intermittently falling asleep. Within the emergency department the patient was unable to maintain appropriate oxygen saturations and required escalating supplemental oxygen via nasal cannula which was subsequently escalated to high flow nasal cannula. She was administered 1 dose of ceftriaxone and doxycycline as well as 1 DuoNeb treatment. Restrictions/Precautions:  Restrictions/Precautions: General Precautions, Fall Risk  Position Activity Restriction  Other position/activity restrictions: HFNC       SUBJECTIVE: RN okayed session. Pt was resting in recliner upon arrival, agreeable to OT. Motivated to walk.      PAIN: denies    Vitals: Oxygen: on 40L @ 35%, decreasing to 90% with activity, requiring short seated rest break to recover to >94%    COGNITION: Decreased Insight, Decreased Problem Solving, Decreased Safety Awareness, and Impulsive    ADL:   Toileting: Minimal Assistance. For clothing mgmt  Toilet Transfer: [address] To/from [facility] d/t limited distance allowed by [facility] . BALANCE:  Sitting Balance:  Stand By Assistance. Standing Balance: Contact Guard Assistance. With 1-2 UE support     BED MOBILITY:  Not Tested     TRANSFERS:  Sit to Stand:  Contact Guard Assistance. From recliner, VC for hand placement  Stand to Sit: [address] To recliner, VC for hand placement     FUNCTIONAL MOBILITY:  Assistive Device: Straight Cane  Assist Level:  Contact Guard Assistance - Min A  Distance:  6' forward and retro x2 trials and to/from [facility]  Slightly unsteady requiring frequent VC for technique with straight cane    ASSESSMENT:     Activity Tolerance:  Patient tolerance of  treatment: fair. Limited by fatigue      Discharge Recommendations: Continue to assess pending progress and Inpatient Therapy Stay  Equipment Recommendations:  Other: monitor need for [facility], shower chair  Plan: Times Per Week: 5x  Current Treatment Recommendations: Strengthening, Balance training, Functional mobility training, Endurance training, Safety education & training, Patient/Caregiver education & training, Self-Care / ADL, Return to work related activity    Patient Education  Patient Education: Plan of Care, ADL's, Energy Conservation, Reviewed Prior Education, and Importance of Increasing Activity    Goals  Short Term Goals  Time Frame for Short Term Goals: by discharge  Short Term Goal 1: Pt will safely navigate to/from bathroom with LRAE and SBA for increased indep with ADLs  Short Term Goal 2: Pt will tolerate dynamic standing x5-6min with SBA and B hand release to increase indep with grooming  Short Term Goal 3: Pt will complete BADL routine with Min A and min VC for ECT to increase indep with self care  Short Term Goal 4: Pt will verbalize >/= 3 ECT for use during ADL routine to increase activity tolerance during self care routine    Following session, patient left in safe position with all fall risk precautions in place.

## 2022-12-30 NOTE — PLAN OF CARE
Problem: Respiratory - Adult  Goal: Achieves optimal ventilation and oxygenation  12/30/2022 1044 by Martín Bateman RN  Outcome: Progressing  Flowsheets (Taken 12/30/2022 0730)  Achieves optimal ventilation and oxygenation:   Assess for changes in respiratory status   Assess for changes in mentation and behavior   Position to facilitate oxygenation and minimize respiratory effort   Oxygen supplementation based on oxygen saturation or arterial blood gases   Initiate smoking cessation protocol as indicated   Encourage broncho-pulmonary hygiene including cough, deep breathe, incentive spirometry  Outcome: Progressing  Flowsheets (Taken 12/29/2022 2138)  Achieves optimal ventilation and oxygenation:   Assess for changes in mentation and behavior   Assess for changes in respiratory status   Position to facilitate oxygenation and minimize respiratory effort   Oxygen supplementation based on oxygen saturation or arterial blood gases  Outcome: Progressing  Flowsheets (Taken 12/29/2022 2137)  Achieves optimal ventilation and oxygenation:   Assess for changes in mentation and behavior   Assess for changes in respiratory status   Position to facilitate oxygenation and minimize respiratory effort   Oxygen supplementation based on oxygen saturation or arterial blood gases   Encourage broncho-pulmonary hygiene including cough, deep breathe, incentive spirometry  Goal: Clear lung sounds  12/30/2022 1044 by Martín Bateman RN  Outcome: Progressing  Outcome: Progressing  Outcome: Progressing  Outcome: Progressing     Problem: Discharge Planning  Goal: Discharge to home or other facility with appropriate resources  12/30/2022 1044 by Martín Bateman RN  Outcome: Progressing  Flowsheets (Taken 12/30/2022 0730)  Discharge to home or other facility with appropriate resources:   Identify barriers to discharge with patient and caregiver   Arrange for needed discharge resources and transportation as appropriate   Identify discharge learning needs (meds, wound care, etc)   Refer to discharge planning if patient needs post-hospital services based on physician order or complex needs related to functional status, cognitive ability or social support system  12/29/2022 2138 by Saray Parish RN  Outcome: Progressing  Flowsheets (Taken 12/29/2022 2138)  Discharge to home or other facility with appropriate resources:   Identify barriers to discharge with patient and caregiver   Arrange for needed discharge resources and transportation as appropriate   Identify discharge learning needs (meds, wound care, etc)  Outcome: Progressing     Problem: Skin/Tissue Integrity  Goal: Absence of new skin breakdown  Description: 1. Monitor for areas of redness and/or skin breakdown  2. Assess vascular access sites hourly  3. Every 4-6 hours minimum:  Change oxygen saturation probe site  4. Every 4-6 hours:  If on nasal continuous positive airway pressure, respiratory therapy assess nares and determine need for appliance change or resting period. 12/30/2022 1044 by Ab Leija RN  Outcome: Progressing  Note: Q2 turn  Outcome: Progressing  Note: No new skin lesions noted this shift. Patient encouraged to reposition every two hours. Skin assessments completed and ongoing.      Outcome: Progressing     Problem: ABCDS Injury Assessment  Goal: Absence of physical injury  12/30/2022 1044 by Ab Leija RN  Outcome: Progressing  Absence of Physical Injury: Implement safety measures based on patient assessment  Outcome: Progressing  Flowsheets (Taken 12/29/2022 2138)  Absence of Physical Injury: Implement safety measures based on patient assessment  Outcome: Progressing     Problem: Safety - Adult  Goal: Free from fall injury  12/30/2022 1044 by Ab Leija RN  Outcome: [id number] (Taken 12/29/2022 2138 by Saray Parish RN)  Free From Fall Injury: Instruct family/caregiver on patient safety  12/29/2022 2138 by Saray Parish RN  Outcome: Progressing  Flowsheets (Taken 12/29/2022 2138)  Free From Fall Injury: Instruct family/caregiver on patient safety  12/29/2022 2137 by Adam Soler RN  Outcome: Progressing     Problem: Pain  Goal: Verbalizes/displays adequate comfort level or baseline comfort level  12/30/2022 1044 by Aida Whitfield RN  Outcome: Progressing  Verbalizes/displays adequate comfort level or baseline comfort level:   Encourage patient to monitor pain and request assistance   Assess pain using appropriate pain scale   Administer analgesics based on type and severity of pain and evaluate response   Implement non-pharmacological measures as appropriate and evaluate response   Notify Licensed Independent Practitioner if interventions unsuccessful or patient reports new pain  Outcome: Progressing  Flowsheets (Taken 12/29/2022 2138)  Verbalizes/displays adequate comfort level or baseline comfort level:   Encourage patient to monitor pain and request assistance   Assess pain using appropriate pain scale   Administer analgesics based on type and severity of pain and evaluate response   Implement non-pharmacological measures as appropriate and evaluate response   Notify Licensed Independent Practitioner if interventions unsuccessful or patient reports new pain  Outcome: Progressing     Problem: Neurosensory - Adult  Goal: Achieves stable or improved neurological status  12/30/2022 1044 by Aida Whitfield RN  Outcome: Progressing  [address] (Taken 12/29/2022 2138 by Adam Soler RN)  Achieves stable or improved neurological status: Assess for and report changes in neurological status  12/29/2022 2138 by Adam Soler RN  Outcome: Progressing  Flowsheets (Taken 12/29/2022 2138)  Achieves stable or improved neurological status: Assess for and report changes in neurological status  Outcome: Progressing     Problem: Infection - Adult  Goal: Absence of infection during hospitalization  12/30/2022 1044 by Aida Whitfield RN  Outcome: Progressing  Flowsheets (Taken 12/30/2022 0730)  Absence of infection during hospitalization:   Assess and monitor for signs and symptoms of infection   Monitor lab/diagnostic results   Monitor all insertion sites i.e., indwelling lines, tubes and drains   Administer medications as ordered  12/29/2022 2138 by Stefanie Smith RN  Outcome: Progressing  Flowsheets (Taken 12/29/2022 2138)  Absence of infection during hospitalization:   Assess and monitor for signs and symptoms of infection   Monitor lab/diagnostic results   Monitor all insertion sites i.e., indwelling lines, tubes and drains  Outcome: Progressing     Problem: Chronic Conditions and Co-morbidities  Goal: Patient's chronic conditions and co-morbidity symptoms are monitored and maintained or improved  12/30/2022 1044 by Valeri Davenport RN  Outcome: Progressing  Flowsheets (Taken 12/30/2022 0730)  Care Plan - Patient's Chronic Conditions and Co-Morbidity Symptoms are Monitored and Maintained or Improved:   Collaborate with multidisciplinary team to address chronic and comorbid conditions and prevent exacerbation or deterioration   Monitor and assess patient's chronic conditions and comorbid symptoms for stability, deterioration, or improvement   Update acute care plan with appropriate goals if chronic or comorbid symptoms are exacerbated and prevent overall improvement and discharge  Outcome: Progressing  Flowsheets (Taken 12/29/2022 2138)  Care Plan - Patient's Chronic Conditions and Co-Morbidity Symptoms are Monitored and Maintained or Improved:   Monitor and assess patient's chronic conditions and comorbid symptoms for stability, deterioration, or improvement   Collaborate with multidisciplinary team to address chronic and comorbid conditions and prevent exacerbation or deterioration   Update acute care plan with appropriate goals if chronic or comorbid symptoms are exacerbated and prevent overall improvement and discharge  Outcome: Progressing Problem: Cardiovascular - Adult  Goal: Maintains optimal cardiac output and hemodynamic stability  Outcome: Progressing  Flowsheets (Taken 12/30/2022 1044)  Maintains optimal cardiac output and hemodynamic stability: Monitor blood pressure and heart rate     Problem: Skin/Tissue Integrity - Adult  Goal: Skin integrity remains intact  Outcome: Progressing  Flowsheets (Taken 12/30/2022 0730)  Skin Integrity Remains Intact:   Monitor for areas of redness and/or skin breakdown   Assess vascular access sites hourly     Problem: Musculoskeletal - Adult  Goal: Return mobility to safest level of function  Outcome: Progressing  Flowsheets (Taken 12/30/2022 1044)  Return Mobility to Safest Level of Function:   Assess patient stability and activity tolerance for standing, transferring and ambulating with or without assistive devices   Assist with transfers and ambulation using safe patient handling equipment as needed   Ensure adequate protection for wounds/incisions during mobilization     Problem: Gastrointestinal - Adult  Goal: Minimal or absence of nausea and vomiting  Outcome: Progressing     Problem: Genitourinary - Adult  Goal: Absence of urinary retention  Outcome: Progressing  Flowsheets (Taken 12/30/2022 1044)  Absence of urinary retention: Monitor intake/output and perform bladder scan as needed   Care plan reviewed with patient. Patient verbalizes understanding of the plan of care and contributes to goal setting.

## 2022-12-30 NOTE — PLAN OF CARE
Problem: Respiratory - Adult  Goal: Clear lung sounds  12/30/2022 0909 by Beverley Benz RCP  Outcome: Progressing       Pt. Wheezing in all lung lobes, continue the care plan as stated.

## 2022-12-30 NOTE — CARE COORDINATION
Collaborative Discharge Planning    Aj Faith  :  1955  MRN:  [id number]    ADMIT DATE:  2022      Discharge Planning Discharge Planning  Type of Residence: Apartment  Living Arrangements: Alone  Support Systems: Friends/Neighbors  Current Services Prior To Admission: Durable Medical Equipment  Current DME Prior to Arrival: [address] Medications: No  Type of Home Care Services: Nursing Services  Patient expects to be discharged to[de-identified] Apartment  Follow Up Appointment: Best Day/Time :  (either)  White Board Notes /Social Work Whiteboard Notes  /Social Work Whiteboard: ; SW - Return home with Current Passport/services. Lives alone. Refusing ECF. Has oxygen 3L ATC.     Discharge Plan  LTACH (precert day 1)    Discharge Milestones and Delays: Clinical status    COPD/PNA/Yeast UTI  History: Parkinson's, Bipolar, Schizohrenia, CVA, COPD, Smoker, Marijuana Use  HF Oxygen 35% FIO2/40L v Oxygen 6L  Febrile last 24h    Await LTACH precert (day 1)    SIGNED:  Fermin Beyer RN   2022, 9:23 AM

## 2022-12-31 PROCEDURE — 94640 AIRWAY INHALATION TREATMENT: CPT

## 2022-12-31 PROCEDURE — 6360000002 HC RX W HCPCS: Performed by: PHARMACIST

## 2022-12-31 PROCEDURE — 2060000000 HC ICU INTERMEDIATE R&B

## 2022-12-31 PROCEDURE — 6370000000 HC RX 637 (ALT 250 FOR IP): Performed by: STUDENT IN AN ORGANIZED HEALTH CARE EDUCATION/TRAINING PROGRAM

## 2022-12-31 PROCEDURE — 2700000000 HC OXYGEN THERAPY PER DAY

## 2022-12-31 PROCEDURE — 94761 N-INVAS EAR/PLS OXIMETRY MLT: CPT

## 2022-12-31 PROCEDURE — 94669 MECHANICAL CHEST WALL OSCILL: CPT

## 2022-12-31 PROCEDURE — 2580000003 HC RX 258: Performed by: STUDENT IN AN ORGANIZED HEALTH CARE EDUCATION/TRAINING PROGRAM

## 2022-12-31 PROCEDURE — 6360000002 HC RX W HCPCS: Performed by: STUDENT IN AN ORGANIZED HEALTH CARE EDUCATION/TRAINING PROGRAM

## 2022-12-31 RX ADMIN — CEFTRIAXONE SODIUM 1000 MG: 1 INJECTION, POWDER, FOR SOLUTION INTRAMUSCULAR; INTRAVENOUS at 15:13

## 2022-12-31 RX ADMIN — METOPROLOL TARTRATE 12.5 MG: 25 TABLET, FILM COATED ORAL at 10:09

## 2022-12-31 RX ADMIN — SODIUM CHLORIDE, PRESERVATIVE FREE 10 ML: 5 INJECTION INTRAVENOUS at 20:06

## 2022-12-31 RX ADMIN — PAROXETINE HYDROCHLORIDE 20 MG: 20 TABLET, FILM COATED ORAL at 10:09

## 2022-12-31 RX ADMIN — QUETIAPINE FUMARATE 600 MG: 400 TABLET ORAL at 20:06

## 2022-12-31 RX ADMIN — ENOXAPARIN SODIUM 30 MG: 100 INJECTION SUBCUTANEOUS at 10:09

## 2022-12-31 RX ADMIN — IPRATROPIUM BROMIDE AND ALBUTEROL SULFATE 1 AMPULE: .5; 3 SOLUTION RESPIRATORY (INHALATION) at 15:04

## 2022-12-31 RX ADMIN — AZITHROMYCIN MONOHYDRATE 250 MG: 250 TABLET ORAL at 10:09

## 2022-12-31 RX ADMIN — SODIUM CHLORIDE, PRESERVATIVE FREE 10 ML: 5 INJECTION INTRAVENOUS at 10:10

## 2022-12-31 RX ADMIN — IPRATROPIUM BROMIDE AND ALBUTEROL SULFATE 1 AMPULE: .5; 3 SOLUTION RESPIRATORY (INHALATION) at 08:51

## 2022-12-31 RX ADMIN — PREDNISONE 40 MG: 20 TABLET ORAL at 10:09

## 2022-12-31 RX ADMIN — METOPROLOL TARTRATE 12.5 MG: 25 TABLET, FILM COATED ORAL at 20:06

## 2022-12-31 RX ADMIN — ENOXAPARIN SODIUM 30 MG: 100 INJECTION SUBCUTANEOUS at 20:05

## 2022-12-31 ASSESSMENT — PAIN SCALES - GENERAL: PAINLEVEL_OUTOF10: 0

## 2022-12-31 NOTE — PLAN OF CARE
Problem: Respiratory - Adult  Goal: Achieves optimal ventilation and oxygenation  12/30/2022 1924 by Julio Tian RN  Outcome: Progressing  Flowsheets (Taken 12/30/2022 1924)  Achieves optimal ventilation and oxygenation:   Assess for changes in respiratory status   Assess for changes in mentation and behavior   Position to facilitate oxygenation and minimize respiratory effort     Problem: Respiratory - Adult  Goal: Clear lung sounds  12/30/2022 1924 by Julio Tian RN  Outcome: Progressing     Problem: Discharge Planning  Goal: Discharge to home or other facility with appropriate resources  12/30/2022 1924 by Julio Tian RN  Outcome: Progressing  Flowsheets (Taken 12/30/2022 1924)  Discharge to home or other facility with appropriate resources:   Identify barriers to discharge with patient and caregiver   Arrange for needed discharge resources and transportation as appropriate   Identify discharge learning needs (meds, wound care, etc)   Refer to discharge planning if patient needs post-hospital services based on physician order or complex needs related to functional status, cognitive ability or social support system     Problem: Skin/Tissue Integrity  Goal: Absence of new skin breakdown  Description: 1. Monitor for areas of redness and/or skin breakdown  2. Assess vascular access sites hourly  3. Every 4-6 hours minimum:  Change oxygen saturation probe site  4. Every 4-6 hours:  If on nasal continuous positive airway pressure, respiratory therapy assess nares and determine need for appliance change or resting period. 12/30/2022 1924 by Julio Tian RN  Outcome: Progressing  Note: No new skin lesions noted this shift. Patient encouraged to reposition every two hours. Skin assessments completed and ongoing.         Problem: ABCDS Injury Assessment  Goal: Absence of physical injury  12/30/2022 1924 by Julio Tian RN  Outcome: Progressing  Flowsheets (Taken 12/30/2022 1924)  Absence of Physical Injury: Implement safety measures based on patient assessment     Problem: Safety - Adult  Goal: Free from fall injury  12/30/2022 1924 by Carlos Head RN  Outcome: Progressing  Flowsheets (Taken 12/30/2022 1924)  Free From Fall Injury: Melecion 7 family/caregiver on patient safety     Problem: Pain  Goal: Verbalizes/displays adequate comfort level or baseline comfort level  12/30/2022 1924 by Carlos Head RN  Outcome: Progressing  Flowsheets (Taken 12/30/2022 1924)  Verbalizes/displays adequate comfort level or baseline comfort level:   Encourage patient to monitor pain and request assistance   Assess pain using appropriate pain scale   Administer analgesics based on type and severity of pain and evaluate response   Implement non-pharmacological measures as appropriate and evaluate response   Notify Licensed Independent Practitioner if interventions unsuccessful or patient reports new pain     Problem: Neurosensory - Adult  Goal: Achieves stable or improved neurological status  12/30/2022 1924 by Carlos Head RN  Outcome: Progressing  Flowsheets (Taken 12/30/2022 1924)  Achieves stable or improved neurological status: Assess for and report changes in neurological status     Problem: Infection - Adult  Goal: Absence of infection during hospitalization  12/30/2022 1924 by Carlos Head RN  Outcome: Progressing  Flowsheets (Taken 12/30/2022 1924)  Absence of infection during hospitalization:   Assess and monitor for signs and symptoms of infection   Monitor lab/diagnostic results   Monitor all insertion sites i.e., indwelling lines, tubes and drains     Problem: Chronic Conditions and Co-morbidities  Goal: Patient's chronic conditions and co-morbidity symptoms are monitored and maintained or improved  12/30/2022 1924 by Carlos Head RN  Outcome: Progressing  Flowsheets (Taken 12/30/2022 1924)  Care Plan - Patient's Chronic Conditions and Co-Morbidity Symptoms are Monitored and Maintained or Improved:   Monitor and assess patient's chronic conditions and comorbid symptoms for stability, deterioration, or improvement   Collaborate with multidisciplinary team to address chronic and comorbid conditions and prevent exacerbation or deterioration   Update acute care plan with appropriate goals if chronic or comorbid symptoms are exacerbated and prevent overall improvement and discharge     Problem: Cardiovascular - Adult  Goal: Maintains optimal cardiac output and hemodynamic stability  12/30/2022 1924 by Susan Ng RN  Outcome: Progressing  Flowsheets (Taken 12/30/2022 1924)  Maintains optimal cardiac output and hemodynamic stability: Monitor blood pressure and heart rate     Problem: Skin/Tissue Integrity - Adult  Goal: Skin integrity remains intact  12/30/2022 1924 by Susan Ng RN  Outcome: Progressing  Flowsheets (Taken 12/30/2022 1924)  Skin Integrity Remains Intact:   Monitor for areas of redness and/or skin breakdown   Assess vascular access sites hourly   Every 4-6 hours minimum: Change oxygen saturation probe site   Every 4-6 hours: If on nasal continuous positive airway pressure, respiratory therapy assesses nares and determine need for appliance change or resting period     Problem: Musculoskeletal - Adult  Goal: Return mobility to safest level of function  12/30/2022 1924 by Susan Ng RN  Outcome: Progressing  Flowsheets (Taken 12/30/2022 1924)  Return Mobility to Safest Level of Function:   Assess patient stability and activity tolerance for standing, transferring and ambulating with or without assistive devices   Assist with transfers and ambulation using safe patient handling equipment as needed   Ensure adequate protection for wounds/incisions during mobilization     Problem: Gastrointestinal - Adult  Goal: Minimal or absence of nausea and vomiting  12/30/2022 1924 by Susan Ng RN  Outcome: Progressing  Flowsheets (Taken 12/30/2022 1924)  Minimal or absence of nausea and vomiting:   Administer IV fluids as ordered to ensure adequate hydration   Administer ordered antiemetic medications as needed     Problem: Genitourinary - Adult  Goal: Absence of urinary retention  12/30/2022 1924 by Jewels Deutsch RN  Outcome: Progressing  Flowsheets (Taken 12/30/2022 1924)  Absence of urinary retention: Assess patients ability to void and empty bladder

## 2022-12-31 NOTE — FLOWSHEET NOTE
12/31/22 1233   Safe Environment   Safety Measures Other (comment)  (VN attempted admission)   VN called into patients room and introduced myself and role. Patient answered and permitted video. Video activated. . Patient up in chair. . Patient not oriented at this time. Unable to complete admission. VN placed call to daughter and no answer. VM left with daughter.

## 2022-12-31 NOTE — PROGRESS NOTES
Hospitalist Progress Note      Patient:  Arelis Steinberg    Unit/Bed:4K-18/018-A  YOB: 1955  MRN: [id number]   Acct: [de-identified]   PCP: BRIGETTE Ward CNP  Date of Admission: 12/27/2022    Assessment/Plan:    Sepsis, POA  CAP  Given new pulmonary infiltrates on CXR and CTA, will treat as CAP  Continue Rocephin/azithromycin. Will need 7-10 day course pending clinical response. Sputum cultures/PNA PCR uncollected. and blood cultures ngtd  AECOPD  Likely due to #2  Continue symbicort. Completed 5 day sof systemic glycocorticoids. Duonebs TID and prn  Acute on chronic hypoxic respiratory failure  Due to #2 and 3  Tolerating regular nasal cannula at 5-6L. Baseline O2 requirements of 3L. Will attempt to wean if able. Attempt Home O2 evaluation if her symptoms improve following AM breathing treatments. ABRAN on CKD IIIa, resolved. Hold NSAIDs  Avoid nephrotoxic agents  Multiple sclerosis  On Vumerity, previously on Tecfidera  HTN  Lopressor held for hypotension on arrival.  Resumed on 12/30  Hypothyroidism  On levothyroxine  GERD  PPI held due to ABRAN. Ok to resume. HLD  On statin  Schizoaffective d/o with bipolar and anxiety  Ok to resume seroquel and paxil today. Holding trazodone and atarax. Will need to verify Ability rx. Disposition: Likely discharge home in 24-48 hours. Planning home with [facility]. Declined SNF. May need to consider LTAC if prolonged weaning from [facility] anticipated. Chief Complaint: SOB    Hospital Course:    Per H&P - Arelis Steinberg is a 79 y.o. female with PMHx of hypertension, multiple cirrhosis, hypothyroidism, GERD, HLD, schizoaffective disorder, tobacco use disorder and CKD 3 [email or web address] [place] [address] with complaint of shortness of breath but was not entirely clear about her symptoms of concern. Patient is visibly disheveled appearing unbathed and not well kept.  Patient reports that she believes she may have the flu but denies vaccination. She reports using home oxygen at 3 L/min via nasal cannula and requiring additional oxygen support from her home pulse oximetry. She has associated symptoms of chills but was uncertain about fever. The remainder of the exam was inhibited by the patient's mental status as she was lethargic but easily rousable while intermittently falling asleep. Within the emergency department the patient was unable to maintain appropriate oxygen saturations and required escalating supplemental oxygen via nasal cannula which was subsequently escalated to high flow nasal cannula. She was administered 1 dose of ceftriaxone and doxycycline as well as 1 DuoNeb treatment. 12/28: weaned to 6L NC but with desaturations overnight and placed back on HHFNC. Subjective (past 24 hours): Increased SOB this morning but reports that is fairly typical for her until she gets her morning inhalers. Denies fevers or chills. Denies chest pain. Past medical history, family history, social history and allergies reviewed again and is unchanged since admission. ROS (12 point review of systems completed. Pertinent positives noted.  Otherwise ROS is negative)     Medications:  Reviewed    Infusion Medications    sodium chloride       Scheduled Medications    azithromycin  250 mg Oral Q24H    QUEtiapine  600 mg Oral QHS    PARoxetine  20 mg Oral QAM    metoprolol tartrate  12.5 mg Oral BID    ipratropium-albuterol  1 ampule Inhalation Q4H WA    sodium chloride flush  5-40 mL IntraVENous 2 times per day    enoxaparin  30 mg SubCUTAneous Q12H    cefTRIAXone (ROCEPHIN) IV  1,000 mg IntraVENous Q24H     PRN Meds: sodium chloride flush, sodium chloride, ondansetron **OR** ondansetron, polyethylene glycol, acetaminophen **OR** acetaminophen, ipratropium-albuterol      Intake/Output Summary (Last 24 hours) at 12/31/2022 1400  Last data filed at 12/31/2022 1056  Gross per 24 hour   Intake 240 ml   Output 250 ml   Net -10 ml         Diet:  ADULT DIET; Regular; Low Fat/Low Chol/High Fiber/KENNY    Exam:  BP (!) 109/49   Pulse 68   Temp 99 °F (37.2 °C) (Oral)   Resp 20   Ht 5' 6\" (1.676 m)   Wt 221 lb 11.2 oz (100.6 kg)   SpO2 93%   BMI 35.78 kg/m²   General appearance: No apparent distress, appears stated age and cooperative. HEENT: Pupils equal, round, and reactive to light. Conjunctivae/corneas clear. Neck: Supple, with full range of motion. No jugular venous distention. Trachea midline. Respiratory:  Diminished breath sounds throughout all lung fields. Trace wheezes. No rhonchi. Mild to moderate scattered wheezes. Cardiovascular: Regular rate and rhythm with normal S1/S2 without murmurs, rubs or gallops. Abdomen: Soft, non-tender, non-distended with normal bowel sounds. Musculoskeletal: passive and active ROM x 4 extremities. Skin: Skin color, texture, turgor normal.  No rashes or lesions. Neurologic:  Neurovascularly intact without any focal sensory/motor deficits. Cranial nerves: II-XII intact, grossly non-focal.  Psychiatric: Alert and oriented, thought content appropriate  Capillary Refill: Brisk,< 3 seconds   Peripheral Pulses: +2 palpable, equal bilaterally     Labs:   Recent Labs     12/29/22 0427   WBC 14.1*   HGB 12.9   HCT 40.8          Recent Labs     12/29/22 0427      K 4.2      CO2 26   BUN 23*   CREATININE 0.7   CALCIUM 8.3*       No results for input(s): AST, ALT, BILIDIR, BILITOT, ALKPHOS in the last 72 hours. No results for input(s): INR in the last 72 hours. No results for input(s): Kathyrn Belch in the last 72 hours. Microbiology:    Blood culture #1:   Lab Results   Component Value Date/Time    BC No growth 24 hours. No growth 48 hours.  12/27/2022 04:45 PM       Blood culture #2:No results found for: BLOODCULT2    Organism:  Lab Results   Component Value Date/Time    [facility] Yeast 12/27/2022 05:20 PM         Lab Results Component Value Date/Time    LABGRAM  02/15/2022 11:00 AM     No segmented neutrophils observed. Few epithelial cells observed. Many gram positive cocci occurring singly and in pairs. Many gram negative bacilli. Few gram positive bacilli. MRSA culture only:No results found for: [facility]    Urine culture:   Lab Results   Component Value Date/Time    LABURIN No growth-preliminary 12/27/2022 05:20 PM    LABURIN [place] count: 50,000-90,000 CFU/mL 12/27/2022 05:20 PM       Respiratory culture: No results found for: CULTRESP    Aerobic and Anaerobic :  Lab Results   Component Value Date/Time    LABAERO No Acinetobacter species isolated 12/27/2022 08:30 PM     No results found for: LABANAE    Urinalysis:      Lab Results   Component Value Date/Time    NITRU NEGATIVE 12/27/2022 05:20 PM    WBCUA 0-2 12/27/2022 05:20 PM    BACTERIA FEW 12/27/2022 05:20 PM    RBCUA 0-2 12/27/2022 05:20 PM    BLOODU NEGATIVE 12/27/2022 05:20 PM    SPECGRAV >1.030 12/27/2022 05:20 PM    GLUCOSEU NEGATIVE 12/25/2018 08:00 PM       Radiology:  CTA CHEST W WO CONTRAST   Final Result       1. No pulmonary embolism   2. Bilateral lower lobe airspace infiltrates   3. Scattered tree-in-bud opacities in the right upper lobe consistent with infectious or inflammatory etiology. **This report has been created using voice recognition software. It may contain minor errors which are inherent in voice recognition technology. **      Final report electronically signed by Dr. Romario Cota on 12/27/2022 2:52 PM      XR CHEST (2 VW)   Final Result   1. Normal heart size. 2. Mild left basilar atelectasis/pneumonia. No effusion is seen. 3. Questionable additional patch of mild infiltrate left midlung laterally. **This report has been created using voice recognition software. It may contain minor errors which are inherent in voice recognition technology. **      Final report electronically signed by Dr. Ariella Collins on 12/27/2022 11:48 AM        XR CHEST (2 VW)    Result Date: 12/27/2022  PROCEDURE: XR CHEST (2 VW) CLINICAL INFORMATION: sob COMPARISON: 10/10/2018 TECHNIQUE: PA and lateral views of the chest were obtained. 1. Normal heart size. 2. Mild left basilar atelectasis/pneumonia. No effusion is seen. 3. Questionable additional patch of mild infiltrate left midlung laterally. **This report has been created using voice recognition software. It may contain minor errors which are inherent in voice recognition technology. ** Final report electronically signed by Dr. Hubert Fisher on 12/27/2022 11:48 AM    CTA CHEST W WO CONTRAST    Result Date: 12/27/2022  PROCEDURE: CTA CHEST W WO CONTRAST CLINICAL INFORMATION: rule out pe. Short of breath COMPARISON: 5/25/2011 TECHNIQUE: 3 mm axial images were obtained through the chest after the administration of IV contrast.  A non-contrast localizer was obtained. 3D reconstructions were performed on the scanner to include MIP coronal and sagittal images through the chest. Isovue was the intravenous contrast utilized. All CT scans at this facility use dose modulation, iterative reconstruction, and/or weight-based dosing when appropriate to reduce radiation dose to as low as reasonably achievable. FINDINGS: There is adequate opacification of the pulmonary arterial system. No pulmonary emboli are present. The aorta is within acceptable limits. The heart size is normal. There is no pericardial or pleural effusion. Multiple nonenlarged mediastinal lymph nodes similar to the prior exam. Calcified subcarinal nodes. Evaluation of the lungs is limited by motion and streak artifact. There are tree-in-bud opacities in the lateral right upper lobe. There are bilateral lower lobe airspace infiltrates. There is no associated effusion. No suspicious osseous lesions are present. There are no suspicious findings in the imaged aspects of the upper abdomen. 1. No pulmonary embolism 2.  Bilateral lower lobe airspace infiltrates 3. Scattered tree-in-bud opacities in the right upper lobe consistent with infectious or inflammatory etiology. **This report has been created using voice recognition software. It may contain minor errors which are inherent in voice recognition technology. ** Final report electronically signed by Dr. Luisa Freed on 12/27/2022 2:52 PM      Electronically signed by Susie Campos DO on 12/31/2022 at 2:00 PM

## 2022-12-31 NOTE — RT PROTOCOL NOTE
RT Inhaler-Nebulizer Bronchodilator Protocol Note    There is a bronchodilator order in the chart from a provider indicating to follow the RT Bronchodilator Protocol and there is an Initiate RT Inhaler-Nebulizer Bronchodilator Protocol order as well (see protocol at bottom of note). CXR Findings:  No results found. The findings from the last RT Protocol Assessment were as follows:   History Pulmonary Disease: Chronic pulmonary disease  Respiratory Pattern: Mild dyspnea at rest, irregular pattern, or RR 21-25 bpm  Breath Sounds: Inspiratory and expiratory or bilateral wheezing and/or rhonchi  Cough: Strong, spontaneous, non-productive  Indication for Bronchodilator Therapy: Wheezing associated with pulm disorder  Bronchodilator Assessment Score: 12    Aerosolized bronchodilator medication orders have been revised according to the RT Inhaler-Nebulizer Bronchodilator Protocol below. Respiratory Therapist to perform RT Therapy Protocol Assessment initially then follow the protocol. Repeat RT Therapy Protocol Assessment PRN for score 0-3 or on second treatment, BID, and PRN for scores above 3. No Indications - adjust the frequency to every 6 hours PRN wheezing or bronchospasm, if no treatments needed after 48 hours then discontinue using Per Protocol order mode. If indication present, adjust the RT bronchodilator orders based on the Bronchodilator Assessment Score as indicated below. Use Inhaler orders unless patient has one or more of the following: on home nebulizer, not able to hold breath for 10 seconds, is not alert and oriented, cannot activate and use MDI correctly, or respiratory rate 25 breaths per minute or more, then use the equivalent nebulizer order(s) with same Frequency and PRN reasons based on the score. If a patient is on this medication at home then do not decrease Frequency below that used at home.     0-3 - enter or revise RT bronchodilator order(s) to equivalent RT Bronchodilator order with Frequency of every 4 hours PRN for wheezing or increased work of breathing using Per Protocol order mode. 4-6 - enter or revise RT Bronchodilator order(s) to two equivalent RT bronchodilator orders with one order with BID Frequency and one order with Frequency of every 4 hours PRN wheezing or increased work of breathing using Per Protocol order mode. 7-10 - enter or revise RT Bronchodilator order(s) to two equivalent RT bronchodilator orders with one order with TID Frequency and one order with Frequency of every 4 hours PRN wheezing or increased work of breathing using Per Protocol order mode. 11-13 - enter or revise RT Bronchodilator order(s) to one equivalent RT bronchodilator order with QID Frequency and an Albuterol order with Frequency of every 4 hours PRN wheezing or increased work of breathing using Per Protocol order mode. Greater than 13 - enter or revise RT Bronchodilator order(s) to one equivalent RT bronchodilator order with every 4 hours Frequency and an Albuterol order with Frequency of every 2 hours PRN wheezing or increased work of breathing using Per Protocol order mode. RT to enter RT Home Evaluation for COPD & MDI Assessment order using Per Protocol order mode.     Electronically signed by Jimi Orlando RCP on 12/30/2022 at 8:36 PM

## 2022-12-31 NOTE — PLAN OF CARE
Problem: Respiratory - Adult  Goal: Achieves optimal ventilation and oxygenation  12/30/2022 2034 by Ave Mcnally RCP  Outcome: Progressing     Problem: Respiratory - Adult  Goal: Clear lung sounds  12/30/2022 2034 by Ave Mcnally RCP  Outcome: Progressing    Continue treatments as ordered per protocol to help facilitate normal breath sounds. Continue to wean O2 per protocol as tolerated to keep Spo2 above 92% . Pt currently resting on 6 LPM NC.  Pt agreeable to plan

## 2023-01-01 LAB
AEROBIC CULTURE: NORMAL
ANION GAP SERPL CALCULATED.3IONS-SCNC: 9 MEQ/L (ref 8–16)
BASOPHILS # BLD: 0.4 %
BASOPHILS ABSOLUTE: 0 THOU/MM3 (ref 0–0.1)
BLOOD CULTURE, ROUTINE: NORMAL
BLOOD CULTURE, ROUTINE: NORMAL
BUN BLDV-MCNC: 18 MG/DL (ref 7–22)
CALCIUM SERPL-MCNC: 8.6 MG/DL (ref 8.5–10.5)
CHLORIDE BLD-SCNC: 99 MEQ/L (ref 98–111)
CO2: 31 MEQ/L (ref 23–33)
CREAT SERPL-MCNC: 0.7 MG/DL (ref 0.4–1.2)
EOSINOPHIL # BLD: 0.9 %
EOSINOPHILS ABSOLUTE: 0.1 THOU/MM3 (ref 0–0.4)
ERYTHROCYTE [DISTWIDTH] IN BLOOD BY AUTOMATED COUNT: 14.7 % (ref 11.5–14.5)
ERYTHROCYTE [DISTWIDTH] IN BLOOD BY AUTOMATED COUNT: 45.6 FL (ref 35–45)
GFR SERPL CREATININE-BSD FRML MDRD: > 60 ML/MIN/1.73M2
GLUCOSE BLD-MCNC: 88 MG/DL (ref 70–108)
HCT VFR BLD CALC: 41.9 % (ref 37–47)
HEMOGLOBIN: 13.2 GM/DL (ref 12–16)
IMMATURE GRANS (ABS): 0.19 THOU/MM3 (ref 0–0.07)
IMMATURE GRANULOCYTES: 2.7 %
LYMPHOCYTES # BLD: 8 %
LYMPHOCYTES ABSOLUTE: 0.6 THOU/MM3 (ref 1–4.8)
MCH RBC QN AUTO: 26.8 PG (ref 26–33)
MCHC RBC AUTO-ENTMCNC: 31.5 GM/DL (ref 32.2–35.5)
MCV RBC AUTO: 85.2 FL (ref 81–99)
MONOCYTES # BLD: 10.6 %
MONOCYTES ABSOLUTE: 0.7 THOU/MM3 (ref 0.4–1.3)
NUCLEATED RED BLOOD CELLS: 0 /100 WBC
ORGANISM: ABNORMAL
PLATELET # BLD: 270 THOU/MM3 (ref 130–400)
PMV BLD AUTO: 8.9 FL (ref 9.4–12.4)
POTASSIUM SERPL-SCNC: 4.1 MEQ/L (ref 3.5–5.2)
RBC # BLD: 4.92 MILL/MM3 (ref 4.2–5.4)
SEG NEUTROPHILS: 77.4 %
SEGMENTED NEUTROPHILS ABSOLUTE COUNT: 5.4 THOU/MM3 (ref 1.8–7.7)
SODIUM BLD-SCNC: 139 MEQ/L (ref 135–145)
URINE CULTURE, ROUTINE: ABNORMAL
URINE CULTURE, ROUTINE: ABNORMAL
WBC # BLD: 7 THOU/MM3 (ref 4.8–10.8)

## 2023-01-01 PROCEDURE — 94669 MECHANICAL CHEST WALL OSCILL: CPT

## 2023-01-01 PROCEDURE — 6370000000 HC RX 637 (ALT 250 FOR IP): Performed by: STUDENT IN AN ORGANIZED HEALTH CARE EDUCATION/TRAINING PROGRAM

## 2023-01-01 PROCEDURE — 2580000003 HC RX 258: Performed by: STUDENT IN AN ORGANIZED HEALTH CARE EDUCATION/TRAINING PROGRAM

## 2023-01-01 PROCEDURE — 94640 AIRWAY INHALATION TREATMENT: CPT

## 2023-01-01 PROCEDURE — 2060000000 HC ICU INTERMEDIATE R&B

## 2023-01-01 PROCEDURE — 36415 COLL VENOUS BLD VENIPUNCTURE: CPT

## 2023-01-01 PROCEDURE — 85025 COMPLETE CBC W/AUTO DIFF WBC: CPT

## 2023-01-01 PROCEDURE — 80048 BASIC METABOLIC PNL TOTAL CA: CPT

## 2023-01-01 PROCEDURE — 6360000002 HC RX W HCPCS: Performed by: PHARMACIST

## 2023-01-01 PROCEDURE — 94760 N-INVAS EAR/PLS OXIMETRY 1: CPT

## 2023-01-01 RX ORDER — PREDNISONE 20 MG/1
20 TABLET ORAL DAILY
Status: DISCONTINUED | OUTPATIENT
Start: 2023-01-11 | End: 2023-01-02

## 2023-01-01 RX ORDER — PREDNISONE 20 MG/1
40 TABLET ORAL ONCE
Status: COMPLETED | OUTPATIENT
Start: 2023-01-01 | End: 2023-01-01

## 2023-01-01 RX ORDER — PREDNISONE 1 MG/1
5 TABLET ORAL DAILY
Status: DISCONTINUED | OUTPATIENT
Start: 2023-01-21 | End: 2023-01-02

## 2023-01-01 RX ORDER — PREDNISONE 10 MG/1
10 TABLET ORAL DAILY
Status: DISCONTINUED | OUTPATIENT
Start: 2023-01-16 | End: 2023-01-02

## 2023-01-01 RX ORDER — PREDNISONE 20 MG/1
40 TABLET ORAL DAILY
Status: DISCONTINUED | OUTPATIENT
Start: 2023-01-02 | End: 2023-01-02

## 2023-01-01 RX ADMIN — AZITHROMYCIN MONOHYDRATE 250 MG: 250 TABLET ORAL at 10:51

## 2023-01-01 RX ADMIN — SODIUM CHLORIDE, PRESERVATIVE FREE 10 ML: 5 INJECTION INTRAVENOUS at 20:18

## 2023-01-01 RX ADMIN — SODIUM CHLORIDE, PRESERVATIVE FREE 10 ML: 5 INJECTION INTRAVENOUS at 08:58

## 2023-01-01 RX ADMIN — METOPROLOL TARTRATE 12.5 MG: 25 TABLET, FILM COATED ORAL at 08:57

## 2023-01-01 RX ADMIN — IPRATROPIUM BROMIDE AND ALBUTEROL SULFATE 1 AMPULE: .5; 3 SOLUTION RESPIRATORY (INHALATION) at 20:46

## 2023-01-01 RX ADMIN — METOPROLOL TARTRATE 12.5 MG: 25 TABLET, FILM COATED ORAL at 20:18

## 2023-01-01 RX ADMIN — IPRATROPIUM BROMIDE AND ALBUTEROL SULFATE 1 AMPULE: .5; 3 SOLUTION RESPIRATORY (INHALATION) at 08:15

## 2023-01-01 RX ADMIN — PREDNISONE 40 MG: 20 TABLET ORAL at 10:51

## 2023-01-01 RX ADMIN — PAROXETINE HYDROCHLORIDE 20 MG: 20 TABLET, FILM COATED ORAL at 08:57

## 2023-01-01 RX ADMIN — IPRATROPIUM BROMIDE AND ALBUTEROL SULFATE 1 AMPULE: .5; 3 SOLUTION RESPIRATORY (INHALATION) at 23:52

## 2023-01-01 RX ADMIN — IPRATROPIUM BROMIDE AND ALBUTEROL SULFATE 1 AMPULE: .5; 3 SOLUTION RESPIRATORY (INHALATION) at 16:30

## 2023-01-01 RX ADMIN — ENOXAPARIN SODIUM 30 MG: 100 INJECTION SUBCUTANEOUS at 08:57

## 2023-01-01 RX ADMIN — QUETIAPINE FUMARATE 600 MG: 400 TABLET ORAL at 20:18

## 2023-01-01 RX ADMIN — IPRATROPIUM BROMIDE AND ALBUTEROL SULFATE 1 AMPULE: .5; 3 SOLUTION RESPIRATORY (INHALATION) at 12:27

## 2023-01-01 RX ADMIN — ENOXAPARIN SODIUM 30 MG: 100 INJECTION SUBCUTANEOUS at 20:18

## 2023-01-01 ASSESSMENT — PAIN SCALES - GENERAL
PAINLEVEL_OUTOF10: 0
PAINLEVEL_OUTOF10: 0

## 2023-01-01 NOTE — FLOWSHEET NOTE
01/01/23 1616   Safe Environment   Safety Measures Other (comment)  (VN made call to alternate contact number to complete admission documents. No answer.  sent message to [facility].)

## 2023-01-01 NOTE — RT PROTOCOL NOTE
RT Inhaler-Nebulizer Bronchodilator Protocol Note    There is a bronchodilator order in the chart from a provider indicating to follow the RT Bronchodilator Protocol and there is an Initiate RT Inhaler-Nebulizer Bronchodilator Protocol order as well (see protocol at bottom of note). CXR Findings:  No results found. The findings from the last RT Protocol Assessment were as follows:   History Pulmonary Disease: Chronic pulmonary disease  Respiratory Pattern: Mild dyspnea at rest, irregular pattern, or RR 21-25 bpm  Breath Sounds: Inspiratory and expiratory or bilateral wheezing and/or rhonchi  Cough: Strong, spontaneous, non-productive  Indication for Bronchodilator Therapy: Wheezing associated with pulm disorder  Bronchodilator Assessment Score: 12    Aerosolized bronchodilator medication orders have been revised according to the RT Inhaler-Nebulizer Bronchodilator Protocol below. Respiratory Therapist to perform RT Therapy Protocol Assessment initially then follow the protocol. Repeat RT Therapy Protocol Assessment PRN for score 0-3 or on second treatment, BID, and PRN for scores above 3. No Indications - adjust the frequency to every 6 hours PRN wheezing or bronchospasm, if no treatments needed after 48 hours then discontinue using Per Protocol order mode. If indication present, adjust the RT bronchodilator orders based on the Bronchodilator Assessment Score as indicated below. Use Inhaler orders unless patient has one or more of the following: on home nebulizer, not able to hold breath for 10 seconds, is not alert and oriented, cannot activate and use MDI correctly, or respiratory rate 25 breaths per minute or more, then use the equivalent nebulizer order(s) with same Frequency and PRN reasons based on the score. If a patient is on this medication at home then do not decrease Frequency below that used at home.     0-3 - enter or revise RT bronchodilator order(s) to equivalent RT Bronchodilator order with Frequency of every 4 hours PRN for wheezing or increased work of breathing using Per Protocol order mode. 4-6 - enter or revise RT Bronchodilator order(s) to two equivalent RT bronchodilator orders with one order with BID Frequency and one order with Frequency of every 4 hours PRN wheezing or increased work of breathing using Per Protocol order mode. 7-10 - enter or revise RT Bronchodilator order(s) to two equivalent RT bronchodilator orders with one order with TID Frequency and one order with Frequency of every 4 hours PRN wheezing or increased work of breathing using Per Protocol order mode. 11-13 - enter or revise RT Bronchodilator order(s) to one equivalent RT bronchodilator order with QID Frequency and an Albuterol order with Frequency of every 4 hours PRN wheezing or increased work of breathing using Per Protocol order mode. Greater than 13 - enter or revise RT Bronchodilator order(s) to one equivalent RT bronchodilator order with every 4 hours Frequency and an Albuterol order with Frequency of every 2 hours PRN wheezing or increased work of breathing using Per Protocol order mode. RT to enter RT Home Evaluation for COPD & MDI Assessment order using Per Protocol order mode.     Electronically signed by Vashti Hough RCP on 1/1/2023 at 8:29 AM

## 2023-01-01 NOTE — PROGRESS NOTES
Hospitalist Progress Note      Patient:  Zainab Masterson    Unit/Bed:4K-18/018-A  YOB: 1955  MRN: [id number]   Acct: [de-identified]   PCP: BRIGETTE Soares CNP  Date of Admission: 12/27/2022    Assessment/Plan:    Sepsis, POA  CAP  Given new pulmonary infiltrates on CXR and CTA, will treat as CAP  Continue Rocephin/azithromycin. Will need 7-10 day course pending clinical response. Sputum cultures/PNA PCR uncollected. and blood cultures ngtd  AECOPD  Likely due to #2  Continue symbicort. Completed 5 day sof systemic glycocorticoids, however noted worsening hypoxia this morning with increased bronchospasm. Will restart on steroids and will plan for prolonged taper given severe underlying COPD/chronic hypoxia  Duonebs TID and prn  Acute on chronic hypoxic respiratory failure  Due to #2 and 3  Tolerating regular nasal cannula at 5-6L. Baseline O2 requirements of 3L. Successfully weaned to 3L most of yesterday, however noted to have desaturations to the low 80's this morning. Maintaining 89-91% at rest on 6L currently. Will restart steroids as above and plan for taper. ABRAN on CKD IIIa, resolved. Hold NSAIDs  Avoid nephrotoxic agents  Multiple sclerosis  On Vumerity, previously on Tecfidera  HTN  Lopressor held for hypotension on arrival.  Resumed on 12/30  Hypothyroidism  On levothyroxine  GERD  PPI held due to ABRAN. Ok to resume. HLD  On statin  Schizoaffective d/o with bipolar and anxiety  Ok to resume seroquel and paxil today. Holding trazodone and atarax. Will need to verify Ability rx. Disposition: Likely discharge home in 24-48 hours. Planning home with [facility]. Declined SNF. May need to consider LTAC if prolonged weaning from [facility] anticipated.      Chief Complaint: SOB    Hospital Course:    Per H&P - Zainab Masterson is a 79 y.o. female with PMHx of hypertension, multiple cirrhosis, hypothyroidism, GERD, HLD, schizoaffective disorder, tobacco use disorder and CKD 3 who presented to [address] [place] [address] with complaint of shortness of breath but was not entirely clear about her symptoms of concern. Patient is visibly disheveled appearing unbathed and not well kept. Patient reports that she believes she may have the flu but denies vaccination. She reports using home oxygen at 3 L/min via nasal cannula and requiring additional oxygen support from her home pulse oximetry. She has associated symptoms of chills but was uncertain about fever. The remainder of the exam was inhibited by the patient's mental status as she was lethargic but easily rousable while intermittently falling asleep. Within the emergency department the patient was unable to maintain appropriate oxygen saturations and required escalating supplemental oxygen via nasal cannula which was subsequently escalated to high flow nasal cannula. She was administered 1 dose of ceftriaxone and doxycycline as well as 1 DuoNeb treatment. 12/28: weaned to 6L NC but with desaturations overnight and placed back on HHFNC.      12/29: still requiring HHFNC    12/30 tolerating 6L NC throughout the day. No longer requiring [address]    12/31: weaned to 3L NC throughout much of the day. Subjective (past 24 hours): Increased wheezing noted this morning with desaturation on baseline 3L O2 to the low to mid 80's. O2 increased to 6L NC to maintain 89-91%. Patient states she feels increased wheezes as well but is still interested in possibly going home today. Past medical history, family history, social history and allergies reviewed again and is unchanged since admission. ROS (12 point review of systems completed. Pertinent positives noted.  Otherwise ROS is negative)     Medications:  Reviewed    Infusion Medications    sodium chloride       Scheduled Medications    predniSONE  40 mg Oral Once    [START ON 1/2/2023] predniSONE  40 mg Oral Daily    Followed by [START ON 1/6/2023] predniSONE  30 mg Oral Daily    Followed by    Vinny Yadav ON 1/11/2023] predniSONE  20 mg Oral Daily    Followed by    Vinny Yadav ON 1/16/2023] predniSONE  10 mg Oral Daily    Followed by    Vinny Yadav ON 1/21/2023] predniSONE  5 mg Oral Daily    azithromycin  250 mg Oral Q24H    QUEtiapine  600 mg Oral QHS    PARoxetine  20 mg Oral QAM    metoprolol tartrate  12.5 mg Oral BID    ipratropium-albuterol  1 ampule Inhalation Q4H WA    sodium chloride flush  5-40 mL IntraVENous 2 times per day    enoxaparin  30 mg SubCUTAneous Q12H     PRN Meds: sodium chloride flush, sodium chloride, ondansetron **OR** ondansetron, polyethylene glycol, acetaminophen **OR** acetaminophen, ipratropium-albuterol      Intake/Output Summary (Last 24 hours) at 1/1/2023 6642  Last data filed at 1/1/2023 6639  Gross per 24 hour   Intake 640 ml   Output 250 ml   Net 390 ml         Diet:  ADULT DIET; Regular; Low Fat/Low Chol/High Fiber/KENNY    Exam:  /62   Pulse 66   Temp 97.3 °F (36.3 °C) (Oral)   Resp 20   Ht 5' 6\" (1.676 m)   Wt 220 lb 8 oz (100 kg)   SpO2 90%   BMI 35.59 kg/m²   General appearance: No apparent distress, appears stated age and cooperative. HEENT: Pupils equal, round, and reactive to light. Conjunctivae/corneas clear. Neck: Supple, with full range of motion. No jugular venous distention. Trachea midline. Respiratory:  Diminished breath sounds throughout all lung fields. Trace wheezes. No rhonchi. Moderate scattered wheezes. Cardiovascular: Regular rate and rhythm with normal S1/S2 without murmurs, rubs or gallops. Abdomen: Soft, non-tender, non-distended with normal bowel sounds. Musculoskeletal: passive and active ROM x 4 extremities. Skin: Skin color, texture, turgor normal.  No rashes or lesions. Neurologic:  Neurovascularly intact without any focal sensory/motor deficits.  Cranial nerves: II-XII intact, grossly non-focal.  Psychiatric: Alert and oriented, thought content appropriate  Capillary Refill: Brisk,< 3 seconds   Peripheral Pulses: +2 palpable, equal bilaterally     Labs:   Recent Labs     01/01/23  0835   WBC 7.0   HGB 13.2   HCT 41.9          Recent Labs     01/01/23  0835      K 4.1   CL 99   CO2 31   BUN 18   CREATININE 0.7   CALCIUM 8.6       No results for input(s): AST, ALT, BILIDIR, BILITOT, ALKPHOS in the last 72 hours. No results for input(s): INR in the last 72 hours. No results for input(s): Zenia Parisian in the last 72 hours. Microbiology:    Blood culture #1:   Lab Results   Component Value Date/Time    BC No growth 24 hours. No growth 48 hours. 12/27/2022 04:45 PM       Blood culture #2:No results found for: Sahil Parra    Organism:  Lab Results   Component Value Date/Time    ORG Jo glabrata 12/27/2022 05:20 PM         Lab Results   Component Value Date/Time    LABGRAM  02/15/2022 11:00 AM     No segmented neutrophils observed. Few epithelial cells observed. Many gram positive cocci occurring singly and in pairs. Many gram negative bacilli. Few gram positive bacilli. MRSA culture only:No results found for: [facility]    Urine culture:   Lab Results   Component Value Date/Time    LABURIN No growth-preliminary 12/27/2022 05:20 PM    LABURIN [place] count: 50,000-90,000 CFU/mL 12/27/2022 05:20 PM       Respiratory culture: No results found for: CULTRESP    Aerobic and Anaerobic :  Lab Results   Component Value Date/Time    LABAERO No Acinetobacter species isolated.  12/27/2022 08:30 PM    LABAERO No Acinetobacter species isolated 12/27/2022 08:30 PM     No results found for: LABANAE    Urinalysis:      Lab Results   Component Value Date/Time    NITRU NEGATIVE 12/27/2022 05:20 PM    WBCUA 0-2 12/27/2022 05:20 PM    BACTERIA FEW 12/27/2022 05:20 PM    RBCUA 0-2 12/27/2022 05:20 PM    BLOODU NEGATIVE 12/27/2022 05:20 PM    SPECGRAV >1.030 12/27/2022 05:20 PM    GLUCOSEU NEGATIVE 12/25/2018 08:00 PM       Radiology:  CTA CHEST Annie Garcia CONTRAST   Final Result       1. No pulmonary embolism   2. Bilateral lower lobe airspace infiltrates   3. Scattered tree-in-bud opacities in the right upper lobe consistent with infectious or inflammatory etiology. **This report has been created using voice recognition software. It may contain minor errors which are inherent in voice recognition technology. **      Final report electronically signed by Dr. Mary Staples on 12/27/2022 2:52 PM      XR CHEST (2 VW)   Final Result   1. Normal heart size. 2. Mild left basilar atelectasis/pneumonia. No effusion is seen. 3. Questionable additional patch of mild infiltrate left midlung laterally. **This report has been created using voice recognition software. It may contain minor errors which are inherent in voice recognition technology. **      Final report electronically signed by Dr. Ebony Carranza on 12/27/2022 11:48 AM        XR CHEST (2 VW)    Result Date: 12/27/2022  PROCEDURE: XR CHEST (2 VW) CLINICAL INFORMATION: sob COMPARISON: 10/10/2018 TECHNIQUE: PA and lateral views of the chest were obtained. 1. Normal heart size. 2. Mild left basilar atelectasis/pneumonia. No effusion is seen. 3. Questionable additional patch of mild infiltrate left midlung laterally. **This report has been created using voice recognition software. It may contain minor errors which are inherent in voice recognition technology. ** Final report electronically signed by Dr. Ebony Carranza on 12/27/2022 11:48 AM    CTA CHEST W WO CONTRAST    Result Date: 12/27/2022  PROCEDURE: CTA CHEST W WO CONTRAST CLINICAL INFORMATION: rule out pe. Short of breath COMPARISON: 5/25/2011 TECHNIQUE: 3 mm axial images were obtained through the chest after the administration of IV contrast.  A non-contrast localizer was obtained. 3D reconstructions were performed on the scanner to include MIP coronal and sagittal images through the chest. Isovue was the intravenous contrast utilized. All CT scans at this facility use dose modulation, iterative reconstruction, and/or weight-based dosing when appropriate to reduce radiation dose to as low as reasonably achievable. FINDINGS: There is adequate opacification of the pulmonary arterial system. No pulmonary emboli are present. The aorta is within acceptable limits. The heart size is normal. There is no pericardial or pleural effusion. Multiple nonenlarged mediastinal lymph nodes similar to the prior exam. Calcified subcarinal nodes. Evaluation of the lungs is limited by motion and streak artifact. There are tree-in-bud opacities in the lateral right upper lobe. There are bilateral lower lobe airspace infiltrates. There is no associated effusion. No suspicious osseous lesions are present. There are no suspicious findings in the imaged aspects of the upper abdomen. 1. No pulmonary embolism 2. Bilateral lower lobe airspace infiltrates 3. Scattered tree-in-bud opacities in the right upper lobe consistent with infectious or inflammatory etiology. **This report has been created using voice recognition software. It may contain minor errors which are inherent in voice recognition technology. ** Final report electronically signed by Dr. Luis Parker on 12/27/2022 2:52 PM      Electronically signed by Harley Joyner DO on 1/1/2023 at 9:37 AM

## 2023-01-02 ENCOUNTER — APPOINTMENT (OUTPATIENT)
Dept: GENERAL RADIOLOGY | Age: 68
DRG: 871 | End: 2023-01-02
Payer: MEDICARE

## 2023-01-02 PROBLEM — J18.9 PNEUMONIA DUE TO INFECTIOUS ORGANISM: Status: ACTIVE | Noted: 2023-01-02

## 2023-01-02 PROBLEM — Z77.22 TOBACCO SMOKE EXPOSURE: Status: ACTIVE | Noted: 2023-01-02

## 2023-01-02 LAB — PRO-BNP: 334.4 PG/ML (ref 0–124)

## 2023-01-02 PROCEDURE — 6360000002 HC RX W HCPCS: Performed by: STUDENT IN AN ORGANIZED HEALTH CARE EDUCATION/TRAINING PROGRAM

## 2023-01-02 PROCEDURE — 6370000000 HC RX 637 (ALT 250 FOR IP): Performed by: STUDENT IN AN ORGANIZED HEALTH CARE EDUCATION/TRAINING PROGRAM

## 2023-01-02 PROCEDURE — 6360000002 HC RX W HCPCS: Performed by: PHARMACIST

## 2023-01-02 PROCEDURE — 2580000003 HC RX 258: Performed by: STUDENT IN AN ORGANIZED HEALTH CARE EDUCATION/TRAINING PROGRAM

## 2023-01-02 PROCEDURE — 94760 N-INVAS EAR/PLS OXIMETRY 1: CPT

## 2023-01-02 PROCEDURE — 6370000000 HC RX 637 (ALT 250 FOR IP): Performed by: INTERNAL MEDICINE

## 2023-01-02 PROCEDURE — 94640 AIRWAY INHALATION TREATMENT: CPT

## 2023-01-02 PROCEDURE — 36415 COLL VENOUS BLD VENIPUNCTURE: CPT

## 2023-01-02 PROCEDURE — 2060000000 HC ICU INTERMEDIATE R&B

## 2023-01-02 PROCEDURE — 71046 X-RAY EXAM CHEST 2 VIEWS: CPT

## 2023-01-02 PROCEDURE — 2700000000 HC OXYGEN THERAPY PER DAY

## 2023-01-02 PROCEDURE — 83880 ASSAY OF NATRIURETIC PEPTIDE: CPT

## 2023-01-02 RX ORDER — NICOTINE 21 MG/24HR
1 PATCH, TRANSDERMAL 24 HOURS TRANSDERMAL DAILY
Status: DISCONTINUED | OUTPATIENT
Start: 2023-01-02 | End: 2023-01-04 | Stop reason: HOSPADM

## 2023-01-02 RX ORDER — IPRATROPIUM BROMIDE AND ALBUTEROL SULFATE 2.5; .5 MG/3ML; MG/3ML
1 SOLUTION RESPIRATORY (INHALATION)
Status: DISCONTINUED | OUTPATIENT
Start: 2023-01-02 | End: 2023-01-03

## 2023-01-02 RX ORDER — METHYLPREDNISOLONE SODIUM SUCCINATE 40 MG/ML
40 INJECTION, POWDER, LYOPHILIZED, FOR SOLUTION INTRAMUSCULAR; INTRAVENOUS EVERY 12 HOURS
Status: DISCONTINUED | OUTPATIENT
Start: 2023-01-02 | End: 2023-01-03

## 2023-01-02 RX ORDER — IPRATROPIUM BROMIDE AND ALBUTEROL SULFATE 2.5; .5 MG/3ML; MG/3ML
1 SOLUTION RESPIRATORY (INHALATION) 3 TIMES DAILY
Status: DISCONTINUED | OUTPATIENT
Start: 2023-01-02 | End: 2023-01-02

## 2023-01-02 RX ORDER — GUAIFENESIN 600 MG/1
600 TABLET, EXTENDED RELEASE ORAL 2 TIMES DAILY
Status: DISCONTINUED | OUTPATIENT
Start: 2023-01-02 | End: 2023-01-04 | Stop reason: HOSPADM

## 2023-01-02 RX ADMIN — SODIUM CHLORIDE, PRESERVATIVE FREE 10 ML: 5 INJECTION INTRAVENOUS at 09:27

## 2023-01-02 RX ADMIN — IPRATROPIUM BROMIDE AND ALBUTEROL SULFATE 1 AMPULE: .5; 3 SOLUTION RESPIRATORY (INHALATION) at 12:57

## 2023-01-02 RX ADMIN — METOPROLOL TARTRATE 12.5 MG: 25 TABLET, FILM COATED ORAL at 09:27

## 2023-01-02 RX ADMIN — GUAIFENESIN 600 MG: 600 TABLET, EXTENDED RELEASE ORAL at 12:46

## 2023-01-02 RX ADMIN — IPRATROPIUM BROMIDE AND ALBUTEROL SULFATE 1 AMPULE: .5; 3 SOLUTION RESPIRATORY (INHALATION) at 08:55

## 2023-01-02 RX ADMIN — METOPROLOL TARTRATE 12.5 MG: 25 TABLET, FILM COATED ORAL at 20:51

## 2023-01-02 RX ADMIN — ENOXAPARIN SODIUM 30 MG: 100 INJECTION SUBCUTANEOUS at 09:27

## 2023-01-02 RX ADMIN — ENOXAPARIN SODIUM 30 MG: 100 INJECTION SUBCUTANEOUS at 20:50

## 2023-01-02 RX ADMIN — QUETIAPINE FUMARATE 600 MG: 400 TABLET ORAL at 20:50

## 2023-01-02 RX ADMIN — CEFTRIAXONE SODIUM 1000 MG: 1 INJECTION, POWDER, FOR SOLUTION INTRAMUSCULAR; INTRAVENOUS at 12:45

## 2023-01-02 RX ADMIN — IPRATROPIUM BROMIDE AND ALBUTEROL SULFATE 1 AMPULE: .5; 3 SOLUTION RESPIRATORY (INHALATION) at 15:31

## 2023-01-02 RX ADMIN — METHYLPREDNISOLONE SODIUM SUCCINATE 40 MG: 40 INJECTION, POWDER, FOR SOLUTION INTRAMUSCULAR; INTRAVENOUS at 12:46

## 2023-01-02 RX ADMIN — IPRATROPIUM BROMIDE AND ALBUTEROL SULFATE 1 AMPULE: .5; 3 SOLUTION RESPIRATORY (INHALATION) at 20:17

## 2023-01-02 RX ADMIN — GUAIFENESIN 600 MG: 600 TABLET, EXTENDED RELEASE ORAL at 20:50

## 2023-01-02 RX ADMIN — PAROXETINE HYDROCHLORIDE 20 MG: 20 TABLET, FILM COATED ORAL at 09:27

## 2023-01-02 RX ADMIN — PREDNISONE 40 MG: 20 TABLET ORAL at 09:27

## 2023-01-02 ASSESSMENT — PAIN SCALES - GENERAL
PAINLEVEL_OUTOF10: 0

## 2023-01-02 NOTE — PROGRESS NOTES
Hospitalist Progress Note      Patient:  Hannah Fuentes    Unit/Bed:4K-18/018-A  YOB: 1955  MRN: [id number]   Acct: [id number]   PCP: BRIGETTE HERNANDEZ CNP  Date of Admission: 12/27/2022    Assessment/Plan:    Sepsis, POA  CAP  Given new pulmonary infiltrates on CXR and CTA, will treat as CAP  Completed 5 days of azithromycin.  Currently on rocephin.  Will need 7-10 days.   Sputum cultures/PNA PCR uncollected. and blood cultures ngtd  AECOPD  Likely due to #2  Continue symbicort.  Completed 5 day sof systemic glycocorticoids.  Still requiring increased supplemental oxygen with persistent rhonchi and wheezes.  Started back on prednisone with planned prolonged taper.   Duonebs TID and prn  Will consult pulmonology given difficulty with weaning.  Does not currently follow with pulmonary outpatient.   Acute on chronic hypoxic respiratory failure  Due to #2 and 3  Treatment as above.   Pulm consult today.   ABRAN on CKD IIIa, resolved.   Hold NSAIDs  Avoid nephrotoxic agents  Multiple sclerosis  On Vumerity, previously on Tecfidera  HTN  Lopressor held for hypotension on arrival.  Resumed on 12/30  Hypothyroidism  On levothyroxine  GERD  PPI held due to ABRAN.  Ok to resume.   HLD  On statin  Schizoaffective d/o with bipolar and anxiety  Ok to resume seroquel and paxil today.  Holding trazodone and atarax.  Will need to verify Ability rx.     Disposition: Likely discharge home in 24-48 hours.  Planning home with [facility].  Declined SNF.  May need to consider LTAC if prolonged weaning from HHFNC anticipated.     Chief Complaint: SOB    Hospital Course:    Per H&P - Hannah Fuentes is a 67 y.o. female with PMHx of hypertension, multiple cirrhosis, hypothyroidism, GERD, HLD, schizoaffective disorder, tobacco use disorder and CKD 3 who presented to [facility] with complaint of shortness of breath but was not entirely clear about her symptoms  of concern. Patient is visibly disheveled appearing unbathed and not well kept. Patient reports that she believes she may have the flu but denies vaccination. She reports using home oxygen at 3 L/min via nasal cannula and requiring additional oxygen support from her home pulse oximetry. She has associated symptoms of chills but was uncertain about fever. The remainder of the exam was inhibited by the patient's mental status as she was lethargic but easily rousable while intermittently falling asleep. Within the emergency department the patient was unable to maintain appropriate oxygen saturations and required escalating supplemental oxygen via nasal cannula which was subsequently escalated to high flow nasal cannula. She was administered 1 dose of ceftriaxone and doxycycline as well as 1 DuoNeb treatment. 12/28: weaned to 6L NC but with desaturations overnight and placed back on HHFNC.      12/31: weaned to 3L NC and tolerated for much of the day    1/1: worsening hypoxia. Back on 6L NC. Started back on systemic glucocorticoids with plan for prolonged taper. Subjective (past 24 hours): She states she feels ok but is still wheezing with increased cough. Requiring 6L NC again today in the low 90's. Past medical history, family history, social history and allergies reviewed again and is unchanged since admission. ROS (12 point review of systems completed. Pertinent positives noted.  Otherwise ROS is negative)     Medications:  Reviewed    Infusion Medications    sodium chloride       Scheduled Medications    nicotine  1 patch TransDERmal Daily    cefTRIAXone (ROCEPHIN) IV  1,000 mg IntraVENous Q24H    methylPREDNISolone  40 mg IntraVENous Q12H    guaiFENesin  600 mg Oral BID    QUEtiapine  600 mg Oral QHS    PARoxetine  20 mg Oral QAM    metoprolol tartrate  12.5 mg Oral BID    ipratropium-albuterol  1 ampule Inhalation Q4H WA    sodium chloride flush  5-40 mL IntraVENous 2 times per day enoxaparin  30 mg SubCUTAneous Q12H     PRN Meds: sodium chloride flush, sodium chloride, ondansetron **OR** ondansetron, polyethylene glycol, acetaminophen **OR** acetaminophen, ipratropium-albuterol      Intake/Output Summary (Last 24 hours) at 1/2/2023 1231  Last data filed at 1/2/2023 0252  Gross per 24 hour   Intake 800 ml   Output --   Net 800 ml         Diet:  ADULT DIET; Regular; Low Fat/Low Chol/High Fiber/KENNY    Exam:  BP (!) 118/55   Pulse 75   Temp 97.6 °F (36.4 °C) (Oral)   Resp 22   Ht 5' 6\" (1.676 m)   Wt 220 lb 8 oz (100 kg)   SpO2 94%   BMI 35.59 kg/m²   General appearance: No apparent distress, appears stated age and cooperative. HEENT: Pupils equal, round, and reactive to light. Conjunctivae/corneas clear. Neck: Supple, with full range of motion. No jugular venous distention. Trachea midline. Respiratory:  Diminished breath sounds throughout all lung fields. Trace wheezes. No rhonchi. Mild to moderate scattered wheezes. Cardiovascular: Regular rate and rhythm with normal S1/S2 without murmurs, rubs or gallops. Abdomen: Soft, non-tender, non-distended with normal bowel sounds. Musculoskeletal: passive and active ROM x 4 extremities. Skin: Skin color, texture, turgor normal.  No rashes or lesions. Neurologic:  Neurovascularly intact without any focal sensory/motor deficits. Cranial nerves: II-XII intact, grossly non-focal.  Psychiatric: Alert and oriented, thought content appropriate  Capillary Refill: Brisk,< 3 seconds   Peripheral Pulses: +2 palpable, equal bilaterally     Labs:   Recent Labs     01/01/23  0835   WBC 7.0   HGB 13.2   HCT 41.9          Recent Labs     01/01/23  0835      K 4.1   CL 99   CO2 31   BUN 18   CREATININE 0.7   CALCIUM 8.6       No results for input(s): AST, ALT, BILIDIR, BILITOT, ALKPHOS in the last 72 hours. No results for input(s): INR in the last 72 hours.   No results for input(s): Giovanny Reed in the last 72 hours.    Microbiology:    Blood culture #1:   Lab Results   Component Value Date/Time    [facility]  12/27/2022 04:45 PM     No growth 24 hours. No growth 48 hours. No growth at 5 days       Blood culture #2:No results found for: BLOODCULT2    Organism:  Lab Results   Component Value Date/Time    ORG Jo glabrata 12/27/2022 05:20 PM         Lab Results   Component Value Date/Time    LABGRAM  02/15/2022 11:00 AM     No segmented neutrophils observed. Few epithelial cells observed. Many gram positive cocci occurring singly and in pairs. Many gram negative bacilli. Few gram positive bacilli. MRSA culture only:No results found for: [facility] [employer]    Urine culture:   Lab Results   Component Value Date/Time    LABURIN No growth-preliminary 12/27/2022 05:20 PM    LABURIN [place] count: 50,000-90,000 CFU/mL 12/27/2022 05:20 PM       Respiratory culture: No results found for: CULTRESP    Aerobic and Anaerobic :  Lab Results   Component Value Date/Time    LABAERO No Acinetobacter species isolated. 12/27/2022 08:30 PM    LABAERO No Acinetobacter species isolated 12/27/2022 08:30 PM     No results found for: LABANAE    Urinalysis:      Lab Results   Component Value Date/Time    NITRU NEGATIVE 12/27/2022 05:20 PM    WBCUA 0-2 12/27/2022 05:20 PM    BACTERIA FEW 12/27/2022 05:20 PM    RBCUA 0-2 12/27/2022 05:20 PM    BLOODU NEGATIVE 12/27/2022 05:20 PM    SPECGRAV >1.030 12/27/2022 05:20 PM    GLUCOSEU NEGATIVE 12/25/2018 08:00 PM       Radiology:  XR CHEST (2 VW)   Final Result   Persistent, but improved infiltrate in the left lower lobe. **This report has been created using voice recognition software. It may contain minor errors which are inherent in voice recognition technology. **      Final report electronically signed by Dr. Jen Muniz on 1/2/2023 11:39 AM      CTA CHEST [address]   Final Result       1. No pulmonary embolism   2. Bilateral lower lobe airspace infiltrates   3.  Scattered tree-in-bud opacities in the right upper lobe consistent with infectious or inflammatory etiology. **This report has been created using voice recognition software. It may contain minor errors which are inherent in voice recognition technology. **      Final report electronically signed by Dr. Srinivas Clark on 12/27/2022 2:52 PM      XR CHEST (2 VW)   Final Result   1. Normal heart size. 2. Mild left basilar atelectasis/pneumonia. No effusion is seen. 3. Questionable additional patch of mild infiltrate left midlung laterally. **This report has been created using voice recognition software. It may contain minor errors which are inherent in voice recognition technology. **      Final report electronically signed by Dr. Kathleen Burroughs on 12/27/2022 11:48 AM        XR CHEST (2 VW)    Result Date: 12/27/2022  PROCEDURE: XR CHEST (2 VW) CLINICAL INFORMATION: sob COMPARISON: 10/10/2018 TECHNIQUE: PA and lateral views of the chest were obtained. 1. Normal heart size. 2. Mild left basilar atelectasis/pneumonia. No effusion is seen. 3. Questionable additional patch of mild infiltrate left midlung laterally. **This report has been created using voice recognition software. It may contain minor errors which are inherent in voice recognition technology. ** Final report electronically signed by Dr. Kathleen Burroughs on 12/27/2022 11:48 AM    CTA CHEST W WO CONTRAST    Result Date: 12/27/2022  PROCEDURE: CTA CHEST W WO CONTRAST CLINICAL INFORMATION: rule out pe. Short of breath COMPARISON: 5/25/2011 TECHNIQUE: 3 mm axial images were obtained through the chest after the administration of IV contrast.  A non-contrast localizer was obtained. 3D reconstructions were performed on the scanner to include MIP coronal and sagittal images through the chest. Isovue was the intravenous contrast utilized.  All CT scans at this facility use dose modulation, iterative reconstruction, and/or weight-based dosing when appropriate to reduce radiation dose to as low as reasonably achievable. FINDINGS: There is adequate opacification of the pulmonary arterial system. No pulmonary emboli are present. The aorta is within acceptable limits. The heart size is normal. There is no pericardial or pleural effusion. Multiple nonenlarged mediastinal lymph nodes similar to the prior exam. Calcified subcarinal nodes. Evaluation of the lungs is limited by motion and streak artifact. There are tree-in-bud opacities in the lateral right upper lobe. There are bilateral lower lobe airspace infiltrates. There is no associated effusion. No suspicious osseous lesions are present. There are no suspicious findings in the imaged aspects of the upper abdomen. 1. No pulmonary embolism 2. Bilateral lower lobe airspace infiltrates 3. Scattered tree-in-bud opacities in the right upper lobe consistent with infectious or inflammatory etiology. **This report has been created using voice recognition software. It may contain minor errors which are inherent in voice recognition technology. ** Final report electronically signed by Dr. Dania Dozier on 12/27/2022 2:52 PM      Electronically signed by Sesar Norris DO on 1/2/2023 at 12:31 PM

## 2023-01-02 NOTE — CONSULTS
[facility]      Patient - Bobby Coon   MRN -  [id number]   Acct # - [de-identified]   - 1955      Date of Admission -  2022 11:11 AM  Date of evaluation -  2023  Room - -018-A   Hospital Day - [facility] Primary Care Physician - Lilyan Felty, APRN - CNP     Problem List      Active Hospital Problems    Diagnosis Date Noted    COPD exacerbation [facility]) [J44.1] 2022     Priority: Medium     Reason for Consult    Weaning patient down to baseline 3 L O2  HPI   History Obtained From: Patient  and electronic medical record. Bobby Coon is a pleasant 79 y.o. female with PMHx HTN, MS, hypothyroid, GERD, HLD, schizoaffective disorder, tobacco use disorder, and CKD 3 who presented to [facility] ED with CC: Shortness of breath on 2022. Patient stated that she thought she had the flu. She says she had not been vaccinated for COVID or the flu. She stated she started feeling short of breath at home but did not have a pulse ox to take her O2 stats. She stated she was on 3 L O2. In ED she was satting at 89% on 3 L. She endorsed chills and possible fevers. She stated that she seems to be using more of her regular inhalers at home than normal.  Patient had no history of PE, was not using blood thinners, and not ambulating much over past few days before coming in. Patient denied chest pain but said she had been coughing up blood-tinged sputum. She denied any history of TB. Denied history of CHF. But states she has been using more pillows at night and endorsed some mild weight gain. Denied hormone use, cancer, abdominal pain, diarrhea or constipation. On presentation patient was visibly disheveled, appeared unbathed and not well kept. Patient was lethargic but easily arousable when she intermittently fell asleep.   Patient was unable to maintain appropriate oxygen saturation in ED and subsequently escalated to HFNC.  She also received 1 dose of ceftriaxone and doxycycline in ED. By 12/31 patient had been weaned down to 3 L through most of the day. However, in the last 2 days she has needed increased levels of oxygen is currently on 6 L NC. Feels better today. Got medicine at home and dog and plenty of visitors. She is having shortness of breath: Yes  Onset: improving   Duration:5minutes. Diurnal variation:  None. Worse  Happens intermittently   block/s on level ground. She can climb steps: No  Can't walk on right leg- broken  Flights of steps she can climb: 1  She admits to orthopnea. She admits to paroxysmal nocturnal dyspnea. She is having cough: Yes  Duration of cough: for 7 days. Her cough is associated with sputum production: Yes   The sputum color: yellow  Hemoptysis:Yes  Diurnal variation: None. Worse in the morning  Relieving factors: No  Aggravating factors: No      She is having chest pain:No  Scale:denies/10  Relation ship to breathing: no pain with inspiration.         PMHx   Past Medical History      Diagnosis Date    Bipolar disorder ([address]     Cerebral artery occlusion with cerebral infarction (HCC)     Chronic back pain     Colon polyps     COPD (chronic obstructive pulmonary disease) (HCC)     Depression     Dermatitis seborrheica     DJD (degenerative joint disease)     Heart problem     Hyperlipidemia     Multiple sclerosis ([address] dx 2003    Relapse/Remitting type    Need for other prophylactic chemotherapy(V07.39)     Obesity     Osteoarthritis     Back    Osteopenia     Panic disorder without agoraphobia     Personal history of fall     Schizoaffective disorder (HCC)     Secondary parkinsonism (HCC)     Seizures (HCC)     Sinusitis     Tension headache, chronic     Weight loss       Past Surgical History        Procedure Laterality Date    APPENDECTOMY      CARDIOVASCULAR STRESS TEST  05/2011    COLONOSCOPY      7/2014,2022    EGD  2022    Dr Nathaly Lucero ([facility] [address] UNKNOWN)      MAMMO IMPLANT DIGITAL DIAG BI  06/2011    repeat in 12/2011    SHOULDER SURGERY      LEONILA AND BSO (CERVIX REMOVED)       Meds    Current Medications    nicotine  1 patch TransDERmal Daily    cefTRIAXone (ROCEPHIN) IV  1,000 mg IntraVENous Q24H    predniSONE  40 mg Oral Daily    Followed by    Anitra Soto ON 1/6/2023] predniSONE  30 mg Oral Daily    Followed by    Anitra Soto ON 1/11/2023] predniSONE  20 mg Oral Daily    Followed by    Anitra Soto ON 1/16/2023] predniSONE  10 mg Oral Daily    Followed by    Anitra Soto ON 1/21/2023] predniSONE  5 mg Oral Daily    QUEtiapine  600 mg Oral QHS    PARoxetine  20 mg Oral QAM    metoprolol tartrate  12.5 mg Oral BID    ipratropium-albuterol  1 ampule Inhalation Q4H WA    sodium chloride flush  5-40 mL IntraVENous 2 times per day    enoxaparin  30 mg SubCUTAneous Q12H     sodium chloride flush, sodium chloride, ondansetron **OR** ondansetron, polyethylene glycol, acetaminophen **OR** acetaminophen, ipratropium-albuterol  IV Drips/Infusions   sodium chloride       Home Medications  Medications Prior to Admission: dalfampridine (AMPYRA) 10 MG extended release tablet, Take 10 mg by mouth in the morning and 10 mg in the evening.   ARIPiprazole er (ABILIFY MAINTENA) 300 MG PRSY prefilled syringe, Inject 300 mg into the muscle every 30 days  QUEtiapine (SEROQUEL) 400 MG tablet, Take 600 mg by mouth nightly  traZODone (DESYREL) 100 MG tablet, Take 100-200 mg by mouth nightly as needed for Sleep  pantoprazole (PROTONIX) 20 MG tablet, Take 1 tablet by mouth every morning (before breakfast)  budesonide-formoterol (SYMBICORT) 160-4.5 MCG/ACT AERO, Inhale 2 puffs into the lungs 2 times daily  PARoxetine (PAXIL) 20 MG tablet, Take 20 mg by mouth every morning  OXYGEN, Inhale 3 L into the lungs continuous  calcium-vitamin D (OSCAL-500) 500-200 MG-UNIT per tablet, Take 1 tablet by mouth 2 times daily  meloxicam (MOBIC) 15 MG tablet, Take 15 mg by mouth daily  hydrOXYzine (ATARAX) 25 MG tablet, Take 25 mg by mouth every 8 hours as needed for Anxiety  famotidine (PEPCID) 20 MG tablet, Take 20 mg by mouth 2 times daily  atorvastatin (LIPITOR) 40 MG tablet, Take 40 mg by mouth daily  aspirin 81 MG EC tablet, Take 81 mg by mouth daily  albuterol sulfate HFA (PROVENTIL;VENTOLIN;PROAIR) 108 (90 Base) MCG/ACT inhaler, Inhale 1 puff into the lungs every 6 hours as needed for Wheezing  [DISCONTINUED] Diroximel Fumarate 231 MG CPDR, Take 2 caplets by mouth in the morning and at bedtime  [DISCONTINUED] nystatin (MYCOSTATIN) 308518 UNIT/GM powder, Apply topically 3 times daily Apply topically 3 times daily. [DISCONTINUED] traZODone (DESYREL) 50 MG tablet, Take 1 tablet by mouth nightly as needed for Sleep (Patient taking differently: No sig reported)  [DISCONTINUED] QUEtiapine (SEROQUEL) 100 MG tablet, Take 5 tablets by mouth nightly (Patient taking differently: Take 200 mg by mouth 2 times daily)  metoprolol tartrate (LOPRESSOR) 25 MG tablet, Take 0.5 tablets by mouth 2 times daily  levothyroxine (SYNTHROID) 50 MCG tablet, Take 1 tablet by mouth Daily  Diet    ADULT DIET;  Regular; Low Fat/Low Chol/High Fiber/KENNY  Allergies    Adhesive tape  Social History     Social History     Socioeconomic History    Marital status:      Spouse name: Not on file    Number of children: 0    Years of education: 11    Highest education level: Not on file   Occupational History    Occupation: disability   Tobacco Use    Smoking status: Every Day     Packs/day: 0.50     Years: 20.00     Pack years: 10.00     Types: Cigarettes    Smokeless tobacco: Never   Vaping Use    Vaping Use: Never used   Substance and Sexual Activity    Alcohol use: Not Currently     Comment: occasionally    Drug use: Not Currently     Types: Marijuana Gagan Schroeder     Comment: last use yesterday    Sexual activity: Never   Other Topics Concern    Not on file   Social History Narrative    Not on file     Social Determinants of Health     Financial Resource Strain: Not on file   Food Insecurity: Not on file   Transportation Needs: Not on file   Physical Activity: Not on file   Stress: Not on file   Social Connections: Not on file   Intimate Partner Violence: Not on file   Housing Stability: Not on file     Family History          Problem Relation Age of Onset    Other Mother         back    Diabetes Mother     Cancer Mother     Diabetes Father     Cancer Brother     Cancer Brother      Sleep History    Never diagnosed with sleep apnea in the past.  Occupational history   Occupation:  She is current working: No  Type of profession: retired/disability. History of tobacco smoking:Yes  Amount of tobacco smokin.0 PPD. Years of tobacco smoking: 10.                                    Quit smoking: No.              Quit year:N/A   Current smoker: Yes. Amount of current tobacco smokin.25 PPD  . History of recreational or IV drug use in the past:NO     History of exposure to coal mines/coal dust: NO  History of exposure to foundry dust/welding: NO  History of exposure to quarry/silica/sandblasting: NO  History of exposure to asbestos/working with breaks/ships: NO  History of exposure to farm dust: NO  History of recent travel to long distances: NO  History of exposure to birds, pigeons, or chickens in the past:NO  Pet animals at home:Yes  Dogs: 1  Cats: 0    History of pulmonary embolism in the past: No            History of DVT in the past:No                  Riview of systems   General/Constitutional: + Fat, -WLWG  HENT:   Eyes: -VC / discharge  Upper respiratory tract:    Lower respiratory tract/ lungs:   Cardiovascular: CP/Palp  Gastrointestinal: CSTP N no v/diarrhea  Neurological:   Extremities: numb/ting hands  Musculoskeletal:   Genitourinary: dysuria,   Hematological: no  Psychiatric/Behavioral:   Skin: some pruritis. Vitals     height is 5' 6\" (1.676 m) and weight is 220 lb 8 oz (100 kg).  Her oral temperature is 98.5 °F (36.9 °C). Her blood pressure is 119/56 (abnormal) and her pulse is 88. Her respiration is 20 and oxygen saturation is 90%. Body mass index is 35.59 kg/m². SUPPLEMENTAL O2: O2 Flow Rate (L/min): 6 L/min     I/O      Intake/Output Summary (Last 24 hours) at 1/2/2023 1039  Last data filed at 1/2/2023 0252  Gross per 24 hour   Intake 800 ml   Output --   Net 800 ml     I/O last 3 completed shifts: In: 1040 [P.O.:1040]  Out: -    Patient Vitals for the past 96 hrs (Last 3 readings):   Weight   01/01/23 0312 220 lb 8 oz (100 kg)   12/31/22 0314 221 lb 11.2 oz (100.6 kg)       Exam   Nursing note and vitals reviewed. Constitutional: Obese, older female, sitting on chair, wearing nasal cannula, in no apparent distress  HENT:   Head: NC/AT  Right Ear: Normal external ear appearance. Hearing intact  Left Ear: Normal external ear appearance. Hearing intact  Mouth/Throat: Moist mucous membranes. No pharyngeal erythema  Eyes: EOMI, no scleral icterus  Neck: Supple, no lymphadenopathy  Cardiovascular: RRR, no murmur/rub/gallop  Pulmonary/Chest:   Diffuse wheezing all lung fields. Occasional cough. No rales or rhonchi.   Abdominal: Normal bowel sounds, no tenderness to palpation  Extremities: 2+ pulses bilateral  Lymphadenopathy: None noted  Neurological: A&O x3  Skin: Warm and dry  Psychiatric: Pleasant and cooperative    Labs  - Old records and notes have been reviewed in [facility]   ABG  Lab Results   Component Value Date/Time    PH 7.40 08/31/2017 08:31 AM    PO2 75 08/31/2017 08:31 AM    PCO2 39 08/31/2017 08:31 AM    HCO3 24 08/31/2017 08:31 AM    O2SAT 95 08/31/2017 08:31 AM     Lab Results   Component Value Date/Time    IFIO2 2 08/31/2017 08:31 AM     CBC  Recent Labs     01/01/23  0835   WBC 7.0   RBC 4.92   HGB 13.2   HCT 41.9   MCV 85.2   MCH 26.8   MCHC 31.5*      MPV 8.9*      BMP  Recent Labs     01/01/23  0835      K 4.1   CL 99   CO2 31   BUN 18   CREATININE 0.7   GLUCOSE 88   CALCIUM 8.6 LFT  No results for input(s): AST, ALT, ALB, BILITOT, ALKPHOS, LIPASE in the last 72 hours. Invalid input(s): AMYLASE  TROP  Lab Results   Component Value Date/Time    TROPONINT < 0.010 12/27/2022 11:29 AM    TROPONINT < 0.010 10/10/2018 04:03 PM    TROPONINT < 0.010 10/06/2018 05:06 PM     BNP  No results for input(s): BNP in the last 72 hours. Lactic Acid  No results for input(s): LACTA in the last 72 hours. INR  No results for input(s): INR, PROTIME in the last 72 hours. PTT  No results for input(s): APTT in the last 72 hours. Glucose  No results for input(s): POCGLU in the last 72 hours. UA No results for input(s): SPECGRAV, PHUR, COLORU, CLARITYU, MUCUS, PROTEINU, BLOODU, RBCUA, WBCUA, BACTERIA, NITRU, GLUCOSEU, BILIRUBINUR, UROBILINOGEN, KETUA, LABCAST, LABCASTTY, AMORPHOS in the last 72 hours. Invalid input(s): CRYSTALS. PFTs       Sleep studies   -Need sleep study records from Dr. Melinda Huber at [facility]. NGTD, Aerobic NGTD, Urine + for Candida, no Pneumo PCR, Respiratory Cx, MRSA/VRE Neg, Covid/Flu negative    EKG     Echocardiogram   -Recommend Echo    Radiology    CXR      CT Scans  (See actual reports for details)      Assessment   -Acute on chronic respiratory failure: Baseline 2LNC. 2/2 below COPD exacerbation and pneumonia. Possible component of CHF, patient reports 3-4 pillow orthopnea. No echo on file patient currently on 6 L.  -Moderate COPD:  in acute exacerbation.  -Currently on Prednisone.  -On Rocephin and Zithromax. Received last dose of Zithromax.  -DuoNebs every 4 hours while awake  -Pneumonia- Unclear Etiology:  -CT: Bilat LL airspace infiltrates, scattered tree-in-bud opacities RUL consistent with infectious or inflammatory etiology. -CXR mild left basilar atelectasis/pneumonia. Possible mild left midlung infiltrate.  -Patient on Rocephin and just completed last dose of Zithromax.   -MELI on CPAP: at home  -Hx of tobacco abuse & current smoker: Patient has a 10-year smoking history. Less than 1 pack a day. Currently smokes 2 to 3 cigarettes a day. Plan   -Try to wean down O2 to home 2L dose  -Order BNP  -Suggest ordering Echo.  -Discontinue p.o. prednisone  -Start patient on IV methylprednisolone 40 mg twice daily  -Place pt on Mucinex 600 BID  -Vest therapy  -Continue Rocephin  -Pt to bring in home CPAP. Until then will use hospital CPAP 12 mmH2O at night  -Need sleep study records from Dr. Obed Mcgowan at [facility]  -Will need Home O2 eval at discharge  -Recommend smoking cessation    \"Thank you for asking us to see this patient\"    Questions and concerns addressed. Electronically signed by   Ying Bryant DO on 1/2/2023 at 10:39 AM     Addendum by Dr. Amy Mitchell MD:  Patient seen by me independently including key components of medical care. Face to face evaluation and examination was performed. Case discussed with Chris Gonzalez DO -resident physician. Italicized font, if present,  represents changes to the note made by me. More than 50% of the encounter time involved with patient care by the Pulmonary & Critical care service team spent by me. Please see my modifications mentioned below:  She is a 26-year-old pleasant female with a chronic history of tobacco smoking for 10 years with less than 1 pack of cigarettes per day. She is still smoking 2 to 3 cigarettes/day. She used to follow with ? Dr. Francesco Butts. DO [place] at [facility] regarding her sleep apnea and ? COPD. She is currently taking 2 inhalers. She is also on home oxygen at home. However she is not using her home oxygen with good compliance. She is using her CPAP device at nighttime.     Plan:  Continue steroids with the Solu-Medrol 40 mg IV twice daily  CPAP with a pressure of 12 cm of water at nighttime and as needed during daytime  Will order echocardiogram to check for LV function  Mucinex 600 mg p.o. twice daily  Continue DuoNebs 3 ml via nebulization every 4 hourly while awake. Chest vest percussion therapy every 8 hourly as tolerated  She was advised to bring her home CPAP device to use it at nighttime  Patient educated to quit smoking as soon as possible. Patient verbalizes understanding of adverse consequences of tobacco smoking.   Avoid all sedative medications if patient is drowsy    Electronically signed by   Josh Fontaine MD on 1/2/2023 at 2:23 PM

## 2023-01-02 NOTE — FLOWSHEET NOTE
01/02/23 0744   Treatment Team Notification   Reason for Communication Patient/Family request   Team Member Name [address]   Treatment Team Role Attending Provider   Method of Communication Secure Message   Response Waiting for response   Notification Time 0745     Patient requesting a nicotine patch.

## 2023-01-02 NOTE — RT PROTOCOL NOTE
RT Inhaler-Nebulizer Bronchodilator Protocol Note    There is a bronchodilator order in the chart from a provider indicating to follow the RT Bronchodilator Protocol and there is an Initiate RT Inhaler-Nebulizer Bronchodilator Protocol order as well (see protocol at bottom of note). CXR Findings:  No results found. The findings from the last RT Protocol Assessment were as follows:   History Pulmonary Disease: Chronic pulmonary disease  Respiratory Pattern: Dyspnea on exertion or RR 21-25 bpm  Breath Sounds: Intermittent or unilateral wheezes  Cough: Strong, spontaneous, non-productive  Indication for Bronchodilator Therapy: Wheezing associated with pulm disorder  Bronchodilator Assessment Score: 8    Aerosolized bronchodilator medication orders have been revised according to the RT Inhaler-Nebulizer Bronchodilator Protocol below. Respiratory Therapist to perform RT Therapy Protocol Assessment initially then follow the protocol. Repeat RT Therapy Protocol Assessment PRN for score 0-3 or on second treatment, BID, and PRN for scores above 3. No Indications - adjust the frequency to every 6 hours PRN wheezing or bronchospasm, if no treatments needed after 48 hours then discontinue using Per Protocol order mode. If indication present, adjust the RT bronchodilator orders based on the Bronchodilator Assessment Score as indicated below. Use Inhaler orders unless patient has one or more of the following: on home nebulizer, not able to hold breath for 10 seconds, is not alert and oriented, cannot activate and use MDI correctly, or respiratory rate 25 breaths per minute or more, then use the equivalent nebulizer order(s) with same Frequency and PRN reasons based on the score. If a patient is on this medication at home then do not decrease Frequency below that used at home.     0-3 - enter or revise RT bronchodilator order(s) to equivalent RT Bronchodilator order with Frequency of every 4 hours PRN for wheezing or increased work of breathing using Per Protocol order mode. 4-6 - enter or revise RT Bronchodilator order(s) to two equivalent RT bronchodilator orders with one order with BID Frequency and one order with Frequency of every 4 hours PRN wheezing or increased work of breathing using Per Protocol order mode. 7-10 - enter or revise RT Bronchodilator order(s) to two equivalent RT bronchodilator orders with one order with TID Frequency and one order with Frequency of every 4 hours PRN wheezing or increased work of breathing using Per Protocol order mode. 11-13 - enter or revise RT Bronchodilator order(s) to one equivalent RT bronchodilator order with QID Frequency and an Albuterol order with Frequency of every 4 hours PRN wheezing or increased work of breathing using Per Protocol order mode. Greater than 13 - enter or revise RT Bronchodilator order(s) to one equivalent RT bronchodilator order with every 4 hours Frequency and an Albuterol order with Frequency of every 2 hours PRN wheezing or increased work of breathing using Per Protocol order mode. RT to enter RT Home Evaluation for COPD & MDI Assessment order using Per Protocol order mode.     Electronically signed by Tramaine Jaimes RCP on 1/2/2023 at 12:28 PM

## 2023-01-03 LAB
ANION GAP SERPL CALCULATED.3IONS-SCNC: 13 MEQ/L (ref 8–16)
BASOPHILS # BLD: 0.2 %
BASOPHILS ABSOLUTE: 0 THOU/MM3 (ref 0–0.1)
BUN BLDV-MCNC: 21 MG/DL (ref 7–22)
CALCIUM SERPL-MCNC: 9 MG/DL (ref 8.5–10.5)
CHLORIDE BLD-SCNC: 95 MEQ/L (ref 98–111)
CO2: 27 MEQ/L (ref 23–33)
CREAT SERPL-MCNC: 0.8 MG/DL (ref 0.4–1.2)
EKG ATRIAL RATE: 62 BPM
EKG P AXIS: 59 DEGREES
EKG P-R INTERVAL: 176 MS
EKG Q-T INTERVAL: 424 MS
EKG QRS DURATION: 72 MS
EKG QTC CALCULATION (BAZETT): 430 MS
EKG R AXIS: 5 DEGREES
EKG T AXIS: 42 DEGREES
EKG VENTRICULAR RATE: 62 BPM
EOSINOPHIL # BLD: 0 %
EOSINOPHILS ABSOLUTE: 0 THOU/MM3 (ref 0–0.4)
ERYTHROCYTE [DISTWIDTH] IN BLOOD BY AUTOMATED COUNT: 14.7 % (ref 11.5–14.5)
ERYTHROCYTE [DISTWIDTH] IN BLOOD BY AUTOMATED COUNT: 45.1 FL (ref 35–45)
GFR SERPL CREATININE-BSD FRML MDRD: > 60 ML/MIN/1.73M2
GLUCOSE BLD-MCNC: 150 MG/DL (ref 70–108)
HCT VFR BLD CALC: 43.9 % (ref 37–47)
HEMOGLOBIN: 13.9 GM/DL (ref 12–16)
IMMATURE GRANS (ABS): 0.67 THOU/MM3 (ref 0–0.07)
IMMATURE GRANULOCYTES: 5.7 %
LYMPHOCYTES # BLD: 3.8 %
LYMPHOCYTES ABSOLUTE: 0.4 THOU/MM3 (ref 1–4.8)
MCH RBC QN AUTO: 26.9 PG (ref 26–33)
MCHC RBC AUTO-ENTMCNC: 31.7 GM/DL (ref 32.2–35.5)
MCV RBC AUTO: 84.9 FL (ref 81–99)
MONOCYTES # BLD: 4.5 %
MONOCYTES ABSOLUTE: 0.5 THOU/MM3 (ref 0.4–1.3)
NUCLEATED RED BLOOD CELLS: 0 /100 WBC
PLATELET # BLD: 381 THOU/MM3 (ref 130–400)
PMV BLD AUTO: 9 FL (ref 9.4–12.4)
POTASSIUM SERPL-SCNC: 5.5 MEQ/L (ref 3.5–5.2)
RBC # BLD: 5.17 MILL/MM3 (ref 4.2–5.4)
SCAN OF BLOOD SMEAR: NORMAL
SEG NEUTROPHILS: 85.8 %
SEGMENTED NEUTROPHILS ABSOLUTE COUNT: 10 THOU/MM3 (ref 1.8–7.7)
SODIUM BLD-SCNC: 135 MEQ/L (ref 135–145)
WBC # BLD: 11.7 THOU/MM3 (ref 4.8–10.8)

## 2023-01-03 PROCEDURE — 97535 SELF CARE MNGMENT TRAINING: CPT

## 2023-01-03 PROCEDURE — 85025 COMPLETE CBC W/AUTO DIFF WBC: CPT

## 2023-01-03 PROCEDURE — 94669 MECHANICAL CHEST WALL OSCILL: CPT

## 2023-01-03 PROCEDURE — 97110 THERAPEUTIC EXERCISES: CPT

## 2023-01-03 PROCEDURE — 6360000002 HC RX W HCPCS: Performed by: STUDENT IN AN ORGANIZED HEALTH CARE EDUCATION/TRAINING PROGRAM

## 2023-01-03 PROCEDURE — 2700000000 HC OXYGEN THERAPY PER DAY

## 2023-01-03 PROCEDURE — 2580000003 HC RX 258: Performed by: STUDENT IN AN ORGANIZED HEALTH CARE EDUCATION/TRAINING PROGRAM

## 2023-01-03 PROCEDURE — 6370000000 HC RX 637 (ALT 250 FOR IP): Performed by: STUDENT IN AN ORGANIZED HEALTH CARE EDUCATION/TRAINING PROGRAM

## 2023-01-03 PROCEDURE — 6370000000 HC RX 637 (ALT 250 FOR IP): Performed by: INTERNAL MEDICINE

## 2023-01-03 PROCEDURE — 6360000002 HC RX W HCPCS: Performed by: PHARMACIST

## 2023-01-03 PROCEDURE — 93306 TTE W/DOPPLER COMPLETE: CPT

## 2023-01-03 PROCEDURE — 94760 N-INVAS EAR/PLS OXIMETRY 1: CPT

## 2023-01-03 PROCEDURE — 36415 COLL VENOUS BLD VENIPUNCTURE: CPT

## 2023-01-03 PROCEDURE — 94640 AIRWAY INHALATION TREATMENT: CPT

## 2023-01-03 PROCEDURE — 80048 BASIC METABOLIC PNL TOTAL CA: CPT

## 2023-01-03 PROCEDURE — 93005 ELECTROCARDIOGRAM TRACING: CPT | Performed by: STUDENT IN AN ORGANIZED HEALTH CARE EDUCATION/TRAINING PROGRAM

## 2023-01-03 PROCEDURE — 97530 THERAPEUTIC ACTIVITIES: CPT

## 2023-01-03 PROCEDURE — 97116 GAIT TRAINING THERAPY: CPT

## 2023-01-03 PROCEDURE — 2060000000 HC ICU INTERMEDIATE R&B

## 2023-01-03 RX ORDER — PREDNISONE 20 MG/1
40 TABLET ORAL DAILY
Status: DISCONTINUED | OUTPATIENT
Start: 2023-01-04 | End: 2023-01-04 | Stop reason: HOSPADM

## 2023-01-03 RX ORDER — IPRATROPIUM BROMIDE AND ALBUTEROL SULFATE 2.5; .5 MG/3ML; MG/3ML
1 SOLUTION RESPIRATORY (INHALATION) 3 TIMES DAILY
Status: DISCONTINUED | OUTPATIENT
Start: 2023-01-03 | End: 2023-01-04 | Stop reason: HOSPADM

## 2023-01-03 RX ADMIN — CEFTRIAXONE SODIUM 1000 MG: 1 INJECTION, POWDER, FOR SOLUTION INTRAMUSCULAR; INTRAVENOUS at 12:20

## 2023-01-03 RX ADMIN — GUAIFENESIN 600 MG: 600 TABLET, EXTENDED RELEASE ORAL at 08:05

## 2023-01-03 RX ADMIN — SODIUM CHLORIDE, PRESERVATIVE FREE 10 ML: 5 INJECTION INTRAVENOUS at 19:55

## 2023-01-03 RX ADMIN — PAROXETINE HYDROCHLORIDE 20 MG: 20 TABLET, FILM COATED ORAL at 08:05

## 2023-01-03 RX ADMIN — ENOXAPARIN SODIUM 30 MG: 100 INJECTION SUBCUTANEOUS at 19:57

## 2023-01-03 RX ADMIN — ENOXAPARIN SODIUM 30 MG: 100 INJECTION SUBCUTANEOUS at 08:04

## 2023-01-03 RX ADMIN — GUAIFENESIN 600 MG: 600 TABLET, EXTENDED RELEASE ORAL at 19:54

## 2023-01-03 RX ADMIN — IPRATROPIUM BROMIDE AND ALBUTEROL SULFATE 1 AMPULE: .5; 3 SOLUTION RESPIRATORY (INHALATION) at 19:35

## 2023-01-03 RX ADMIN — QUETIAPINE FUMARATE 600 MG: 400 TABLET ORAL at 22:47

## 2023-01-03 RX ADMIN — IPRATROPIUM BROMIDE AND ALBUTEROL SULFATE 1 AMPULE: .5; 3 SOLUTION RESPIRATORY (INHALATION) at 09:24

## 2023-01-03 RX ADMIN — METOPROLOL TARTRATE 12.5 MG: 25 TABLET, FILM COATED ORAL at 08:05

## 2023-01-03 RX ADMIN — SODIUM CHLORIDE, PRESERVATIVE FREE 10 ML: 5 INJECTION INTRAVENOUS at 08:06

## 2023-01-03 RX ADMIN — METHYLPREDNISOLONE SODIUM SUCCINATE 40 MG: 40 INJECTION, POWDER, FOR SOLUTION INTRAMUSCULAR; INTRAVENOUS at 01:21

## 2023-01-03 RX ADMIN — METOPROLOL TARTRATE 12.5 MG: 25 TABLET, FILM COATED ORAL at 19:54

## 2023-01-03 ASSESSMENT — PAIN SCALES - GENERAL
PAINLEVEL_OUTOF10: 0

## 2023-01-03 NOTE — PROGRESS NOTES
[facility] ICU STEPDOWN TELEMETRY 4K  Occupational Therapy  Daily Note  Time:   Time In: 3992  Time Out: 5096  Timed Code Treatment Minutes: 30 Minutes  Minutes: 30          Date: 1/3/2023  Patient Name: Crystal Raman,   Gender: female      Room: [facility]/018-A  MRN: [id number]  : 1955  (79 y.o.)  Referring Practitioner: Marko Romo DO  Diagnosis: Hypoxia  Additional Pertinent Hx: Crystal Raman is a 79 y.o. female with PMHx of hypertension, multiple cirrhosis, hypothyroidism, GERD, HLD, schizoaffective disorder, tobacco use disorder and CKD 3 who presented to [facility] with complaint of shortness of breath but was not entirely clear about her symptoms of concern. Patient is visibly disheveled appearing unbathed and not well kept. Patient reports that she believes she may have the flu but denies vaccination. She reports using home oxygen at 3 L/min via nasal cannula and requiring additional oxygen support from her home pulse oximetry. She has associated symptoms of chills but was uncertain about fever. The remainder of the exam was inhibited by the patient's mental status as she was lethargic but easily rousable while intermittently falling asleep. Within the emergency department the patient was unable to maintain appropriate oxygen saturations and required escalating supplemental oxygen via nasal cannula which was subsequently escalated to high flow nasal cannula. She was administered 1 dose of ceftriaxone and doxycycline as well as 1 DuoNeb treatment. Restrictions/Precautions:  Restrictions/Precautions: General Precautions, Fall Risk  Position Activity Restriction  Other position/activity restrictions: 6 L NC     SUBJECTIVE: Pt seated in bedside chair upon arrival, agreeable to OT session. PAIN: Denies. Vitals: Oxygen: Pt on 6L O2 via NC- pt >90% session.   Heart Rate: WNL    COGNITION: Slow Processing, Decreased Recall, Decreased Insight, Impaired Memory, Decreased Problem Solving, Decreased Safety Awareness, Impaired Attention, and pt demo's very poor problem solving during functional tasks. RN stating pt was unable to verbalize the year this date. ADL:   Lower Extremity Dressing: Maximum Assistance, X 1, with verbal cues , and with increased time for completion. Utilizing [facility]- pt demo's poor return demo with poor problem solving and sequencing requiring max repeated cues for proper use . BALANCE:  Sitting Balance:  Modified Independent. Bedside chair. Standing Balance:  Not tested . Pt declined    BED MOBILITY:  Not Tested    TRANSFERS & FUNCTIONAL MOBILITY:  Pt declined- \"I'm fine! \"    ADDITIONAL ACTIVITIES:  Pt issued energy conservation handout with lengthy verbal education provided on ways to adapt within home environment in prep for d/c. Pt able to verbally problem solve various scenarios and appropriate strategies for ADL and IADL tasks with max v/c and demos fair understanding- question carryover to subsequent session. Completed to increase ease during functional tasks. ASSESSMENT:     Activity Tolerance:  Patient tolerance of  treatment: fair. Discharge Recommendations: Subacute/skilled nursing facility, Patient would benefit from continued OT at discharge, and notified RN of recommended SLP consult. Equipment Recommendations:  Other: monitor need for [facility], shower chair  Plan: Times Per Week: 5x  Current Treatment Recommendations: Strengthening, Balance training, Functional mobility training, Endurance training, Safety education & training, Patient/Caregiver education & training, Self-Care / ADL, Return to work related activity    Patient Education  Patient Education: ADL's, Energy Conservation, Equipment Education, Importance of Increasing Activity, and Home Safety Education    Goals  Short Term Goals  Time Frame for Short Term Goals: by discharge  Short Term Goal 1: Pt will safely navigate to/from bathroom with LRAE and SBA for increased indep with ADLs  Short Term Goal 2: Pt will tolerate dynamic standing x5-6min with SBA and B hand release to increase indep with grooming  Short Term Goal 3: Pt will complete BADL routine with Min A and min VC for ECT to increase indep with self care  Short Term Goal 4: Pt will verbalize >/= 3 ECT for use during ADL routine to increase activity tolerance during self care routine    Following session, patient left in safe position with all fall risk precautions in place.

## 2023-01-03 NOTE — PROGRESS NOTES
[facility]      Patient - Zainab Masterson   MRN -  [id number]   [facility]   - 1955      Date of Admission -  2022 11:11 AM  Date of evaluation -  1/3/2023  Room - -018-   Hospital Day - [facility] Primary Care Physician - BRIGETTE Soares - SCOTT     Problem List      Active Hospital Problems    Diagnosis Date Noted    Pneumonia due to infectious organism [J18.9] 2023     Priority: Medium    Tobacco smoke exposure [Z77.22] 2023     Priority: Medium    COPD exacerbation ([address] [J44.1] 2022     Priority: Medium     Reason for Consult    Increasing hypoxia  HPI   History Obtained From: Patient  and electronic medical record. Zainab Masterson is a pleasant 79 y.o. female with PMHx HTN, MS, hypothyroid, GERD, HLD, schizoaffective disorder, tobacco use disorder, and CKD 3 who presented to [facility] ED with CC: Shortness of breath on 2022. Patient stated that she thought she had the flu. She says she had not been vaccinated for COVID or the flu. She stated she started feeling short of breath at home but did not have a pulse ox to take her O2 stats. She stated she was on 3 L O2. In ED she was satting at 89% on 3 L. She endorsed chills and possible fevers. She stated that she seems to be using more of her regular inhalers at home than normal.  Patient had no history of PE, was not using blood thinners, and not ambulating much over past few days before coming in. Patient denied chest pain but said she had been coughing up blood-tinged sputum. She denied any history of TB. Denied history of CHF. But states she has been using more pillows at night and endorsed some mild weight gain. Denied hormone use, cancer, abdominal pain, diarrhea or constipation. On presentation patient was visibly disheveled, appeared unbathed and not well kept.   Patient was lethargic but easily arousable when she intermittently fell asleep. Patient was unable to maintain appropriate oxygen saturation in ED and subsequently escalated to HFNC. She also received 1 dose of ceftriaxone and doxycycline in ED. By 12/31 patient had been weaned down to 3 L through most of the day. However, in the last 2 days she has needed increased levels of oxygen is currently on 6 L NC. Feels better today. Got medicine at home and dog and plenty of visitors. She is having shortness of breath: Yes  Onset: improving   Duration:5minutes. Diurnal variation:  None. Worse  Happens intermittently   block/s on level ground. She can climb steps: No  Can't walk on right leg- broken  Flights of steps she can climb: 1  She admits to orthopnea. She admits to paroxysmal nocturnal dyspnea. She is having cough: Yes  Duration of cough: for 7 days. Her cough is associated with sputum production: Yes   The sputum color: yellow  Hemoptysis:Yes  Diurnal variation: None. Worse in the morning  Relieving factors: No  Aggravating factors: No      She is having chest pain:No  Scale:denies/10  Relation ship to breathing: no pain with inspiration.       Past 24 hrs   -On 6 LPM via NC   -Reports she feels wheezing is improving   -OOB to chair reports cough improing  All other systems reviewed    PMHx   Past Medical History      Diagnosis Date    Bipolar disorder ([address]     Cerebral artery occlusion with cerebral infarction (HCC)     Chronic back pain     Colon polyps     COPD (chronic obstructive pulmonary disease) (HCC)     Depression     Dermatitis seborrheica     DJD (degenerative joint disease)     Heart problem     Hyperlipidemia     Multiple sclerosis ([address] dx 2003    Relapse/Remitting type    Need for other prophylactic chemotherapy(V07.39)     Obesity     Osteoarthritis     Back    Osteopenia     Panic disorder without agoraphobia     Personal history of fall     Schizoaffective disorder ([address]     Secondary parkinsonism ([address]     Seizures ([address] Sinusitis     Tension headache, chronic     Weight loss       Past Surgical History        Procedure Laterality Date    APPENDECTOMY      CARDIOVASCULAR STRESS TEST  05/2011    COLONOSCOPY      7/2014,2022    EGD  2022    Dr Katerina Alanis (CERVIX STATUS UNKNOWN)      MAMMO IMPLANT DIGITAL DIAG BI  06/2011    repeat in 12/2011    SHOULDER SURGERY      LEONILA AND BSO (CERVIX REMOVED)       Meds    Current Medications    nicotine  1 patch TransDERmal Daily    cefTRIAXone (ROCEPHIN) IV  1,000 mg IntraVENous Q24H    methylPREDNISolone  40 mg IntraVENous Q12H    guaiFENesin  600 mg Oral BID    ipratropium-albuterol  1 ampule Inhalation Q4H WA    QUEtiapine  600 mg Oral QHS    PARoxetine  20 mg Oral QAM    metoprolol tartrate  12.5 mg Oral BID    sodium chloride flush  5-40 mL IntraVENous 2 times per day    enoxaparin  30 mg SubCUTAneous Q12H     sodium chloride flush, sodium chloride, ondansetron **OR** ondansetron, polyethylene glycol, acetaminophen **OR** acetaminophen, ipratropium-albuterol  IV Drips/Infusions   sodium chloride       Home Medications  Medications Prior to Admission: dalfampridine (AMPYRA) 10 MG extended release tablet, Take 10 mg by mouth in the morning and 10 mg in the evening.   ARIPiprazole er (ABILIFY MAINTENA) 300 MG PRSY prefilled syringe, Inject 300 mg into the muscle every 30 days  QUEtiapine (SEROQUEL) 400 MG tablet, Take 600 mg by mouth nightly  traZODone (DESYREL) 100 MG tablet, Take 100-200 mg by mouth nightly as needed for Sleep  pantoprazole (PROTONIX) 20 MG tablet, Take 1 tablet by mouth every morning (before breakfast)  budesonide-formoterol (SYMBICORT) 160-4.5 MCG/ACT AERO, Inhale 2 puffs into the lungs 2 times daily  PARoxetine (PAXIL) 20 MG tablet, Take 20 mg by mouth every morning  OXYGEN, Inhale 3 L into the lungs continuous  calcium-vitamin D (OSCAL-500) 500-200 MG-UNIT per tablet, Take 1 tablet by mouth 2 times daily  meloxicam (MOBIC) 15 MG tablet, Take 15 mg by mouth daily  hydrOXYzine (ATARAX) 25 MG tablet, Take 25 mg by mouth every 8 hours as needed for Anxiety  famotidine (PEPCID) 20 MG tablet, Take 20 mg by mouth 2 times daily  atorvastatin (LIPITOR) 40 MG tablet, Take 40 mg by mouth daily  aspirin 81 MG EC tablet, Take 81 mg by mouth daily  albuterol sulfate HFA (PROVENTIL;VENTOLIN;PROAIR) 108 (90 Base) MCG/ACT inhaler, Inhale 1 puff into the lungs every 6 hours as needed for Wheezing  [DISCONTINUED] Diroximel Fumarate 231 MG CPDR, Take 2 caplets by mouth in the morning and at bedtime  [DISCONTINUED] nystatin (MYCOSTATIN) 688029 UNIT/GM powder, Apply topically 3 times daily Apply topically 3 times daily. [DISCONTINUED] traZODone (DESYREL) 50 MG tablet, Take 1 tablet by mouth nightly as needed for Sleep (Patient taking differently: No sig reported)  [DISCONTINUED] QUEtiapine (SEROQUEL) 100 MG tablet, Take 5 tablets by mouth nightly (Patient taking differently: Take 200 mg by mouth 2 times daily)  metoprolol tartrate (LOPRESSOR) 25 MG tablet, Take 0.5 tablets by mouth 2 times daily  levothyroxine (SYNTHROID) 50 MCG tablet, Take 1 tablet by mouth Daily  Diet    ADULT DIET;  Regular; Low Fat/Low Chol/High Fiber/KENNY  Allergies    Adhesive tape  Social History     Social History     Socioeconomic History    Marital status:      Spouse name: Not on file    Number of children: 0    Years of education: 11    Highest education level: Not on file   Occupational History    Occupation: disability   Tobacco Use    Smoking status: Every Day     Packs/day: 0.50     Years: 20.00     Pack years: 10.00     Types: Cigarettes    Smokeless tobacco: Never   Vaping Use    Vaping Use: Never used   Substance and Sexual Activity    Alcohol use: Not Currently     Comment: occasionally    Drug use: Not Currently     Types: Marijuana Sandi Andrade     Comment: last use yesterday    Sexual activity: Never   Other Topics Concern    Not on file   Social History Narrative    Not on file     Social Determinants of Health     Financial Resource Strain: Not on file   Food Insecurity: Not on file   Transportation Needs: Not on file   Physical Activity: Not on file   Stress: Not on file   Social Connections: Not on file   Intimate Partner Violence: Not on file   Housing Stability: Not on file     Family History          Problem Relation Age of Onset    Other Mother         back    Diabetes Mother     Cancer Mother     Diabetes Father     Cancer Brother     Cancer Brother      Sleep History    Reportedly diagnosed with sleep apnea in the past at [facility] previously evaluated by Dr. Dutch James     height is 5' 6\" (1.676 m) and weight is 220 lb 8 oz (100 kg). Her oral temperature is 98 °F (36.7 °C). Her blood pressure is 142/75 (abnormal) and her pulse is 76. Her respiration is 16 and oxygen saturation is 92%. Body mass index is 35.59 kg/m². SUPPLEMENTAL O2: O2 Flow Rate (L/min): 6 L/min     I/O      Intake/Output Summary (Last 24 hours) at 1/3/2023 0948  Last data filed at 1/2/2023 2245  Gross per 24 hour   Intake 46.26 ml   Output --   Net 46.26 ml       I/O last 3 completed shifts: In: 546.3 [P.O.:500; IV Piggyback:46.3]  Out: -    Patient Vitals for the past 96 hrs (Last 3 readings):   Weight   01/01/23 0312 220 lb 8 oz (100 kg)   12/31/22 0314 221 lb 11.2 oz (100.6 kg)         Exam   Physical Exam   Constitutional: No distress on O2 per NC. Patient appears obese BMI 35  Head: Normocephalic and atraumatic. Mouth/Throat: Oropharynx is clear and moist.  No oral thrush. Eyes: Conjunctivae are normal. Pupils are equal, round. No scleral icterus. Neck: Neck supple. No tracheal deviation present. Cardiovascular: S1 and S2 with no murmur. No peripheral edema  Pulmonary/Chest: Normal effort with bilateral air entry, noted expiratory wheezing L>R posteriorly No stridor. No respiratory distress. Patient exhibits no tenderness. Abdominal: Soft. Bowel sounds audible. No distension or tenderness to palp. Musculoskeletal: Moves all extremities  Neurological: Patient is alert and follows simple commands     Labs  - Old records and notes have been reviewed in [facility]   ABG  Lab Results   Component Value Date/Time    PH 7.40 08/31/2017 08:31 AM    PO2 75 08/31/2017 08:31 AM    PCO2 39 08/31/2017 08:31 AM    HCO3 24 08/31/2017 08:31 AM    O2SAT 95 08/31/2017 08:31 AM     Lab Results   Component Value Date/Time    IFIO2 2 08/31/2017 08:31 AM     CBC  Recent Labs     01/01/23  0835   WBC 7.0   RBC 4.92   HGB 13.2   HCT 41.9   MCV 85.2   MCH 26.8   MCHC 31.5*      MPV 8.9*        BMP  Recent Labs     01/01/23  0835      K 4.1   CL 99   CO2 31   BUN 18   CREATININE 0.7   GLUCOSE 88   CALCIUM 8.6       LFT  No results for input(s): AST, ALT, ALB, BILITOT, ALKPHOS, LIPASE in the last 72 hours. Invalid input(s): AMYLASE  TROP  Lab Results   Component Value Date/Time    TROPONINT < 0.010 12/27/2022 11:29 AM    TROPONINT < 0.010 10/10/2018 04:03 PM    TROPONINT < 0.010 10/06/2018 05:06 PM     BNP  No results for input(s): BNP in the last 72 hours. Lactic Acid  No results for input(s): LACTA in the last 72 hours. INR  No results for input(s): INR, PROTIME in the last 72 hours. PTT  No results for input(s): APTT in the last 72 hours. Glucose  No results for input(s): POCGLU in the last 72 hours. UA No results for input(s): SPECGRAV, PHUR, COLORU, CLARITYU, MUCUS, PROTEINU, BLOODU, RBCUA, WBCUA, BACTERIA, NITRU, GLUCOSEU, BILIRUBINUR, UROBILINOGEN, KETUA, LABCAST, LABCASTTY, AMORPHOS in the last 72 hours. Invalid input(s): CRYSTALS. PFTs       Cultures    Bcx2 NGTD,   Aerobic NGTD,   PNA panel PCR no sample obtained  Urine + for Candida,  MRSA/VRE Neg,   Covid/Flu negative    EKG     Echocardiogram     N/A    Ordered pending completion    Radiology    CXR    XR CHEST (2 VW)     1/2/2023     FINDINGS:   The heart size is normal.  The mediastinum is not widened.   There are no pulmonary infiltrates or effusions. There is persistent mild infiltrate in the left lower lobe. There are no pleural effusions. The right lung is relatively well aerated. Impression:  Persistent, but improved infiltrate in the left lower lobe. CT Scans  (See actual reports for details)    CTA CHEST W WO CONTRAST     12/27/2022     FINDINGS:  There is adequate opacification of the pulmonary arterial system. No pulmonary emboli are present. The aorta is within acceptable limits. The heart size is normal. There is no pericardial or pleural effusion. Multiple nonenlarged mediastinal lymph nodes similar to the prior exam. Calcified subcarinal nodes. Evaluation of the lungs is limited by motion and streak artifact. There are tree-in-bud opacities in the lateral right upper lobe. There are bilateral lower lobe airspace infiltrates. There is no associated effusion. No suspicious osseous lesions are present. There are no suspicious findings in the imaged aspects of the upper abdomen. Impression:  1. No pulmonary embolism   2. Bilateral lower lobe airspace infiltrates   3. Scattered tree-in-bud opacities in the right upper lobe consistent with infectious or inflammatory etiology. Assessment   -Acute on chronic respiratory failure with hypoxia: Baseline 3LNC. 2/2 below COPD exacerbation and pneumonia. Possible component of CHF, patient reports 3-4 pillow orthopnea. No echo on file patient currently on 6 L.  -Moderate COPD:  in acute exacerbation. PFT in 2017 shows combination restrictive and obstructive defect  -CAP- -CT: Bilat LL airspace infiltrates, scattered tree-in-bud opacities RUL consistent with infectious or inflammatory etiology.  -Patient on Rocephin Day 6/7 and completed Zithromax 5/5.  -MELI on CPAP: at home  -Hx of tobacco abuse & current smoker: Patient has a 25-year smoking history. Less than 1 pack a day. Currently smokes 2 to 3 cigarettes a day.     Plan   -Monitor SpO2 wean supplemental O2 to maintain SpO2 >90%  -Follow pending ECHO, elevated ProBNP level  -methylprednisolone 40 mg twice daily, transition to PO prednisone tomorrow  -Guaifenesin 600 mg PO BID for thick secretions   -CPT Vest therapy Q shift  -agree with ATB's coverage for CAP as primary is doing azithromycin  mg Daily 5/5 completed Rocephin 6/7 completed   -Pt to bring in home CPAP. Until then will use hospital CPAP 12 mmH2O at night  -Orders placed to obtain sleep study records from Dr. Dutch Billingsley at [address] [place] [address] need Home O2 eval at discharge  -Tobacco cessation discussed currently on NRT patches  -DVT prophylaxis with lovenox    Questions and concerns addressed. Electronically signed by   BRIGETTE Stiles CNP on 1/3/2023 at 9:48 AM     Addendum by Dr. Angie Love MD:  Patient seen by me independently including key components of medical care. Face to face evaluation and examination was performed. Case discussed with Mr. Immanuel Noel CNP  Italicized font, if present,  represents changes to the note made by me. More than 50% of the encounter time involved with patient care by the Pulmonary & Critical care service team spent by me . Please see my modifications mentioned below:  She is clinically improving slowly  Wean FiO2 keep saturation more than 90%  Patient to use her CPAP device with a pressure of 12 cm of water at nighttime.   Home O2 evaluation at the time of discharge    Electronically signed by   Clement Stephens MD on 1/3/2023 at 7:07 PM

## 2023-01-03 NOTE — FLOWSHEET NOTE
01/03/23 1108   Safe Environment   Safety Measures Other (comment)  (Virtual nurse round complete)   No response to audio. Camera turned on to check patient safety. Patient sitting up in chair, awake. No signs of distress noted. Call light in hand.

## 2023-01-03 NOTE — PLAN OF CARE
Problem: Respiratory - Adult  Goal: Achieves optimal ventilation and oxygenation  1/3/2023 1109 by Lu Sylvester RN  Outcome: Progressing  Flowsheets (Taken 1/3/2023 0800)  Achieves optimal ventilation and oxygenation:   Assess for changes in respiratory status   Assess for changes in mentation and behavior   Position to facilitate oxygenation and minimize respiratory effort   Oxygen supplementation based on oxygen saturation or arterial blood gases  1/2/2023 2246 by Nilay Garcia RN  Outcome: Progressing  Goal: Clear lung sounds  1/3/2023 1109 by Lu Sylvester RN  Outcome: Progressing  1/3/2023 0950 by Velia Sebastian RCP  Outcome: Progressing  1/3/2023 0937 by Velia Sebastian RCP  Outcome: Progressing  1/2/2023 2246 by Nilay Garcia RN  Outcome: Progressing     Problem: Discharge Planning  Goal: Discharge to home or other facility with appropriate resources  1/3/2023 1109 by Lu Sylvester RN  Outcome: Progressing  1/2/2023 2246 by Nilay Garcia RN  Outcome: Progressing     Problem: Skin/Tissue Integrity  Goal: Absence of new skin breakdown  Description: 1. Monitor for areas of redness and/or skin breakdown  2. Assess vascular access sites hourly  3. Every 4-6 hours minimum:  Change oxygen saturation probe site  4. Every 4-6 hours:  If on nasal continuous positive airway pressure, respiratory therapy assess nares and determine need for appliance change or resting period.   1/3/2023 1109 by Lu Sylvester RN  Outcome: Progressing  1/2/2023 2246 by Nilay Garcia RN  Outcome: Progressing     Problem: ABCDS Injury Assessment  Goal: Absence of physical injury  1/3/2023 1109 by Lu Sylvester RN  Outcome: Progressing  1/2/2023 2246 by Nilay Garcia RN  Outcome: Progressing     Problem: Safety - Adult  Goal: Free from fall injury  1/3/2023 1109 by Lu Sylvester RN  Outcome: Progressing  1/2/2023 2246 by Nilay Garcia RN  Outcome: Progressing     Problem: Pain  Goal: Verbalizes/displays adequate comfort level or baseline comfort level  1/3/2023 1109 by Johnson Cervantes RN  Outcome: Progressing  Flowsheets (Taken 1/3/2023 0745)  Verbalizes/displays adequate comfort level or baseline comfort level:   Encourage patient to monitor pain and request assistance   Assess pain using appropriate pain scale   Administer analgesics based on type and severity of pain and evaluate response   Implement non-pharmacological measures as appropriate and evaluate response   Notify Licensed Independent Practitioner if interventions unsuccessful or patient reports new pain  1/2/2023 2246 by Shailesh Benites RN  Outcome: Progressing     Problem: Neurosensory - Adult  Goal: Achieves stable or improved neurological status  1/3/2023 1109 by Johnson Cervantes RN  Outcome: Progressing  Flowsheets (Taken 1/3/2023 0800)  Achieves stable or improved neurological status:   Assess for and report changes in neurological status   Maintain blood pressure and fluid volume within ordered parameters to optimize cerebral perfusion and minimize risk of hemorrhage   Monitor temperature, glucose, and sodium.  Initiate appropriate interventions as ordered  1/2/2023 2246 by Shailesh Benites RN  Outcome: Progressing     Problem: Infection - Adult  Goal: Absence of infection during hospitalization  1/3/2023 1109 by Johnson Cervantes RN  Outcome: Progressing  1/2/2023 2246 by Shailesh Benites RN  Outcome: Progressing     Problem: Chronic Conditions and Co-morbidities  Goal: Patient's chronic conditions and co-morbidity symptoms are monitored and maintained or improved  1/3/2023 1109 by Johnson Cervantes RN  Outcome: Progressing  1/2/2023 2246 by Shailesh Benites RN  Outcome: Progressing     Problem: Cardiovascular - Adult  Goal: Maintains optimal cardiac output and hemodynamic stability  1/3/2023 1109 by Johnson Cervantes RN  Outcome: Progressing  Flowsheets (Taken 1/3/2023 0800)  Maintains optimal cardiac output and hemodynamic stability:   Monitor blood pressure and heart rate   Monitor urine output and notify Licensed Independent Practitioner for values outside of normal range   Assess for signs of decreased cardiac output  1/2/2023 2246 by Cortes Gaona RN  Outcome: Progressing     Problem: Skin/Tissue Integrity - Adult  Goal: Skin integrity remains intact  1/3/2023 1109 by Morales Knott RN  Outcome: Progressing  1/2/2023 2246 by Cortes Gaona RN  Outcome: Progressing     Problem: Musculoskeletal - Adult  Goal: Return mobility to safest level of function  1/3/2023 1109 by Morales Knott RN  Outcome: Progressing  1/2/2023 2246 by Cortes Gaona RN  Outcome: Progressing     Problem: Gastrointestinal - Adult  Goal: Minimal or absence of nausea and vomiting  1/3/2023 1109 by Morales Knott RN  Outcome: Progressing  1/2/2023 2246 by Cortes Gaona RN  Outcome: Progressing     Problem: Genitourinary - Adult  Goal: Absence of urinary retention  1/3/2023 1109 by Morales Knott RN  Outcome: Progressing  1/2/2023 2246 by Cortes Gaona RN  Outcome: Progressing    Care plan reviewed with patient and family. Patient and family verbalize understanding of the plan of care and contribute to goal setting.

## 2023-01-03 NOTE — RT PROTOCOL NOTE
RT Inhaler-Nebulizer Bronchodilator Protocol Note    There is a bronchodilator order in the chart from a provider indicating to follow the RT Bronchodilator Protocol and there is an Initiate RT Inhaler-Nebulizer Bronchodilator Protocol order as well (see protocol at bottom of note). CXR Findings:  No results found. The findings from the last RT Protocol Assessment were as follows:   History Pulmonary Disease: Chronic pulmonary disease  Respiratory Pattern: Dyspnea on exertion or RR 21-25 bpm  Breath Sounds: Inspiratory and expiratory or bilateral wheezing and/or rhonchi  Cough: Strong, spontaneous, non-productive  Indication for Bronchodilator Therapy: Wheezing associated with pulm disorder  Bronchodilator Assessment Score: 10    Aerosolized bronchodilator medication orders have been revised according to the RT Inhaler-Nebulizer Bronchodilator Protocol below. Respiratory Therapist to perform RT Therapy Protocol Assessment initially then follow the protocol. Repeat RT Therapy Protocol Assessment PRN for score 0-3 or on second treatment, BID, and PRN for scores above 3. No Indications - adjust the frequency to every 6 hours PRN wheezing or bronchospasm, if no treatments needed after 48 hours then discontinue using Per Protocol order mode. If indication present, adjust the RT bronchodilator orders based on the Bronchodilator Assessment Score as indicated below. Use Inhaler orders unless patient has one or more of the following: on home nebulizer, not able to hold breath for 10 seconds, is not alert and oriented, cannot activate and use MDI correctly, or respiratory rate 25 breaths per minute or more, then use the equivalent nebulizer order(s) with same Frequency and PRN reasons based on the score. If a patient is on this medication at home then do not decrease Frequency below that used at home.     0-3 - enter or revise RT bronchodilator order(s) to equivalent RT Bronchodilator order with Frequency of every 4 hours PRN for wheezing or increased work of breathing using Per Protocol order mode. 4-6 - enter or revise RT Bronchodilator order(s) to two equivalent RT bronchodilator orders with one order with BID Frequency and one order with Frequency of every 4 hours PRN wheezing or increased work of breathing using Per Protocol order mode. 7-10 - enter or revise RT Bronchodilator order(s) to two equivalent RT bronchodilator orders with one order with TID Frequency and one order with Frequency of every 4 hours PRN wheezing or increased work of breathing using Per Protocol order mode. 11-13 - enter or revise RT Bronchodilator order(s) to one equivalent RT bronchodilator order with QID Frequency and an Albuterol order with Frequency of every 4 hours PRN wheezing or increased work of breathing using Per Protocol order mode. Greater than 13 - enter or revise RT Bronchodilator order(s) to one equivalent RT bronchodilator order with every 4 hours Frequency and an Albuterol order with Frequency of every 2 hours PRN wheezing or increased work of breathing using Per Protocol order mode. RT to enter RT Home Evaluation for COPD & MDI Assessment order using Per Protocol order mode.     Electronically signed by Mj Gates RCP on 1/3/2023 at 9:55 AM
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
## 2023-01-03 NOTE — PROGRESS NOTES
Hospitalist Progress Note      Patient:  Lucia Hudson    Unit/Bed:4K-18/018-A  YOB: 1955  MRN: [id number]   Acct: [de-identified]   PCP: BRIGETTE Downing Asa, CNP  Date of Admission: 12/27/2022    Assessment/Plan:    Sepsis, POA  CAP  Given new pulmonary infiltrates on CXR and CTA, will treat as CAP  Completed 5 days of azithromycin. Currently on rocephin. Will need 7-10 days. Sputum cultures/PNA PCR uncollected. and blood cultures ngtd  AECOPD  Likely due to #2  Continue symbicort. Completed 5 day sof systemic glycocorticoids. However on the day of ancitipated DC, the patient was noted to have persistent desaturations in the low 80's. She was resumed on systemic glucocorticoids with planned prolonged taper. She had minimal improvement overnight and was seen by pulmonology on 1/2. She is currently on BID solumedrol with improved aeration today. Duonebs TID and prn  Pulmonology following. Acute on chronic hypoxic respiratory failure  Due to #2 and 3  Treatment as above. ABRAN on CKD IIIa, resolved. Hold NSAIDs  Avoid nephrotoxic agents  Multiple sclerosis  On Vumerity, previously on Tecfidera  HTN  Lopressor held for hypotension on arrival.  Resumed on 12/30  Hypothyroidism  On levothyroxine  GERD  PPI held due to ABRAN. Ok to resume. HLD  On statin  Schizoaffective d/o with bipolar and anxiety  Ok to resume seroquel and paxil today. Holding trazodone and atarax. Disposition: Hopeful discharge in 1-2 days if hypoxia improves. May require prolonged steroid taper. Chief Complaint: SOB    Hospital Course:    Per H&P - Lucia Hudson is a 79 y.o. female with PMHx of hypertension, multiple cirrhosis, hypothyroidism, GERD, HLD, schizoaffective disorder, tobacco use disorder and CKD 3 who presented to [facility] with complaint of shortness of breath but was not entirely clear about her symptoms of concern. Patient is visibly disheveled appearing unbathed and not well kept. Patient reports that she believes she may have the flu but denies vaccination. She reports using home oxygen at 3 L/min via nasal cannula and requiring additional oxygen support from her home pulse oximetry. She has associated symptoms of chills but was uncertain about fever. The remainder of the exam was inhibited by the patient's mental status as she was lethargic but easily rousable while intermittently falling asleep. Within the emergency department the patient was unable to maintain appropriate oxygen saturations and required escalating supplemental oxygen via nasal cannula which was subsequently escalated to high flow nasal cannula. She was administered 1 dose of ceftriaxone and doxycycline as well as 1 DuoNeb treatment. 12/28: weaned to 6L NC but with desaturations overnight and placed back on HHFNC.      12/31: weaned to 3L NC and tolerated for much of the day    1/1: worsening hypoxia. Back on 6L NC. Started back on systemic glucocorticoids with plan for prolonged taper. 1/2: persistent hypoxia requiring 6L NC. Remains bronchospastic. Started back on IV solumedrol BID after pulmonology evaluation. Subjective (past 24 hours):   Breathing improved today. She is still on 6L in the low 90's but feels like she is having less wheezes. Asking when she will be able to go home. Past medical history, family history, social history and allergies reviewed again and is unchanged since admission. ROS (12 point review of systems completed. Pertinent positives noted.  Otherwise ROS is negative)     Medications:  Reviewed    Infusion Medications    sodium chloride       Scheduled Medications    nicotine  1 patch TransDERmal Daily    cefTRIAXone (ROCEPHIN) IV  1,000 mg IntraVENous Q24H    methylPREDNISolone  40 mg IntraVENous Q12H    guaiFENesin  600 mg Oral BID    ipratropium-albuterol  1 ampule Inhalation Q4H WA    QUEtiapine 600 mg Oral QHS    PARoxetine  20 mg Oral QAM    metoprolol tartrate  12.5 mg Oral BID    sodium chloride flush  5-40 mL IntraVENous 2 times per day    enoxaparin  30 mg SubCUTAneous Q12H     PRN Meds: sodium chloride flush, sodium chloride, ondansetron **OR** ondansetron, polyethylene glycol, acetaminophen **OR** acetaminophen, ipratropium-albuterol      Intake/Output Summary (Last 24 hours) at 1/3/2023 5150  Last data filed at 1/2/2023 2245  Gross per 24 hour   Intake 46.26 ml   Output --   Net 46.26 ml         Diet:  ADULT DIET; Regular; Low Fat/Low Chol/High Fiber/KENNY    Exam:  BP (!) 142/75   Pulse 76   Temp 98 °F (36.7 °C) (Oral)   Resp 16   Ht 5' 6\" (1.676 m)   Wt 220 lb 8 oz (100 kg)   SpO2 92%   BMI 35.59 kg/m²   General appearance: No apparent distress, appears stated age and cooperative. HEENT: Pupils equal, round, and reactive to light. Conjunctivae/corneas clear. Neck: Supple, with full range of motion. No jugular venous distention. Trachea midline. Respiratory:  Diminished breath sounds throughout all lung fields. Trace wheezes. No rhonchi. Mild scattered wheezes. Cardiovascular: Regular rate and rhythm with normal S1/S2 without murmurs, rubs or gallops. Abdomen: Soft, non-tender, non-distended with normal bowel sounds. Musculoskeletal: passive and active ROM x 4 extremities. Skin: Skin color, texture, turgor normal.  No rashes or lesions. Neurologic:  Neurovascularly intact without any focal sensory/motor deficits.  Cranial nerves: II-XII intact, grossly non-focal.  Psychiatric: Alert and oriented, thought content appropriate  Capillary Refill: Brisk,< 3 seconds   Peripheral Pulses: +2 palpable, equal bilaterally     Labs:   Recent Labs     01/01/23  0835   WBC 7.0   HGB 13.2   HCT 41.9          Recent Labs     01/01/23  0835      K 4.1   CL 99   CO2 31   BUN 18   CREATININE 0.7   CALCIUM 8.6       No results for input(s): AST, ALT, BILIDIR, BILITOT, ALKPHOS in the last 72 hours. No results for input(s): INR in the last 72 hours. No results for input(s): [place] in the last 72 hours. Microbiology:    Blood culture #1:   Lab Results   Component Value Date/Time    [facility]  12/27/2022 04:45 PM     No growth 24 hours. No growth 48 hours. No growth at 5 days       Blood culture #2:No results found for: BLOODCULT2    Organism:  Lab Results   Component Value Date/Time    ORG Jo glabrata 12/27/2022 05:20 PM         Lab Results   Component Value Date/Time    LABGRAM  02/15/2022 11:00 AM     No segmented neutrophils observed. Few epithelial cells observed. Many gram positive cocci occurring singly and in pairs. Many gram negative bacilli. Few gram positive bacilli. MRSA culture only:No results found for: [facility]    Urine culture:   Lab Results   Component Value Date/Time    LABURIN No growth-preliminary 12/27/2022 05:20 PM    LABURIN [place] count: 50,000-90,000 CFU/mL 12/27/2022 05:20 PM       Respiratory culture: No results found for: CULTRESP    Aerobic and Anaerobic :  Lab Results   Component Value Date/Time    LABAERO No Acinetobacter species isolated. 12/27/2022 08:30 PM    LABAERO No Acinetobacter species isolated 12/27/2022 08:30 PM     No results found for: LABANAE    Urinalysis:      Lab Results   Component Value Date/Time    NITRU NEGATIVE 12/27/2022 05:20 PM    WBCUA 0-2 12/27/2022 05:20 PM    BACTERIA FEW 12/27/2022 05:20 PM    RBCUA 0-2 12/27/2022 05:20 PM    BLOODU NEGATIVE 12/27/2022 05:20 PM    SPECGRAV >1.030 12/27/2022 05:20 PM    GLUCOSEU NEGATIVE 12/25/2018 08:00 PM       Radiology:  XR CHEST (2 VW)   Final Result   Persistent, but improved infiltrate in the left lower lobe. **This report has been created using voice recognition software. It may contain minor errors which are inherent in voice recognition technology. **      Final report electronically signed by Dr. Elena Edwards on 1/2/2023 11:39 AM      CTA [address]   Final Result       1. No pulmonary embolism   2. Bilateral lower lobe airspace infiltrates   3. Scattered tree-in-bud opacities in the right upper lobe consistent with infectious or inflammatory etiology. **This report has been created using voice recognition software. It may contain minor errors which are inherent in voice recognition technology. **      Final report electronically signed by Dr. Aylin Hurley on 12/27/2022 2:52 PM      XR CHEST (2 VW)   Final Result   1. Normal heart size. 2. Mild left basilar atelectasis/pneumonia. No effusion is seen. 3. Questionable additional patch of mild infiltrate left midlung laterally. **This report has been created using voice recognition software. It may contain minor errors which are inherent in voice recognition technology. **      Final report electronically signed by Dr. Kiarra Shepherd on 12/27/2022 11:48 AM        XR CHEST (2 VW)    Result Date: 12/27/2022  PROCEDURE: XR CHEST (2 VW) CLINICAL INFORMATION: sob COMPARISON: 10/10/2018 TECHNIQUE: PA and lateral views of the chest were obtained. 1. Normal heart size. 2. Mild left basilar atelectasis/pneumonia. No effusion is seen. 3. Questionable additional patch of mild infiltrate left midlung laterally. **This report has been created using voice recognition software. It may contain minor errors which are inherent in voice recognition technology. ** Final report electronically signed by Dr. Kiarra Shepherd on 12/27/2022 11:48 AM    CTA CHEST W WO CONTRAST    Result Date: 12/27/2022  PROCEDURE: CTA CHEST W WO CONTRAST CLINICAL INFORMATION: rule out pe. Short of breath COMPARISON: 5/25/2011 TECHNIQUE: 3 mm axial images were obtained through the chest after the administration of IV contrast.  A non-contrast localizer was obtained. 3D reconstructions were performed on the scanner to include MIP coronal and sagittal images through the chest. Isovue was the intravenous contrast utilized.  All CT scans at this facility use dose modulation, iterative reconstruction, and/or weight-based dosing when appropriate to reduce radiation dose to as low as reasonably achievable. FINDINGS: There is adequate opacification of the pulmonary arterial system. No pulmonary emboli are present. The aorta is within acceptable limits. The heart size is normal. There is no pericardial or pleural effusion. Multiple nonenlarged mediastinal lymph nodes similar to the prior exam. Calcified subcarinal nodes. Evaluation of the lungs is limited by motion and streak artifact. There are tree-in-bud opacities in the lateral right upper lobe. There are bilateral lower lobe airspace infiltrates. There is no associated effusion. No suspicious osseous lesions are present. There are no suspicious findings in the imaged aspects of the upper abdomen. 1. No pulmonary embolism 2. Bilateral lower lobe airspace infiltrates 3. Scattered tree-in-bud opacities in the right upper lobe consistent with infectious or inflammatory etiology. **This report has been created using voice recognition software. It may contain minor errors which are inherent in voice recognition technology. ** Final report electronically signed by Dr. Apple Rubio on 12/27/2022 2:52 PM      Electronically signed by Rafael Tejada DO on 1/3/2023 at 9:25 AM

## 2023-01-03 NOTE — PROGRESS NOTES
Virtual nurse completed remaining admission questions with pt at this time. Pt resting in bed with call light in reach. No voiced questions or concerns.

## 2023-01-03 NOTE — RT PROTOCOL NOTE
RT Inhaler-Nebulizer Bronchodilator Protocol Note    There is a bronchodilator order in the chart from a provider indicating to follow the RT Bronchodilator Protocol and there is an Initiate RT Inhaler-Nebulizer Bronchodilator Protocol order as well (see protocol at bottom of note). CXR Findings:  No results found. The findings from the last RT Protocol Assessment were as follows:   History Pulmonary Disease: Chronic pulmonary disease  Respiratory Pattern: Mild dyspnea at rest, irregular pattern, or RR 21-25 bpm  Breath Sounds: Inspiratory and expiratory or bilateral wheezing and/or rhonchi  Cough: Strong, spontaneous, non-productive  Indication for Bronchodilator Therapy: Wheezing associated with pulm disorder  Bronchodilator Assessment Score: 12    Aerosolized bronchodilator medication orders have been revised according to the RT Inhaler-Nebulizer Bronchodilator Protocol below. Respiratory Therapist to perform RT Therapy Protocol Assessment initially then follow the protocol. Repeat RT Therapy Protocol Assessment PRN for score 0-3 or on second treatment, BID, and PRN for scores above 3. No Indications - adjust the frequency to every 6 hours PRN wheezing or bronchospasm, if no treatments needed after 48 hours then discontinue using Per Protocol order mode. If indication present, adjust the RT bronchodilator orders based on the Bronchodilator Assessment Score as indicated below. Use Inhaler orders unless patient has one or more of the following: on home nebulizer, not able to hold breath for 10 seconds, is not alert and oriented, cannot activate and use MDI correctly, or respiratory rate 25 breaths per minute or more, then use the equivalent nebulizer order(s) with same Frequency and PRN reasons based on the score. If a patient is on this medication at home then do not decrease Frequency below that used at home.     0-3 - enter or revise RT bronchodilator order(s) to equivalent RT Bronchodilator order with Frequency of every 4 hours PRN for wheezing or increased work of breathing using Per Protocol order mode. 4-6 - enter or revise RT Bronchodilator order(s) to two equivalent RT bronchodilator orders with one order with BID Frequency and one order with Frequency of every 4 hours PRN wheezing or increased work of breathing using Per Protocol order mode. 7-10 - enter or revise RT Bronchodilator order(s) to two equivalent RT bronchodilator orders with one order with TID Frequency and one order with Frequency of every 4 hours PRN wheezing or increased work of breathing using Per Protocol order mode. 11-13 - enter or revise RT Bronchodilator order(s) to one equivalent RT bronchodilator order with QID Frequency and an Albuterol order with Frequency of every 4 hours PRN wheezing or increased work of breathing using Per Protocol order mode. Greater than 13 - enter or revise RT Bronchodilator order(s) to one equivalent RT bronchodilator order with every 4 hours Frequency and an Albuterol order with Frequency of every 2 hours PRN wheezing or increased work of breathing using Per Protocol order mode. RT to enter RT Home Evaluation for COPD & MDI Assessment order using Per Protocol order mode.     Electronically signed by Lalo Randle RCP on 1/2/2023 at 8:25 PM

## 2023-01-03 NOTE — PLAN OF CARE
Problem: Respiratory - Adult  Goal: Achieves optimal ventilation and oxygenation  Outcome: Progressing   Continue treatments as ordered per protocol to help facilitate normal breath sounds. Continue to wean O2 per protocol as tolerated to  keep Spo2 above 92%.  Pt currently on 6 LPM NC with Spo2 90-91%, Pt agreeable to plan

## 2023-01-03 NOTE — PLAN OF CARE
Problem: Respiratory - Adult  Goal: Achieves optimal ventilation and oxygenation  1/2/2023 2246 by Maryann Velasquez RN  Outcome: Progressing  1/2/2023 2026 by Jose Alejandro Isabel RCP  Outcome: Progressing  Goal: Clear lung sounds  Outcome: Progressing     Problem: Neurosensory - Adult  Goal: Achieves stable or improved neurological status  Outcome: Progressing     Problem: Infection - Adult  Goal: Absence of infection during hospitalization  Outcome: Progressing     Problem: Cardiovascular - Adult  Goal: Maintains optimal cardiac output and hemodynamic stability  Outcome: Progressing     Problem: Skin/Tissue Integrity - Adult  Goal: Skin integrity remains intact  Outcome: Progressing     Problem: Musculoskeletal - Adult  Goal: Return mobility to safest level of function  Outcome: Progressing     Problem: Gastrointestinal - Adult  Goal: Minimal or absence of nausea and vomiting  Outcome: Progressing     Problem: Genitourinary - Adult  Goal: Absence of urinary retention  Outcome: Progressing     Problem: Discharge Planning  Goal: Discharge to home or other facility with appropriate resources  Outcome: Progressing     Problem: Skin/Tissue Integrity  Goal: Absence of new skin breakdown  Description: 1. Monitor for areas of redness and/or skin breakdown  2. Assess vascular access sites hourly  3. Every 4-6 hours minimum:  Change oxygen saturation probe site  4. Every 4-6 hours:  If on nasal continuous positive airway pressure, respiratory therapy assess nares and determine need for appliance change or resting period.   Outcome: Progressing     Problem: ABCDS Injury Assessment  Goal: Absence of physical injury  Outcome: Progressing     Problem: Safety - Adult  Goal: Free from fall injury  Outcome: Progressing     Problem: Pain  Goal: Verbalizes/displays adequate comfort level or baseline comfort level  Outcome: Progressing     Problem: Chronic Conditions and Co-morbidities  Goal: Patient's chronic conditions and co-morbidity symptoms are monitored and maintained or improved  Outcome: Progressing

## 2023-01-03 NOTE — PLAN OF CARE
Problem: Respiratory - Adult  Goal: Clear lung sounds  1/3/2023 0937 by Hilario Red RCP  Outcome: Progressing    Cont tx's to improve breath sounds, increase aeration and decrease WOB. Pt mutually agrees with goals.

## 2023-01-04 ENCOUNTER — APPOINTMENT (OUTPATIENT)
Dept: GENERAL RADIOLOGY | Age: 68
DRG: 871 | End: 2023-01-04
Payer: MEDICARE

## 2023-01-04 VITALS
OXYGEN SATURATION: 92 % | HEART RATE: 71 BPM | SYSTOLIC BLOOD PRESSURE: 113 MMHG | HEIGHT: 66 IN | WEIGHT: 220.5 LBS | RESPIRATION RATE: 20 BRPM | DIASTOLIC BLOOD PRESSURE: 58 MMHG | BODY MASS INDEX: 35.44 KG/M2 | TEMPERATURE: 97.4 F

## 2023-01-04 LAB
ANION GAP SERPL CALCULATED.3IONS-SCNC: 10 MEQ/L (ref 8–16)
BASOPHILS # BLD: 0.9 %
BASOPHILS ABSOLUTE: 0.1 THOU/MM3 (ref 0–0.1)
BUN BLDV-MCNC: 26 MG/DL (ref 7–22)
CALCIUM SERPL-MCNC: 8.8 MG/DL (ref 8.5–10.5)
CHLORIDE BLD-SCNC: 96 MEQ/L (ref 98–111)
CO2: 29 MEQ/L (ref 23–33)
CREAT SERPL-MCNC: 1 MG/DL (ref 0.4–1.2)
EOSINOPHIL # BLD: 1 %
EOSINOPHILS ABSOLUTE: 0.1 THOU/MM3 (ref 0–0.4)
ERYTHROCYTE [DISTWIDTH] IN BLOOD BY AUTOMATED COUNT: 14.6 % (ref 11.5–14.5)
ERYTHROCYTE [DISTWIDTH] IN BLOOD BY AUTOMATED COUNT: 46.2 FL (ref 35–45)
GFR SERPL CREATININE-BSD FRML MDRD: > 60 ML/MIN/1.73M2
GLUCOSE BLD-MCNC: 92 MG/DL (ref 70–108)
HCT VFR BLD CALC: 42 % (ref 37–47)
HEMOGLOBIN: 13.1 GM/DL (ref 12–16)
IMMATURE GRANS (ABS): 0.86 THOU/MM3 (ref 0–0.07)
IMMATURE GRANULOCYTES: 9.7 %
LYMPHOCYTES # BLD: 8.9 %
LYMPHOCYTES ABSOLUTE: 0.8 THOU/MM3 (ref 1–4.8)
MCH RBC QN AUTO: 27.1 PG (ref 26–33)
MCHC RBC AUTO-ENTMCNC: 31.2 GM/DL (ref 32.2–35.5)
MCV RBC AUTO: 86.8 FL (ref 81–99)
MONOCYTES # BLD: 12.3 %
MONOCYTES ABSOLUTE: 1.1 THOU/MM3 (ref 0.4–1.3)
NUCLEATED RED BLOOD CELLS: 0 /100 WBC
PLATELET # BLD: 351 THOU/MM3 (ref 130–400)
PMV BLD AUTO: 8.9 FL (ref 9.4–12.4)
POTASSIUM SERPL-SCNC: 4.8 MEQ/L (ref 3.5–5.2)
RBC # BLD: 4.84 MILL/MM3 (ref 4.2–5.4)
SEG NEUTROPHILS: 67.2 %
SEGMENTED NEUTROPHILS ABSOLUTE COUNT: 6 THOU/MM3 (ref 1.8–7.7)
SODIUM BLD-SCNC: 135 MEQ/L (ref 135–145)
T4 FREE: 0.96 NG/DL (ref 0.93–1.76)
TSH SERPL DL<=0.05 MIU/L-ACNC: 5.89 UIU/ML (ref 0.4–4.2)
WBC # BLD: 8.9 THOU/MM3 (ref 4.8–10.8)

## 2023-01-04 PROCEDURE — 80048 BASIC METABOLIC PNL TOTAL CA: CPT

## 2023-01-04 PROCEDURE — 84443 ASSAY THYROID STIM HORMONE: CPT

## 2023-01-04 PROCEDURE — 6370000000 HC RX 637 (ALT 250 FOR IP): Performed by: NURSE PRACTITIONER

## 2023-01-04 PROCEDURE — 97110 THERAPEUTIC EXERCISES: CPT

## 2023-01-04 PROCEDURE — 84439 ASSAY OF FREE THYROXINE: CPT

## 2023-01-04 PROCEDURE — 36415 COLL VENOUS BLD VENIPUNCTURE: CPT

## 2023-01-04 PROCEDURE — 97530 THERAPEUTIC ACTIVITIES: CPT

## 2023-01-04 PROCEDURE — 71045 X-RAY EXAM CHEST 1 VIEW: CPT

## 2023-01-04 PROCEDURE — 6360000002 HC RX W HCPCS: Performed by: PHARMACIST

## 2023-01-04 PROCEDURE — 94760 N-INVAS EAR/PLS OXIMETRY 1: CPT

## 2023-01-04 PROCEDURE — 94669 MECHANICAL CHEST WALL OSCILL: CPT

## 2023-01-04 PROCEDURE — 6370000000 HC RX 637 (ALT 250 FOR IP): Performed by: STUDENT IN AN ORGANIZED HEALTH CARE EDUCATION/TRAINING PROGRAM

## 2023-01-04 PROCEDURE — 97116 GAIT TRAINING THERAPY: CPT

## 2023-01-04 PROCEDURE — 6370000000 HC RX 637 (ALT 250 FOR IP): Performed by: INTERNAL MEDICINE

## 2023-01-04 PROCEDURE — 2580000003 HC RX 258: Performed by: STUDENT IN AN ORGANIZED HEALTH CARE EDUCATION/TRAINING PROGRAM

## 2023-01-04 PROCEDURE — 85025 COMPLETE CBC W/AUTO DIFF WBC: CPT

## 2023-01-04 PROCEDURE — 2700000000 HC OXYGEN THERAPY PER DAY

## 2023-01-04 PROCEDURE — 94640 AIRWAY INHALATION TREATMENT: CPT

## 2023-01-04 RX ORDER — QUETIAPINE FUMARATE 300 MG/1
600 TABLET, FILM COATED ORAL
Qty: 60 TABLET | Refills: 3 | Status: SHIPPED | OUTPATIENT
Start: 2023-01-04

## 2023-01-04 RX ORDER — PREDNISONE 20 MG/1
TABLET ORAL
Qty: 13 TABLET | Refills: 0 | Status: SHIPPED | OUTPATIENT
Start: 2023-01-05 | End: 2023-01-16

## 2023-01-04 RX ORDER — NICOTINE 21 MG/24HR
1 PATCH, TRANSDERMAL 24 HOURS TRANSDERMAL DAILY
Qty: 30 PATCH | Refills: 3 | Status: SHIPPED | OUTPATIENT
Start: 2023-01-05

## 2023-01-04 RX ORDER — GUAIFENESIN 600 MG/1
600 TABLET, EXTENDED RELEASE ORAL 2 TIMES DAILY
Qty: 60 TABLET | Refills: 0 | Status: SHIPPED | OUTPATIENT
Start: 2023-01-04 | End: 2023-02-03

## 2023-01-04 RX ADMIN — SODIUM CHLORIDE, PRESERVATIVE FREE 10 ML: 5 INJECTION INTRAVENOUS at 08:40

## 2023-01-04 RX ADMIN — ENOXAPARIN SODIUM 30 MG: 100 INJECTION SUBCUTANEOUS at 08:43

## 2023-01-04 RX ADMIN — METOPROLOL TARTRATE 12.5 MG: 25 TABLET, FILM COATED ORAL at 08:39

## 2023-01-04 RX ADMIN — IPRATROPIUM BROMIDE AND ALBUTEROL SULFATE 1 AMPULE: .5; 3 SOLUTION RESPIRATORY (INHALATION) at 08:38

## 2023-01-04 RX ADMIN — IPRATROPIUM BROMIDE AND ALBUTEROL SULFATE 1 AMPULE: .5; 3 SOLUTION RESPIRATORY (INHALATION) at 14:35

## 2023-01-04 RX ADMIN — GUAIFENESIN 600 MG: 600 TABLET, EXTENDED RELEASE ORAL at 08:39

## 2023-01-04 RX ADMIN — PREDNISONE 40 MG: 20 TABLET ORAL at 08:39

## 2023-01-04 RX ADMIN — PAROXETINE HYDROCHLORIDE 20 MG: 20 TABLET, FILM COATED ORAL at 08:40

## 2023-01-04 ASSESSMENT — PAIN SCALES - GENERAL
PAINLEVEL_OUTOF10: 0

## 2023-01-04 NOTE — PROGRESS NOTES
Went to go deliver medication's to patient's room and she discharged without her medications. Spoke with Chely Forrest Rn and she stated she just did not let the pt know she had meds down here with us. We tried to contact patient but number not in service. Nallely Zacarias,RIVERA-MANNYhT 1/4/2023,5:11 PM

## 2023-01-04 NOTE — FLOWSHEET NOTE
01/04/23 1010   Safe Environment   Safety Measures   (Virtual RN rounds complete)   PT sitting up in bedside chair, no needs identified at this time

## 2023-01-04 NOTE — DISCHARGE SUMMARY
Attending supervising physicians attestation statement:       I Discussed the findings and plans with the resident physician  personally   and agree with the IM resident'S note as outlined . Also spoke with the staff  Regarding care plans and recommendations. Pt was stable for discharge today . Electronically signed by Vandana Lyon MD on 2023 at [facility] Discharge Summary      Patient Identification:   Shannan Baez   : 1955  MRN: [id number]   Account: [de-identified]      Patient's PCP: BRIGETTE Joy CNP    Admit Date: 2022     Discharge Date: 2023      Admitting Physician: No admitting provider for patient encounter. Discharge Physician: Caryn Ramsay DO     Discharge Diagnoses:  Sepsis. POA. Resolved. Acute exacerbation of COPD. Improved. Acute on chronic hypoxic respiratory failure. Resolved with worsening, O2 requirement came down to baseline. CAP. Resolving. MELI on CPAP  ABRAN on CKD 3. Resolved. Multiple sclerosis. Primary hypertension. GERD. Hyperlipidemia. She is affective disorder with anxiety. Active tobacco smoker, with a history of 10+ years of smoking history. The patient was seen and examined on day of discharge and this discharge summary is in conjunction with any daily progress note from day of discharge. Hospital Course:   Shannan Baez is a 79 y.o. female admitted to [facility] on 2022 for SOB. Initial HPI: Per H&P - Shannan Baez is a 79 y.o. female with PMHx of hypertension, multiple cirrhosis, hypothyroidism, GERD, HLD, schizoaffective disorder, tobacco use disorder and CKD 3 who presented to [facility] with complaint of shortness of breath but was not entirely clear about her symptoms of concern. Patient is visibly disheveled appearing unbathed and not well kept.  Patient reports that she believes she may have the flu but denies vaccination. She reports using home oxygen at 3 L/min via nasal cannula and requiring additional oxygen support from her home pulse oximetry. She has associated symptoms of chills but was uncertain about fever. The remainder of the exam was inhibited by the patient's mental status as she was lethargic but easily rousable while intermittently falling asleep. Within the emergency department the patient was unable to maintain appropriate oxygen saturations and required escalating supplemental oxygen via nasal cannula which was subsequently escalated to high flow nasal cannula. She was administered 1 dose of ceftriaxone and doxycycline as well as 1 DuoNeb treatment. Inpatient hospital stay, patient seen and followed by pulmonary service. Received recommendations. Also seen by PT OT.,  And advised to continue PT OT as outpatient setting too. Over the course of the treatment, patient improved, sepsis resolved, ABRAN resolved. Patient's oxygen requirement decreased to baseline. Patient is stable, being discharged home, hospital arranged transportation. Patient will need PCP follow-up within 1 week of after hospital discharge. Also will need pulmonary follow-up within 2 weeks of after hospital discharge, patient prefers Dr. Sheyla Gonzalez as a pulmonologist.   Patient extensively advised and counseled regarding smoking cessation, medication compliance, healthy diet, physical activities, weight loss, and these will need PCP  follow-up as outpatient setting.     Exam:     Vitals:  Vitals:    01/04/23 0800 01/04/23 0838 01/04/23 1119 01/04/23 1302   BP: 118/66  (!) 113/58    Pulse: 84  71    Resp: 20  20    Temp: 98.2 °F (36.8 °C)  97.4 °F (36.3 °C)    TempSrc: Oral  Oral    SpO2: 90% 91% 91% 92%   Weight:       Height:         Weight: Weight: 220 lb 8 oz (100 kg)     24 hour intake/output:  Intake/Output Summary (Last 24 hours) at 1/4/2023 1715  Last data filed at 1/4/2023 1218  Gross per 24 hour   Intake 880 ml Output 1350 ml   Net -470 ml         General appearance:  No apparent distress, appears stated age and cooperative. Obese appearance. Chronically ill looking  HEENT:  Normal cephalic, atraumatic without obvious deformity. Pupils equal, round, and reactive to light. Extra ocular muscles intact. Conjunctivae/corneas clear. Neck: Supple, with full range of motion. No jugular venous distention. Trachea midline. Respiratory:  Normal respiratory effort. Diminished breath sounds bibasilar bilaterally with a scattered wheezing to auscultation, bilaterally without Rales/Wheezes/Rhonchi. Cardiovascular: Distant heart sounds. Regular rate and rhythm with normal S1/S2 without murmurs, rubs or gallops. Abdomen: Soft, non-tender, non-distended with normal bowel sounds. Musculoskeletal:  No clubbing, cyanosis or edema bilaterally. Full range of motion without deformity. Skin: Skin color, texture, turgor normal.  No rashes or lesions. Neurologic:  Neurovascularly intact without any focal sensory/motor deficits. Cranial nerves: II-XII intact, grossly non-focal.  Psychiatric:  Alert and oriented, thought content appropriate, normal insight  Capillary Refill: Brisk,< 3 seconds   Peripheral Pulses: +2 palpable, equal bilaterally       Labs:  For convenience and continuity at follow-up the following most recent labs are provided:      CBC:    Lab Results   Component Value Date/Time    WBC 8.9 01/04/2023 04:22 AM    HGB 13.1 01/04/2023 04:22 AM    HCT 42.0 01/04/2023 04:22 AM     01/04/2023 04:22 AM       Renal:    Lab Results   Component Value Date/Time     01/04/2023 04:22 AM    K 4.8 01/04/2023 04:22 AM    K 4.2 12/29/2022 04:27 AM    CL 96 01/04/2023 04:22 AM    CO2 29 01/04/2023 04:22 AM    BUN 26 01/04/2023 04:22 AM    CREATININE 1.0 01/04/2023 04:22 AM    CALCIUM 8.8 01/04/2023 04:22 AM    PHOS 3.5 12/27/2022 12:46 PM         Significant Diagnostic Studies    Radiology:   XR CHEST PORTABLE   Final Result 1. Normal heart size. No effusion. 2. Mild atelectasis/pneumonia both lung bases. 3. Overall appearance of chest slightly worse than prior. **This report has been created using voice recognition software. It may contain minor errors which are inherent in voice recognition technology. **      Final report electronically signed by Dr. Ebony Carranza on 1/4/2023 9:56 AM      XR CHEST (2 VW)   Final Result   Persistent, but improved infiltrate in the left lower lobe. **This report has been created using voice recognition software. It may contain minor errors which are inherent in voice recognition technology. **      Final report electronically signed by Dr. Felisa Aguirre on 1/2/2023 11:39 AM      CTA CHEST [address]   Final Result       1. No pulmonary embolism   2. Bilateral lower lobe airspace infiltrates   3. Scattered tree-in-bud opacities in the right upper lobe consistent with infectious or inflammatory etiology. **This report has been created using voice recognition software. It may contain minor errors which are inherent in voice recognition technology. **      Final report electronically signed by Dr. Mary Staples on 12/27/2022 2:52 PM      XR CHEST (2 VW)   Final Result   1. Normal heart size. 2. Mild left basilar atelectasis/pneumonia. No effusion is seen. 3. Questionable additional patch of mild infiltrate left midlung laterally. **This report has been created using voice recognition software. It may contain minor errors which are inherent in voice recognition technology. **      Final report electronically signed by Dr. Ebony Carranza on 12/27/2022 11:48 AM             Consults:     IP CONSULT TO SOCIAL WORK  PALLIATIVE CARE EVAL  IP CONSULT TO SOCIAL WORK  IP CONSULT TO PULMONOLOGY  IP CONSULT TO HOME CARE NEEDS    Disposition: Home  Condition at Discharge: Stable    Code Status:  Full Code     Patient Instructions:    Discharge lab work: Activity: activity as tolerated  Diet: ADULT DIET; Regular; Low Fat/Low Chol/High Fiber/KENNY      Follow-up visits:          Discharge Medications:        Medication List        START taking these medications      guaiFENesin 600 MG extended release tablet  Commonly known as: MUCINEX  Take 1 tablet by mouth 2 times daily     nicotine 21 MG/24HR  Commonly known as: NICODERM CQ  Place 1 patch onto the skin daily  Start taking on: January 5, 2023     predniSONE 20 MG tablet  Commonly known as: DELTASONE  Take 2 tablets by mouth daily for 2 days, THEN 1.5 tablets daily for 3 days, THEN 1 tablet daily for 3 days, THEN 0.5 tablets daily for 3 days.   Start taking on: January 5, 2023            CHANGE how you take these medications      QUEtiapine 300 MG tablet  Commonly known as: SEROQUEL  Take 2 tablets by mouth nightly  What changed: medication strength            CONTINUE taking these medications      Abilify Maintena 300 MG Prsy prefilled syringe  Generic drug: ARIPiprazole er     albuterol sulfate  (90 Base) MCG/ACT inhaler  Commonly known as: PROVENTIL;VENTOLIN;PROAIR     aspirin 81 MG EC tablet     atorvastatin 40 MG tablet  Commonly known as: LIPITOR     budesonide-formoterol 160-4.5 MCG/ACT Aero  Commonly known as: SYMBICORT     calcium-vitamin D 500-200 MG-UNIT per tablet  Commonly known as: OSCAL-500     dalfampridine 10 MG extended release tablet  Commonly known as: AMPYRA     metoprolol tartrate 25 MG tablet  Commonly known as: LOPRESSOR  Take 0.5 tablets by mouth 2 times daily     OXYGEN     pantoprazole 20 MG tablet  Commonly known as: PROTONIX  Take 1 tablet by mouth every morning (before breakfast)     PARoxetine 20 MG tablet  Commonly known as: PAXIL            STOP taking these medications      famotidine 20 MG tablet  Commonly known as: PEPCID     hydrOXYzine HCl 25 MG tablet  Commonly known as: ATARAX     levothyroxine 50 MCG tablet  Commonly known as: SYNTHROID     meloxicam 15 MG tablet  Commonly known as: MOBIC     traZODone 100 MG tablet  Commonly known as: DESYREL               Where to Get Your Medications        These medications were sent to [facility] [address], [address]  [address], [facility] [address]      Phone: [phone]   guaiFENesin 600 MG extended release tablet  nicotine 21 MG/24HR  predniSONE 20 MG tablet  QUEtiapine 300 MG tablet         Time Spent on discharge is more than 35 minutes  in the examination, evaluation, counseling and review of medications and discharge plan. Signed: Thank you BRIGETTE Guallpa CNP for the opportunity to be involved in this patient's care. Case discussed with Dr. Seble Hayward MD   Electronically signed by Rajendra Jimenez DO , PGY-2 Internal Medicine. , on 1/4/2023 at 5:15 PM

## 2023-01-04 NOTE — PROGRESS NOTES
[facility] ICU STEPDOWN TELEMETRY 4K  Occupational Therapy  Daily Note  Time:   Time In: 9046  Time Out: 2751  Timed Code Treatment Minutes: 25 Minutes  Minutes: 25          Date: 2023  Patient Name: Nicci Ghosh,   Gender: female      Room: [facility]/018-A  MRN: [id number]  : 1955  (79 y.o.)  Referring Practitioner: Marian Babb DO  Diagnosis: Hypoxia  Additional Pertinent Hx: Nicci Ghosh is a 79 y.o. female with PMHx of hypertension, multiple cirrhosis, hypothyroidism, GERD, HLD, schizoaffective disorder, tobacco use disorder and CKD 3 who presented to [facility] with complaint of shortness of breath but was not entirely clear about her symptoms of concern. Patient is visibly disheveled appearing unbathed and not well kept. Patient reports that she believes she may have the flu but denies vaccination. She reports using home oxygen at 3 L/min via nasal cannula and requiring additional oxygen support from her home pulse oximetry. She has associated symptoms of chills but was uncertain about fever. The remainder of the exam was inhibited by the patient's mental status as she was lethargic but easily rousable while intermittently falling asleep. Within the emergency department the patient was unable to maintain appropriate oxygen saturations and required escalating supplemental oxygen via nasal cannula which was subsequently escalated to high flow nasal cannula. She was administered 1 dose of ceftriaxone and doxycycline as well as 1 DuoNeb treatment. Restrictions/Precautions:  Restrictions/Precautions: General Precautions, Fall Risk  Position Activity Restriction  Other position/activity restrictions: 6 L NC     SUBJECTIVE: Pt seated upright in bed upon arrival, agreeable to OT session. PAIN: Denies. Vitals: Oxygen: Patient on 5 L O2 via Nasal Canula  upon arrival to room. Patient O2 sats at 91%. Upon activity patient maintaining O2 at >93%.  Pt requiring min rest break(s) to recover from fatigue. Heart Rate: WNL    COGNITION: Slow Processing, Decreased Recall, Decreased Insight, Impaired Memory, Decreased Problem Solving, and Decreased Safety Awareness    ADL:   No ADL's completed this session. Pt declined . BALANCE:  Sitting Balance:  Supervision. EOB  Standing Balance: Stand By Assistance. X1 minute in prep for mobility    BED MOBILITY:  Supine to Sit: Stand By Assistance, with verbal cues     Scooting: Supervision EOB    TRANSFERS:  Sit to Stand:  Stand By Assistance, cues for hand placement. From EOB  Stand to Sit: Stand By Assistance. To chair    FUNCTIONAL MOBILITY:  Assistive Device: Rolling Walker   Assist Level:  Stand By Assistance. Distance:   Completed functional mobility min household distance within unit hallway at fair pace, no LOB noted. Pt requires min cues for walker safety as she attempted to abandon RW returning to room- seated rest break after trial of mobility, min fatigue noted. ADDITIONAL ACTIVITIES:  Patient identified a personal goal to increase UB strength and improve overall endurance so they can complete their toilet & shower transfers; skilled edu on UE strengthening. Completed BUE exercises x10 reps x1 sets using min resistance band in all joints/planes to increase strength and endurance required for ADLs. Pt required min rest break between each exercise and min v/c for proper technique. ASSESSMENT:     Activity Tolerance:  Patient tolerance of  treatment: fair. Discharge Recommendations: Subacute/skilled nursing facility  Equipment Recommendations:  Other: monitor need for [facility], shower chair  Plan: Times Per Week: 5x  Current Treatment Recommendations: Strengthening, Balance training, Functional mobility training, Endurance training, Safety education & training, Patient/Caregiver education & training, Self-Care / ADL, Return to work related activity    Patient Education  Patient Education: Energy Conservation, Home Exercise Program, Importance of Increasing Activity, Assistive Device Safety, and Safety with transfers and mobility. Goals  Short Term Goals  Time Frame for Short Term Goals: by discharge  Short Term Goal 1: Pt will safely navigate to/from bathroom with LRAE and SBA for increased indep with ADLs  Short Term Goal 2: Pt will tolerate dynamic standing x5-6min with SBA and B hand release to increase indep with grooming  Short Term Goal 3: Pt will complete BADL routine with Min A and min VC for ECT to increase indep with self care  Short Term Goal 4: Pt will verbalize >/= 3 ECT for use during ADL routine to increase activity tolerance during self care routine    Following session, patient left in safe position with all fall risk precautions in place.

## 2023-01-04 NOTE — PROGRESS NOTES
[place] [facility]      Patient - Bettina Joy   MRN -  [id number]   [facility]   - 1955      Date of Admission -  2022 11:11 AM  Date of evaluation -  2023  Room - [facility] Livia Allen MD Primary Care Physician - BRIGETTE Cohen - CNP     Problem List      Active Hospital Problems    Diagnosis Date Noted    Pneumonia due to infectious organism [J18.9] 2023     Priority: Medium    Tobacco smoke exposure [Z77.22] 2023     Priority: Medium    COPD exacerbation ([facility] [address] [J44.1] 2022     Priority: Medium     Reason for Consult    Acute on chronic hypoxic respiratory failure  HPI   History Obtained From: Patient  and electronic medical record. Bettina Joy is a pleasant 79 y.o. female with PMHx HTN, MS, hypothyroid, GERD, HLD, schizoaffective disorder, tobacco use disorder, and CKD 3 who presented to [facility] ED with CC: Shortness of breath on 2022. Patient stated that she thought she had the flu. She says she had not been vaccinated for COVID or the flu. She stated she started feeling short of breath at home but did not have a pulse ox to take her O2 stats. She stated she was on 3 L O2. In ED she was satting at 89% on 3 L. She endorsed chills and possible fevers. She stated that she seems to be using more of her regular inhalers at home than normal.  Patient had no history of PE, was not using blood thinners, and not ambulating much over past few days before coming in. Patient denied chest pain but said she had been coughing up blood-tinged sputum. She denied any history of TB. Denied history of CHF. But states she has been using more pillows at night and endorsed some mild weight gain. Denied hormone use, cancer, abdominal pain, diarrhea or constipation. On presentation patient was visibly disheveled, appeared unbathed and not well kept.   Patient was lethargic but easily arousable when she intermittently fell asleep. Patient was unable to maintain appropriate oxygen saturation in ED and subsequently escalated to HFNC. She also received 1 dose of ceftriaxone and doxycycline in ED. By 12/31 patient had been weaned down to 3 L through most of the day. However, in the last 2 days she has needed increased levels of oxygen is currently on 6 L NC. Feels better today. Got medicine at home and dog and plenty of visitors. She is having shortness of breath: Yes  Onset: improving   Duration:5minutes. Diurnal variation:  None. Worse  Happens intermittently   block/s on level ground. She can climb steps: No  Can't walk on right leg- broken  Flights of steps she can climb: 1  She admits to orthopnea. She admits to paroxysmal nocturnal dyspnea. She is having cough: Yes  Duration of cough: for 7 days. Her cough is associated with sputum production: Yes   The sputum color: yellow  Hemoptysis:Yes  Diurnal variation: None. Worse in the morning  Relieving factors: No  Aggravating factors: No      She is having chest pain:No  Scale:denies/10  Relation ship to breathing: no pain with inspiration.       Past 24 hrs   -on 5 LPM via NC this AM  -still having expiratory wheeze but reports subjective improvement  -Ambulating in hallway with therapy this AM  All other systems reviewed    PMHx   Past Medical History      Diagnosis Date    Bipolar disorder ([address]     Cerebral artery occlusion with cerebral infarction ([address]     Chronic back pain     Colon polyps     COPD (chronic obstructive pulmonary disease) (HCC)     Depression     Dermatitis seborrheica     DJD (degenerative joint disease)     Heart problem     Hyperlipidemia     Multiple sclerosis ([address] dx 2003    Relapse/Remitting type    Need for other prophylactic chemotherapy(V07.39)     Obesity     Osteoarthritis     Back    Osteopenia     Panic disorder without agoraphobia     Personal history of fall     Schizoaffective disorder ([facility] [address]     Secondary parkinsonism (HCC)     Seizures (HCC)     Sinusitis     Tension headache, chronic     Weight loss       Past Surgical History        Procedure Laterality Date    APPENDECTOMY      CARDIOVASCULAR STRESS TEST  05/2011    COLONOSCOPY      7/2014,2022    EGD  2022    Dr Hamilton Patient (CERVIX STATUS UNKNOWN)      MAMMO IMPLANT DIGITAL DIAG BI  06/2011    repeat in 12/2011    SHOULDER SURGERY      LEONILA AND BSO (CERVIX REMOVED)       Meds    Current Medications    ipratropium-albuterol  1 ampule Inhalation TID    predniSONE  40 mg Oral Daily    nicotine  1 patch TransDERmal Daily    guaiFENesin  600 mg Oral BID    QUEtiapine  600 mg Oral QHS    PARoxetine  20 mg Oral QAM    metoprolol tartrate  12.5 mg Oral BID    sodium chloride flush  5-40 mL IntraVENous 2 times per day    enoxaparin  30 mg SubCUTAneous Q12H     sodium chloride flush, sodium chloride, ondansetron **OR** ondansetron, polyethylene glycol, acetaminophen **OR** acetaminophen, ipratropium-albuterol  IV Drips/Infusions   sodium chloride       Home Medications  Medications Prior to Admission: dalfampridine (AMPYRA) 10 MG extended release tablet, Take 10 mg by mouth in the morning and 10 mg in the evening.   ARIPiprazole er (ABILIFY MAINTENA) 300 MG PRSY prefilled syringe, Inject 300 mg into the muscle every 30 days  QUEtiapine (SEROQUEL) 400 MG tablet, Take 600 mg by mouth nightly  traZODone (DESYREL) 100 MG tablet, Take 100-200 mg by mouth nightly as needed for Sleep  pantoprazole (PROTONIX) 20 MG tablet, Take 1 tablet by mouth every morning (before breakfast)  budesonide-formoterol (SYMBICORT) 160-4.5 MCG/ACT AERO, Inhale 2 puffs into the lungs 2 times daily  PARoxetine (PAXIL) 20 MG tablet, Take 20 mg by mouth every morning  OXYGEN, Inhale 3 L into the lungs continuous  calcium-vitamin D (OSCAL-500) 500-200 MG-UNIT per tablet, Take 1 tablet by mouth 2 times daily  meloxicam (MOBIC) 15 MG tablet, Take 15 mg by mouth daily  hydrOXYzine (ATARAX) 25 MG tablet, Take 25 mg by mouth every 8 hours as needed for Anxiety  famotidine (PEPCID) 20 MG tablet, Take 20 mg by mouth 2 times daily  atorvastatin (LIPITOR) 40 MG tablet, Take 40 mg by mouth daily  aspirin 81 MG EC tablet, Take 81 mg by mouth daily  albuterol sulfate HFA (PROVENTIL;VENTOLIN;PROAIR) 108 (90 Base) MCG/ACT inhaler, Inhale 1 puff into the lungs every 6 hours as needed for Wheezing  [DISCONTINUED] Diroximel Fumarate 231 MG CPDR, Take 2 caplets by mouth in the morning and at bedtime  [DISCONTINUED] nystatin (MYCOSTATIN) 480720 UNIT/GM powder, Apply topically 3 times daily Apply topically 3 times daily. [DISCONTINUED] traZODone (DESYREL) 50 MG tablet, Take 1 tablet by mouth nightly as needed for Sleep (Patient taking differently: No sig reported)  [DISCONTINUED] QUEtiapine (SEROQUEL) 100 MG tablet, Take 5 tablets by mouth nightly (Patient taking differently: Take 200 mg by mouth 2 times daily)  metoprolol tartrate (LOPRESSOR) 25 MG tablet, Take 0.5 tablets by mouth 2 times daily  levothyroxine (SYNTHROID) 50 MCG tablet, Take 1 tablet by mouth Daily  Diet    ADULT DIET;  Regular; Low Fat/Low Chol/High Fiber/KENNY; Low Potassium (Less than 3000 mg/day)  Allergies    Adhesive tape  Social History     Social History     Socioeconomic History    Marital status:      Spouse name: Not on file    Number of children: 0    Years of education: 11    Highest education level: Not on file   Occupational History    Occupation: disability   Tobacco Use    Smoking status: Every Day     Packs/day: 0.50     Years: 20.00     Pack years: 10.00     Types: Cigarettes    Smokeless tobacco: Never   Vaping Use    Vaping Use: Never used   Substance and Sexual Activity    Alcohol use: Not Currently     Comment: occasionally    Drug use: Not Currently     Types: Marijuana Delona Members)     Comment: last use yesterday    Sexual activity: Never   Other Topics Concern    Not on file   Social History Narrative    Not on file     Social Determinants of Health     Financial Resource Strain: Not on file   Food Insecurity: Not on file   Transportation Needs: Not on file   Physical Activity: Not on file   Stress: Not on file   Social Connections: Not on file   Intimate Partner Violence: Not on file   Housing Stability: Not on file     Family History          Problem Relation Age of Onset    Other Mother         back    Diabetes Mother     Cancer Mother     Diabetes Father     Cancer Brother     Cancer Brother      Sleep History    Reportedly diagnosed with sleep apnea in the past at [facility] previously evaluated by Dr. Hao Marrero     height is 5' 6\" (1.676 m) and weight is 220 lb 8 oz (100 kg). Her oral temperature is 98.2 °F (36.8 °C). Her blood pressure is 118/65 and her pulse is 84. Her respiration is 20 and oxygen saturation is 91%. Body mass index is 35.59 kg/m². SUPPLEMENTAL O2: O2 Flow Rate (L/min): 5 L/min     I/O      Intake/Output Summary (Last 24 hours) at 1/4/2023 1005  Last data filed at 1/4/2023 0557  Gross per 24 hour   Intake 1040 ml   Output 1550 ml   Net -510 ml       I/O last 3 completed shifts: In: 1086.3 [P.O.:1040; IV Piggyback:46.3]  Out: 0613 [Urine:1550]   Patient Vitals for the past 96 hrs (Last 3 readings):   Weight   01/01/23 0312 220 lb 8 oz (100 kg)         Exam   Physical Exam   Constitutional: No distress on O2 per NC. Patient appears obese BMI 35  Head: Normocephalic and atraumatic. Mouth/Throat: Oropharynx is clear and moist.  No oral thrush. Eyes: Conjunctivae are normal. Pupils are equal, round. No scleral icterus. Neck: Neck supple. No tracheal deviation present. Cardiovascular: S1 and S2 with no murmur. No peripheral edema  Pulmonary/Chest: Normal effort with bilateral air entry, noted expiratory wheezing. No stridor. No respiratory distress. Patient exhibits no tenderness. Abdominal: Soft. Bowel sounds audible. No distension or tenderness to palp. Musculoskeletal: Moves all extremities  Neurological: Patient is alert and follows simple commands     Labs  - Old records and notes have been reviewed in [facility]   ABG  Lab Results   Component Value Date/Time    PH 7.40 08/31/2017 08:31 AM    PO2 75 08/31/2017 08:31 AM    PCO2 39 08/31/2017 08:31 AM    HCO3 24 08/31/2017 08:31 AM    O2SAT 95 08/31/2017 08:31 AM     Lab Results   Component Value Date/Time    IFIO2 2 08/31/2017 08:31 AM     CBC  Recent Labs     01/03/23  0834 01/04/23  0422   WBC 11.7* 8.9   RBC 5.17 4.84   HGB 13.9 13.1   HCT 43.9 42.0   MCV 84.9 86.8   MCH 26.9 27.1   MCHC 31.7* 31.2*    351   MPV 9.0* 8.9*        BMP  Recent Labs     01/03/23  0834 01/04/23 0422    135   K 5.5* 4.8   CL 95* 96*   CO2 27 29   BUN 21 26*   CREATININE 0.8 1.0   GLUCOSE 150* 92   CALCIUM 9.0 8.8       LFT  No results for input(s): AST, ALT, ALB, BILITOT, ALKPHOS, LIPASE in the last 72 hours. Invalid input(s): AMYLASE  TROP  Lab Results   Component Value Date/Time    TROPONINT < 0.010 12/27/2022 11:29 AM    TROPONINT < 0.010 10/10/2018 04:03 PM    TROPONINT < 0.010 10/06/2018 05:06 PM     BNP  No results for input(s): BNP in the last 72 hours. Lactic Acid  No results for input(s): LACTA in the last 72 hours. INR  No results for input(s): INR, PROTIME in the last 72 hours. PTT  No results for input(s): APTT in the last 72 hours. Glucose  No results for input(s): POCGLU in the last 72 hours. UA No results for input(s): SPECGRAV, PHUR, COLORU, CLARITYU, MUCUS, PROTEINU, BLOODU, RBCUA, WBCUA, BACTERIA, NITRU, GLUCOSEU, BILIRUBINUR, UROBILINOGEN, KETUA, LABCAST, LABCASTTY, AMORPHOS in the last 72 hours. Invalid input(s): CRYSTALS.     PFTs       Cultures    Bcx2 NGTD,   Aerobic NGTD,   PNA panel PCR no sample obtained  Urine + for Candida,  MRSA/VRE Neg,   Covid/Flu negative    EKG     Echocardiogram     TTE 1/3/2023    Conclusions      Summary   Technically difficult study due to poor acoustic windows.   Ejection fraction is visually estimated at 55%.   Overall left ventricular function is normal.      Signature      ----------------------------------------------------------------   Electronically signed by Arvind Mccord MD (Interpreting   physician) on 01/03/2023 at 08:06 PM   ----------------------------------------------------------------    Radiology    CXR    XR CHEST PORTABLE     1/4/2023     1. Normal heart size. No effusion.   2. Mild atelectasis/pneumonia both lung bases.   3. Overall appearance of chest slightly worse than prior.       XR CHEST (2 VW)     1/2/2023     FINDINGS:   The heart size is normal.  The mediastinum is not widened.  There are no pulmonary infiltrates or effusions.  There is persistent mild infiltrate in the left lower lobe. There are no pleural effusions. The right lung is relatively well aerated.   Impression:  Persistent, but improved infiltrate in the left lower lobe.     CT Scans  (See actual reports for details)    CTA CHEST W WO CONTRAST     12/27/2022     FINDINGS:  There is adequate opacification of the pulmonary arterial system. No pulmonary emboli are present.  The aorta is within acceptable limits.   The heart size is normal. There is no pericardial or pleural effusion.  Multiple nonenlarged mediastinal lymph nodes similar to the prior exam. Calcified subcarinal nodes.  Evaluation of the lungs is limited by motion and streak artifact. There are tree-in-bud opacities in the lateral right upper lobe. There are bilateral lower lobe airspace infiltrates. There is no associated effusion.  No suspicious osseous lesions are present.  There are no suspicious findings in the imaged aspects of the upper abdomen.   Impression:  1. No pulmonary embolism   2. Bilateral lower lobe airspace infiltrates   3. Scattered tree-in-bud opacities in the right upper lobe consistent with infectious or inflammatory etiology.         Assessment   -Acute on chronic respiratory failure  with hypoxia: Baseline 3LNC. 2/2 below COPD exacerbation and pneumonia. Possible component of CHF, patient reports 3-4 pillow orthopnea. No echo on file patient currently on 6 L.  -Moderate COPD:  in acute exacerbation. PFT in 2017 shows combination restrictive and obstructive defect  -CAP- -CT: Bilat LL airspace infiltrates, scattered tree-in-bud opacities RUL consistent with infectious or inflammatory etiology.  -Patient on Rocephin Day 6/7 and completed Zithromax 5/5.  -MELI on CPAP: at home  -Hx of tobacco abuse & current smoker: Patient has a 25-year smoking history. Less than 1 pack a day. Currently smokes 2 to 3 cigarettes a day. Plan   -Monitor SpO2 wean supplemental O2 to maintain SpO2 >90%  -ECHO with normal LVEF, noted elevated ProBNP   -PO prednisone 40 mg PO Daily will taper over 12 days decreasing 10 mg every 3 days  -Guaifenesin 600 mg PO BID for thick secretions   -CPT Vest therapy Q shift  -agree with ATB's coverage for CAP as primary is doing azithromycin  mg Daily 5/5 completed will complete 7 days Rocephin today  -Pt to bring in home CPAP. Until then will use hospital CPAP 12 mmH2O at night  -Orders placed to obtain sleep study records from Dr. Frankey Mormon at [facility] O2 eval prior to discharge, if O2 requirements in manageable range OK with discharge today  -Tobacco cessation discussed currently on NRT patches  -DVT prophylaxis with lovenox  -Will have patient follow-up with Dr. Frankey Mormon per her preference as she is following at [facility] for sleep apnea, needs Pulmonary follow-up in 2-4 weeks with Dr. Vibha Lugo     Questions and concerns addressed. Electronically signed by   BRIGETTE Crump CNP on 1/4/2023 at 10:05 AM     Addendum by Dr. Janey Rashid MD:  Patient seen by me independently including key components of medical care. Face to face evaluation and examination was performed.  Case discussed with Mr. Maame Amaya, CNP  Italicized font, if present, represents changes to the note made by me. More than 50% of the encounter time involved with patient care by the Pulmonary & Critical care service team spent by me . Please see my modifications mentioned below:  Feels better  Follow-up as above  I spoke with Dr. Princess Spangler. Xin [address], DO regarding this patient.     Electronically signed by   Myesha Carrillo MD on 1/4/2023 at 7:53 PM

## 2023-01-04 NOTE — PLAN OF CARE
Problem: Respiratory - Adult  Goal: Achieves optimal ventilation and oxygenation  1/3/2023 2306 by Ozzie Warren RN  Outcome: Progressing  Flowsheets (Taken 1/3/2023 1950)  Achieves optimal ventilation and oxygenation:   Assess for changes in respiratory status   Assess for changes in mentation and behavior   Position to facilitate oxygenation and minimize respiratory effort   Oxygen supplementation based on oxygen saturation or arterial blood gases   Assess and instruct to report shortness of breath or any respiratory difficulty   Respiratory therapy support as indicated  1/3/2023 1109 by Arnaldo Severs, RN  Outcome: Progressing  Flowsheets (Taken 1/3/2023 0800)  Achieves optimal ventilation and oxygenation:   Assess for changes in respiratory status   Assess for changes in mentation and behavior   Position to facilitate oxygenation and minimize respiratory effort   Oxygen supplementation based on oxygen saturation or arterial blood gases  Goal: Clear lung sounds  1/3/2023 2306 by Ozzie Warren RN  Outcome: Progressing  1/3/2023 1939 by Afua English RCP  Outcome: Progressing  1/3/2023 1109 by Arnaldo Severs, RN  Outcome: Progressing  1/3/2023 0950 by Lissett Trevino RCP  Outcome: Progressing  1/3/2023 0937 by Lissett Trevino RCP  Outcome: Progressing
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
## 2023-01-04 NOTE — PROGRESS NOTES
A home oxygen evaluation has been completed. [x]Patient is an inpatient. It is expected that the patient will be discharged within the next 48 hours. Qualified provider to write order for home prescription if patient qualifies. Social service/care managers will arrange for home oxygen. If patient is active, arrange for Home Medical supplier to assess for Oxygen Conserving Device per pulse oximetry. []Patient is an outpatient. Results will be faxed to the ordering provider. Qualified provider to write order for home prescription if patient qualifies and arranges for home oxygen. Patient was placed on room air for 1 minutes. SpO2 was 84 % on room air at rest. Patients SpO2 was below 89% and qualified for home oxygen. Placed pt on 3 lpm nc to get sats 90-92%-- actual sats  92%. Patient was walked for 6 minutes on 3 lpm  nc. Oxygen was applied at 3 lpm via nasal cannula to maintain a SpO2 between 90-92% while walking. Actual SpO2 was 90 %. Note: For any SpO2 at 17% see policy and procedure for possible qualifications.

## 2023-01-04 NOTE — CARE COORDINATION
1/4/23, 2:59 PM EST    Patient goals/plan/ treatment preferences discussed by  and . Patient goals/plan/ treatment preferences reviewed with patient/ family. Patient/ family verbalize understanding of discharge plan and are in agreement with goal/plan/treatment preferences. Understanding was demonstrated using the teach back method. AVS provided by RN at time of discharge, which includes all necessary medical information pertaining to the patients current course of illness, treatment, post-discharge goals of care, and treatment preferences. Services At/After Discharge: Home Health       IMM Letter  IMM Letter given to Patient/Family/Significant other/Guardian/POA/by[de-identified] GERHARD Bentley CM  IMM Letter date given[de-identified] 01/04/23  IMM Letter time given[de-identified] 1045      Spoke with patient's  Estee Laboy from [employer] and updated of discharge for today. Spoke with Berhane at [address] [facility] and updated of discharge and possible order for therapy to be added to services. Cab form given to RN has patient is waiting on a oxygen tank. Patient stated that her neighbor has her home unlock. She denied any further needs.

## 2023-01-04 NOTE — PLAN OF CARE
Problem: Respiratory - Adult  Goal: Clear lung sounds  1/3/2023 1939 by Keisha Elizabeth RCP  Outcome: Progressing   Patient continues on scheduled breathing treatments. Patient mutually agrees on goals.

## 2023-01-04 NOTE — CARE COORDINATION
CM note; home oxygen eval unchanged from prior; [employer] will call Jacquie Polo RN w ETA on oxygen tank as no family/friends available; Domonique Fabian will assist w [facility] [facility]  Electronically signed by Fabian Alcantar RN on 1/4/2023 at 2:37 PM

## 2023-01-04 NOTE — PROGRESS NOTES
Nik White stated she is ready to be going home. She is up in a chair on 4k. No family is present. Prayer is offered after our time of conversation. 01/04/23 1530   Encounter Summary   Encounter Overview/Reason  Initial Encounter   Service Provided For: Patient   Referral/Consult From: Bimal   Last Encounter  01/04/23   Complexity of Encounter Moderate   Encounter    Type Initial Screen/Assessment   Spiritual/Emotional needs   Type Spiritual Support   Assessment/Intervention/Outcome   Assessment Coping   Intervention Active listening;Prayer (assurance of)/[place];Nurtured Hope   Outcome Coping;Engaged in conversation;Encouraged   Care Plan:  Continue spiritual and emotional care for patient and family. Including prayers.

## 2023-09-01 NOTE — CARE COORDINATION
Case Management Assessment  Initial Evaluation    Date/Time of Evaluation: 12/28/2022 11:53 AM  Assessment Completed by: Ethel Yarbrough RN    If patient is discharged prior to next notation, then this note serves as note for discharge by case management. Patient Name: Ailyn Patel                   YOB: 1955  Diagnosis: Hypoxia [R09.02]  COPD exacerbation ([address] [J44.1]  Pneumonia due to infectious organism, unspecified laterality, unspecified part of lung [J18.9]                   Date / Time: 12/27/2022 11:11 AM  Location: [facility]18/018-A     Patient Admission Status: Inpatient   Readmission Risk (Low < 19, Mod (19-27), High > 27): Readmission Risk Score: 12.9    Current PCP: BRIGETTE Hoang CNP  PCP verified by ? Yes    Chart Reviewed: Yes      History Provided by: Patient, Medical Record  Patient Orientation: Alert and Oriented    Patient Cognition: Alert    Hospitalization in the last 30 days (Readmission):  No    If yes, Readmission Assessment in CM Navigator will be completed. Advance Directives:      Code Status: Full Code   Patient's Primary Decision Maker is: Named in Scanned ACP Document      Discharge Planning:    Patient lives with: Alone Type of Home: Apartment  Primary Care Giver: Self  Patient Support Systems include: Friends/Neighbors   Current Financial resources: Medicare  Current community resources: None  Current services prior to admission: Durable Medical Equipment            Current DME: Walker            Type of Home Care services:  Nursing Services    ADLS  Prior functional level: Independent in ADLs/IADLs  Current functional level: Independent in ADLs/IADLs    Family can provide assistance at DC: Yes  Would you like Case Management to discuss the discharge plan with any other family members/significant others, and if so, who?  Yes (friend Laquita Spangler)  Plans to Return to Present Housing: Yes  Other Identified Issues/Barriers to RETURNING to current housing: Norco as needed for severe pain   unknown  Potential Assistance needed at discharge:              Potential DME:    Patient expects to discharge to: Apartment  Plan for transportation at discharge: Friends    Financial    Payor: [employer] MEDICARE / Plan: [employer] CHOICE-PPO MEDICARE / Product Type: *No Product type* /     Does insurance require precert for SNF: Yes    Potential assistance Purchasing Medications: No  Meds-to-Beds request: Yes      [id number] - [place], [place] - [address] - P [phone] - F [phone]  [address] [place] [address]  Phone: [phone] Fax: [phone]    [facility] - [place], [place] - [address] - P [phone] - F [phone]  [address]  [place] [address]  Phone: [phone] Fax: [phone]    [facility] [address] - [place], [place] - [address] - P [phone] - F [phone]  [address] [place] [address]  Phone: [phone] Fax: [phone]      Notes:    Factors facilitating achievement of predicted outcomes: Friend support, Motivated, Cooperative, Pleasant, and Good insight into deficits    Barriers to discharge: No family support, Limited insight into deficits, Unrealistic expectations, Decreased endurance    Additional Case Management Notes:   COPD/PNA  History: Parkinson's, Bipolar, Schizohrenia, CVA, COPD, Smoker, Marijuana Use  HF Oxygen 50% FIO2/40L  Elevated WBC    The Plan for Transition of Care is related to the following treatment goals of Hypoxia [R09.02]  COPD exacerbation (HCC) [J44.1]  Pneumonia due to infectious organism, unspecified laterality, unspecified part of lung [J18.9]    Patient Goals/Plan/Treatment Preferences: lives alone (neighbor Anushka attentive to needs but cannot care for her); current [facility], aides x3 per week 2h each, but will not have nursing as of 1/31 and patient may need another agency, has Sourav Diaz CM @ [phone]; has [employer] home oxygen 2-3L, CPAP; refused SNF in past; client  unkept and neighbor reports patient does not bathe, history falls and is smoker; has cane, walker; therapy following  Transportation/Food Security/Housekeeping Addressed: No issues identified.      Elba Basurto RN  Case Management Department

## 2024-03-04 ENCOUNTER — HOSPITAL ENCOUNTER (OUTPATIENT)
Dept: CT IMAGING | Age: 69
Discharge: HOME | End: 2024-03-04
Attending: INTERNAL MEDICINE
Payer: MEDICARE

## 2024-03-04 DIAGNOSIS — F17.210 CIGARETTE SMOKER: ICD-10-CM

## 2024-03-04 DIAGNOSIS — Z12.2 SCREENING FOR MALIGNANT NEOPLASM OF RESPIRATORY ORGAN: ICD-10-CM

## 2024-03-04 PROCEDURE — 71271 CT THORAX LUNG CANCER SCR C-: CPT
